# Patient Record
Sex: FEMALE | Race: WHITE | Employment: FULL TIME | URBAN - METROPOLITAN AREA
[De-identification: names, ages, dates, MRNs, and addresses within clinical notes are randomized per-mention and may not be internally consistent; named-entity substitution may affect disease eponyms.]

---

## 2023-10-10 ENCOUNTER — TELEPHONE (OUTPATIENT)
Dept: INTERVENTIONAL RADIOLOGY/VASCULAR | Age: 38
End: 2023-10-10

## 2023-10-12 ENCOUNTER — HOSPITAL ENCOUNTER (OUTPATIENT)
Dept: INTERVENTIONAL RADIOLOGY/VASCULAR | Age: 38
Discharge: HOME | End: 2023-10-12
Payer: MEDICARE

## 2023-10-12 VITALS
DIASTOLIC BLOOD PRESSURE: 66 MMHG | RESPIRATION RATE: 20 BRPM | TEMPERATURE: 97.3 F | OXYGEN SATURATION: 95 % | HEART RATE: 92 BPM | SYSTOLIC BLOOD PRESSURE: 121 MMHG

## 2023-10-12 DIAGNOSIS — R18.8 OTHER ASCITES: ICD-10-CM

## 2023-10-12 LAB
APPEARANCE FLUID: NORMAL
BDY FLUID QUALITY: NORMAL
CELL COUNT FLUID TYPE: NORMAL
COLOR FLUID: YELLOW
INR PPP: 1.79 (ref 0.84–1.16)
LYMPHOCYTES NFR FLD: 44 %
MESOTHL CELL NFR FLD: 3 %
MONOCYTES NFR FLD: 42 %
NEUTROPHIL, FLUID: 11 %
NUC CELL # FLD: 130 /CUMM
PLATELET # BLD AUTO: 184 K/UL (ref 135–450)
PROTHROMBIN TIME: 20.7 SEC (ref 11.5–14.8)
RBC FLUID: <1000 /CUMM
TOTAL CELLS COUNTED FLD: 100

## 2023-10-12 PROCEDURE — 87205 SMEAR GRAM STAIN: CPT

## 2023-10-12 PROCEDURE — 36415 COLL VENOUS BLD VENIPUNCTURE: CPT

## 2023-10-12 PROCEDURE — 85610 PROTHROMBIN TIME: CPT

## 2023-10-12 PROCEDURE — 7100000010 HC PHASE II RECOVERY - FIRST 15 MIN

## 2023-10-12 PROCEDURE — 2500000003 HC RX 250 WO HCPCS: Performed by: INTERNAL MEDICINE

## 2023-10-12 PROCEDURE — 87070 CULTURE OTHR SPECIMN AEROBIC: CPT

## 2023-10-12 PROCEDURE — 89051 BODY FLUID CELL COUNT: CPT

## 2023-10-12 PROCEDURE — 85049 AUTOMATED PLATELET COUNT: CPT

## 2023-10-12 PROCEDURE — 87015 SPECIMEN INFECT AGNT CONCNTJ: CPT

## 2023-10-12 PROCEDURE — 49083 ABD PARACENTESIS W/IMAGING: CPT

## 2023-10-12 PROCEDURE — 7100000011 HC PHASE II RECOVERY - ADDTL 15 MIN

## 2023-10-12 PROCEDURE — C1729 CATH, DRAINAGE: HCPCS

## 2023-10-12 RX ORDER — LIDOCAINE HYDROCHLORIDE 10 MG/ML
5 INJECTION, SOLUTION EPIDURAL; INFILTRATION; INTRACAUDAL; PERINEURAL ONCE
Status: COMPLETED | OUTPATIENT
Start: 2023-10-12 | End: 2023-10-12

## 2023-10-12 RX ADMIN — LIDOCAINE HYDROCHLORIDE 5 ML: 10 INJECTION, SOLUTION EPIDURAL; INFILTRATION; INTRACAUDAL; PERINEURAL at 13:45

## 2023-10-12 NOTE — DISCHARGE INSTRUCTIONS
Paracentesis  10/12/2023  Resume diet, avoid straining, heavy lifting, or strenuous physical activity until next day. No lifting more than 10 pounds or strenuous activity for 24 hours  Keep bandage on site, clean, and dry; may remove tomorrow. If fluid seepage occurs from the puncture site, it is often helpful to apply pressure to the site or lie sideways on the side of the puncture (applies pressure as well). If seepage continues after 30 minutes, contact your prescribing provider, Dr. Neda Chacon  Follow up with ordering physician for when to restart special medications and/or with any problems. Follow up with Dr. Gideon Chacon with any questions, concerns, or results of any testing done. You may receive a phone call from a nurse or tech to check on you in the next couple of days. The interventional radiologist you saw today does not usually follow-up with you after your procedure. He/she does not change or prescribe medications or treatments. All questions after today should be answered by your prescribing physician, Dr. Frosty Litten may resume any vitamin E, fish oil, blood thinners or anticoagulants (Plavix, coumadin, aspirin, Eliquis, etc.) tomorrow unless otherwise instructed. The interventional radiologist you saw today is Dr. Katlyn Michaels, for your records.

## 2023-10-12 NOTE — PROGRESS NOTES
4000ml of fluid removed  Spoke to patients REJI Cifuentes and advised her the amount of fluid removed and that patient was returning to her room.

## 2023-10-12 NOTE — BRIEF OP NOTE
Brief Postoperative Note    Mohinder Steven  YOB: 1985  [id number]    Pre-operative Diagnosis: ascites    Post-operative Diagnosis: Same    Procedure: US-guided paracentesis    Anesthesia: Local    Surgeons: Errol Mclaughlin MD    Estimated Blood Loss: Less than 5 mL    Complications: None    Specimens: ascites drained    Findings: Successful US-guided paracentesis.     Electronically signed by Errol Mclaughlin MD on 10/12/2023 at 1:20 PM

## 2023-10-13 NOTE — PROGRESS NOTES
Call to [facility], at [facility], notified PT/INR 20.7/1.79 on 10/12/23.

## 2023-10-13 NOTE — PROGRESS NOTES
Jax Balderas, from [facility], returned call and stated Dr. Hyacinth Aguilera is aware of PT/INR and pt.is ok for EGD.

## 2023-10-13 NOTE — PROGRESS NOTES
Patient ___ reached   __X___not reached-preop instructions left on voice yyox____[phone]_________      DATE_10/18/23_______ TIME___1435_____ARRIVAL_1300  FEC________      Nothing to eat or drink after midnight the night before,except for what the prep instructions call for. If you do not have the instructions or do not understand them please contact your doctors office. Follow any instructions your doctors office has given you including what medications to take the AM of your procedure and which ones to hold. You may use your inhalers - bring rescue inhalers with you DOS. If you take a long acting insulin the atilio prior please cut the dose in half and take no diabetic medications that AM.Follow specific doctors office instructions regarding blood thinners and if they want you to hold and for how long. If you are on a blood thinner and have no instructions please contact the office and ask. Dress comfortably,bring your insurance card,picture ID,and a complete list of medications, including supplements. You must have a responsible adult to stay with you during the procedure,drive you home and stay with you. [facility] phone number [phone] for any questions. OTHER INSTRUCTIONS(if applicable)_________________________________________________________        VISITOR POLICY(subject to change)    Current visitor policy is 2 visitors per patient. No children allowed. Mask at discretion of facility. Visiting hours are 8a-8p. Overnight visitors will be at the discretion of the nurse. All policies are subject to change.

## 2023-10-15 LAB
BACTERIA FLD AEROBE CULT: NORMAL
GRAM STN SPEC: NORMAL

## 2023-10-18 ENCOUNTER — ANESTHESIA EVENT (OUTPATIENT)
Dept: ENDOSCOPY | Age: 38
End: 2023-10-18
Payer: COMMERCIAL

## 2023-10-18 ENCOUNTER — ANESTHESIA (OUTPATIENT)
Dept: ENDOSCOPY | Age: 38
End: 2023-10-18
Payer: COMMERCIAL

## 2023-10-18 ENCOUNTER — HOSPITAL ENCOUNTER (OUTPATIENT)
Age: 38
Setting detail: OUTPATIENT SURGERY
Discharge: HOME | End: 2023-10-18
Attending: INTERNAL MEDICINE | Admitting: INTERNAL MEDICINE
Payer: COMMERCIAL

## 2023-10-18 LAB — HCG UR QL: NEGATIVE

## 2023-10-18 PROCEDURE — 84703 CHORIONIC GONADOTROPIN ASSAY: CPT

## 2023-10-18 PROCEDURE — 2580000003 HC RX 258: Performed by: INTERNAL MEDICINE

## 2023-10-18 RX ORDER — FUROSEMIDE 20 MG/1
20 TABLET ORAL DAILY
COMMUNITY

## 2023-10-18 RX ORDER — DEXTROAMPHETAMINE SACCHARATE, AMPHETAMINE ASPARTATE MONOHYDRATE, DEXTROAMPHETAMINE SULFATE AND AMPHETAMINE SULFATE 5; 5; 5; 5 MG/1; MG/1; MG/1; MG/1
20 CAPSULE, EXTENDED RELEASE ORAL EVERY MORNING
COMMUNITY

## 2023-10-18 RX ORDER — SPIRONOLACTONE 50 MG/1
50 TABLET, FILM COATED ORAL DAILY
COMMUNITY

## 2023-10-18 RX ORDER — SODIUM CHLORIDE 9 MG/ML
INJECTION, SOLUTION INTRAVENOUS CONTINUOUS
Status: DISCONTINUED | OUTPATIENT
Start: 2023-10-18 | End: 2023-10-19 | Stop reason: HOSPADM

## 2023-10-18 RX ORDER — FOLIC ACID 1 MG/1
1 TABLET ORAL DAILY
COMMUNITY

## 2023-10-18 RX ORDER — DEXTROAMPHETAMINE SACCHARATE, AMPHETAMINE ASPARTATE MONOHYDRATE, DEXTROAMPHETAMINE SULFATE AND AMPHETAMINE SULFATE 3.75; 3.75; 3.75; 3.75 MG/1; MG/1; MG/1; MG/1
15 CAPSULE, EXTENDED RELEASE ORAL EVERY MORNING
COMMUNITY

## 2023-10-18 RX ORDER — TRAMADOL HYDROCHLORIDE 50 MG/1
50 TABLET ORAL EVERY 6 HOURS PRN
COMMUNITY

## 2023-10-18 RX ADMIN — SODIUM CHLORIDE: 9 INJECTION, SOLUTION INTRAVENOUS at 11:14

## 2023-10-18 ASSESSMENT — LIFESTYLE VARIABLES: SMOKING_STATUS: 0

## 2023-10-18 NOTE — ANESTHESIA PRE PROCEDURE
for: \"PHART\", \"PO2ART\", \"JDA2YGX\", \"MEH5GHJ\", \"BEART\", \"C5RSKAFL\"     Type & Screen (If Applicable):  No results found for: \"LABABO\", \"LABRH\"    Drug/Infectious Status (If Applicable):  No results found for: \"HIV\", \"HEPCAB\"    COVID-19 Screening (If Applicable): No results found for: \"COVID19\"        Anesthesia Evaluation  Patient summary reviewed and Nursing notes reviewed no history of anesthetic complications:   Airway: Mallampati: II  TM distance: >3 FB   Neck ROM: full  Mouth opening: > = 3 FB   Dental: normal exam         Pulmonary:Negative Pulmonary ROS and normal exam  breath sounds clear to auscultation      (-) COPD, asthma, sleep apnea and not a current smoker                           Cardiovascular:    (+) hypertension:,     (-) past MI, CAD, CABG/stent, dysrhythmias,  angina and  CHF      Rhythm: regular  Rate: normal                    Neuro/Psych:   (+) psychiatric history (ADHD, EtOH abuse hx):   (-) seizures, TIA and CVA           GI/Hepatic/Renal:   (+) GERD:, liver disease (EtOH cirrhosis): portal hypertension, coagulopathy from hepatic dysfunction, hepatic encephalopathy,      (-) no renal disease       Endo/Other: Negative Endo/Other ROS       (-) diabetes mellitus, hypothyroidism, hyperthyroidism               Abdominal:             Vascular: Other Findings:           Anesthesia Plan      MAC     ASA 3       Induction: intravenous. Anesthetic plan and risks discussed with patient. Plan discussed with CRNA.                     Lesly Haynes MD   10/18/2023

## 2023-10-18 NOTE — PROGRESS NOTES
Teaching / education initiated regarding perioperative experience, expectations, and pain management during stay. Patient verbalized understanding.     Electronically signed by Marco Huerta RN on 10/18/2023 at 11:13 AM

## 2023-10-31 ENCOUNTER — HOSPITAL ENCOUNTER (OUTPATIENT)
Dept: INTERVENTIONAL RADIOLOGY/VASCULAR | Age: 38
Discharge: HOME | End: 2023-10-31
Payer: COMMERCIAL

## 2023-10-31 VITALS
OXYGEN SATURATION: 99 % | RESPIRATION RATE: 15 BRPM | SYSTOLIC BLOOD PRESSURE: 103 MMHG | TEMPERATURE: 97.3 F | DIASTOLIC BLOOD PRESSURE: 54 MMHG | HEART RATE: 88 BPM

## 2023-10-31 DIAGNOSIS — R18.8 OTHER ASCITES: ICD-10-CM

## 2023-10-31 PROCEDURE — 2500000003 HC RX 250 WO HCPCS: Performed by: INTERNAL MEDICINE

## 2023-10-31 PROCEDURE — C1729 CATH, DRAINAGE: HCPCS

## 2023-10-31 PROCEDURE — 7100000010 HC PHASE II RECOVERY - FIRST 15 MIN

## 2023-10-31 PROCEDURE — 49083 ABD PARACENTESIS W/IMAGING: CPT

## 2023-10-31 RX ORDER — LIDOCAINE HYDROCHLORIDE 10 MG/ML
5 INJECTION, SOLUTION EPIDURAL; INFILTRATION; INTRACAUDAL; PERINEURAL ONCE
Status: COMPLETED | OUTPATIENT
Start: 2023-10-31 | End: 2023-10-31

## 2023-10-31 RX ADMIN — LIDOCAINE HYDROCHLORIDE 5 ML: 10 INJECTION, SOLUTION EPIDURAL; INFILTRATION; INTRACAUDAL; PERINEURAL at 14:08

## 2023-10-31 ASSESSMENT — PAIN - FUNCTIONAL ASSESSMENT: PAIN_FUNCTIONAL_ASSESSMENT: 0-10

## 2023-10-31 NOTE — PROGRESS NOTES
Pt received from radiology post paracentesis.  Pt had 5.2L removed pt declines wanting albumin, vss.

## 2023-11-08 NOTE — PROGRESS NOTES
Patient ___ reached   _X____not reached-preop instructions left on voice fjjj______[phone]_______      DATE_11/15/23_______ TIME____1400____ARRIVAL___1230 FEC______      Nothing to eat or drink after midnight the night before,except for what the prep instructions call for. If you do not have the instructions or do not understand them please contact your doctors office. Follow any instructions your doctors office has given you including what medications to take the AM of your procedure and which ones to hold. You may use your inhalers - bring rescue inhalers with you DOS. If you take a long acting insulin the atilio prior please cut the dose in half and take no diabetic medications that AM.Follow specific doctors office instructions regarding blood thinners and if they want you to hold and for how long. If you are on a blood thinner and have no instructions please contact the office and ask. Dress comfortably,bring your insurance card,picture ID,and a complete list of medications, including supplements. You must have a responsible adult to stay with you during the procedure,drive you home and stay with you. [facility] phone number [phone] for any questions. OTHER INSTRUCTIONS(if applicable)_________________________________________________________        VISITOR POLICY(subject to change)    Current visitor policy is 2 visitors per patient. No children allowed. Mask at discretion of facility. Visiting hours are 8a-8p. Overnight visitors will be at the discretion of the nurse. All policies are subject to change.

## 2023-11-14 ENCOUNTER — HOSPITAL ENCOUNTER (OUTPATIENT)
Dept: INTERVENTIONAL RADIOLOGY/VASCULAR | Age: 38
Discharge: HOME | End: 2023-11-14
Payer: COMMERCIAL

## 2023-11-14 VITALS
TEMPERATURE: 97 F | OXYGEN SATURATION: 99 % | DIASTOLIC BLOOD PRESSURE: 49 MMHG | RESPIRATION RATE: 16 BRPM | HEART RATE: 92 BPM | SYSTOLIC BLOOD PRESSURE: 106 MMHG

## 2023-11-14 DIAGNOSIS — R18.8 OTHER ASCITES: ICD-10-CM

## 2023-11-14 LAB
APPEARANCE FLUID: NORMAL
BDY FLUID QUALITY: NORMAL
CELL COUNT FLUID TYPE: NORMAL
COLOR FLUID: YELLOW
INR PPP: 1.98 (ref 0.84–1.16)
LYMPHOCYTES NFR FLD: 11 %
MACROPHAGES # FLD: 75 %
MESOTHL CELL NFR FLD: 8 %
MONONUCLEAR UNIDENTIFIED CELLS FLUID: 1 %
NEUTROPHIL, FLUID: 5 %
NUC CELL # FLD: 263 /CUMM
PATH REV: YES
PLATELET # BLD AUTO: 105 K/UL (ref 135–450)
PROTHROMBIN TIME: 22.4 SEC (ref 11.5–14.8)
RBC FLUID: 4900 /CUMM
TOTAL CELLS COUNTED FLD: 100

## 2023-11-14 PROCEDURE — 7100000011 HC PHASE II RECOVERY - ADDTL 15 MIN

## 2023-11-14 PROCEDURE — 36415 COLL VENOUS BLD VENIPUNCTURE: CPT

## 2023-11-14 PROCEDURE — C1729 CATH, DRAINAGE: HCPCS

## 2023-11-14 PROCEDURE — 88112 CYTOPATH CELL ENHANCE TECH: CPT

## 2023-11-14 PROCEDURE — 49083 ABD PARACENTESIS W/IMAGING: CPT

## 2023-11-14 PROCEDURE — 85610 PROTHROMBIN TIME: CPT

## 2023-11-14 PROCEDURE — 87070 CULTURE OTHR SPECIMN AEROBIC: CPT

## 2023-11-14 PROCEDURE — 2500000003 HC RX 250 WO HCPCS: Performed by: RADIOLOGY

## 2023-11-14 PROCEDURE — 87205 SMEAR GRAM STAIN: CPT

## 2023-11-14 PROCEDURE — 7100000010 HC PHASE II RECOVERY - FIRST 15 MIN

## 2023-11-14 PROCEDURE — 85049 AUTOMATED PLATELET COUNT: CPT

## 2023-11-14 PROCEDURE — 89051 BODY FLUID CELL COUNT: CPT

## 2023-11-14 PROCEDURE — 88305 TISSUE EXAM BY PATHOLOGIST: CPT

## 2023-11-14 RX ORDER — LIDOCAINE HYDROCHLORIDE 10 MG/ML
10 INJECTION, SOLUTION EPIDURAL; INFILTRATION; INTRACAUDAL; PERINEURAL ONCE
Status: COMPLETED | OUTPATIENT
Start: 2023-11-14 | End: 2023-11-14

## 2023-11-14 RX ADMIN — LIDOCAINE HYDROCHLORIDE 10 ML: 10 INJECTION, SOLUTION EPIDURAL; INFILTRATION; INTRACAUDAL; PERINEURAL at 13:33

## 2023-11-14 ASSESSMENT — PAIN - FUNCTIONAL ASSESSMENT: PAIN_FUNCTIONAL_ASSESSMENT: NONE - DENIES PAIN

## 2023-11-14 NOTE — PROGRESS NOTES
4700ml of fluid removed  Spoke to patients REJI schaefer and advised her the amount of fluid removed and that patient was returning to the floor.

## 2023-11-14 NOTE — BRIEF OP NOTE
Brief Postoperative Note    Salena Lee  YOB: 1985  [id number]    Pre-operative Diagnosis: ascites    Post-operative Diagnosis: Same    Procedure: US-guided paracentesis    Anesthesia: Local    Surgeons: Austyn Caro MD    Estimated Blood Loss: Less than 5 mL    Complications: None    Specimens: ascites drained    Findings: Successful US-guided paracentesis.     Electronically signed by Austyn Caro MD on 11/14/2023 at 1:23 PM

## 2023-11-14 NOTE — DISCHARGE INSTRUCTIONS
Resume diet, rest quietly for 24 hours, avoid straining , heavy lifting, or strenuous physical activity until next day. Do not restart aspirin or Vitamin E until next day. Keep bandaid on site clean and dry, may remove tomorrow. Follow up with ordering physician when to restart anticoagulants and/or with any problems. Paracentesis  11/14/2023  Resume diet, avoid straining, heavy lifting, or strenuous physical activity until next day. No lifting more than 10 pounds or strenuous activity for 24 hours  Keep bandage on site, clean, and dry; may remove tomorrow. If fluid seepage occurs from the puncture site, it is often helpful to apply pressure to the site or lie sideways on the side of the puncture (applies pressure as well). If seepage continues after 30 minutes, contact your prescribing provider, Dr. Meenu Rios  .  Follow up with ordering physician for when to restart special medications and/or with any problems. Follow up with Dr. Meenu Rios with any questions, concerns, or results of any testing done. You may receive a phone call from a nurse or tech to check on you in the next couple of days. The interventional radiologist you saw today does not usually follow-up with you after your procedure. He/she does not change or prescribe medications or treatments. All questions after today should be answered by your prescribing physician, Dr. Noreen Olmos may resume any vitamin E, fish oil, blood thinners or anticoagulants (Plavix, coumadin, aspirin, Eliquis, etc.) tomorrow unless otherwise instructed. The interventional radiologist you saw today is Dr. Claros March, for your records. Today you had a paracentesis. We drained 4.7mL off your abdomen.

## 2023-11-15 ENCOUNTER — ANESTHESIA (OUTPATIENT)
Dept: ENDOSCOPY | Age: 38
End: 2023-11-15
Payer: COMMERCIAL

## 2023-11-15 ENCOUNTER — ANESTHESIA EVENT (OUTPATIENT)
Dept: ENDOSCOPY | Age: 38
End: 2023-11-15
Payer: COMMERCIAL

## 2023-11-15 ENCOUNTER — HOSPITAL ENCOUNTER (OUTPATIENT)
Age: 38
Setting detail: OUTPATIENT SURGERY
Discharge: HOME | End: 2023-11-15
Attending: INTERNAL MEDICINE | Admitting: INTERNAL MEDICINE
Payer: COMMERCIAL

## 2023-11-15 VITALS
OXYGEN SATURATION: 98 % | TEMPERATURE: 97.6 F | HEART RATE: 79 BPM | BODY MASS INDEX: 29.72 KG/M2 | SYSTOLIC BLOOD PRESSURE: 102 MMHG | HEIGHT: 66 IN | RESPIRATION RATE: 18 BRPM | DIASTOLIC BLOOD PRESSURE: 65 MMHG | WEIGHT: 184.9 LBS

## 2023-11-15 DIAGNOSIS — K74.60 HEPATIC CIRRHOSIS, UNSPECIFIED HEPATIC CIRRHOSIS TYPE, UNSPECIFIED WHETHER ASCITES PRESENT (HCC): ICD-10-CM

## 2023-11-15 LAB
HCG UR QL: NEGATIVE
PATH CONSULT FLUID: NORMAL

## 2023-11-15 PROCEDURE — 7100000010 HC PHASE II RECOVERY - FIRST 15 MIN: Performed by: INTERNAL MEDICINE

## 2023-11-15 PROCEDURE — 2580000003 HC RX 258: Performed by: INTERNAL MEDICINE

## 2023-11-15 PROCEDURE — 3700000000 HC ANESTHESIA ATTENDED CARE: Performed by: INTERNAL MEDICINE

## 2023-11-15 PROCEDURE — 84703 CHORIONIC GONADOTROPIN ASSAY: CPT

## 2023-11-15 PROCEDURE — 7100000011 HC PHASE II RECOVERY - ADDTL 15 MIN: Performed by: INTERNAL MEDICINE

## 2023-11-15 PROCEDURE — 2709999900 HC NON-CHARGEABLE SUPPLY: Performed by: INTERNAL MEDICINE

## 2023-11-15 PROCEDURE — 3609012400 HC EGD TRANSORAL BIOPSY SINGLE/MULTIPLE: Performed by: INTERNAL MEDICINE

## 2023-11-15 PROCEDURE — 2500000003 HC RX 250 WO HCPCS: Performed by: REGISTERED NURSE

## 2023-11-15 PROCEDURE — 2580000003 HC RX 258: Performed by: REGISTERED NURSE

## 2023-11-15 PROCEDURE — 6360000002 HC RX W HCPCS: Performed by: REGISTERED NURSE

## 2023-11-15 RX ORDER — CARVEDILOL 6.25 MG/1
12.5 TABLET ORAL 2 TIMES DAILY
Qty: 180 TABLET | Refills: 5 | Status: SHIPPED | OUTPATIENT
Start: 2023-11-15

## 2023-11-15 RX ORDER — PROPOFOL 10 MG/ML
INJECTION, EMULSION INTRAVENOUS PRN
Status: DISCONTINUED | OUTPATIENT
Start: 2023-11-15 | End: 2023-11-15 | Stop reason: SDUPTHER

## 2023-11-15 RX ORDER — SODIUM CHLORIDE 9 MG/ML
INJECTION, SOLUTION INTRAVENOUS CONTINUOUS
Status: DISCONTINUED | OUTPATIENT
Start: 2023-11-15 | End: 2023-11-15 | Stop reason: HOSPADM

## 2023-11-15 RX ORDER — SODIUM CHLORIDE 9 MG/ML
INJECTION, SOLUTION INTRAVENOUS CONTINUOUS PRN
Status: DISCONTINUED | OUTPATIENT
Start: 2023-11-15 | End: 2023-11-15 | Stop reason: SDUPTHER

## 2023-11-15 RX ORDER — LIDOCAINE HYDROCHLORIDE 20 MG/ML
INJECTION, SOLUTION EPIDURAL; INFILTRATION; INTRACAUDAL; PERINEURAL PRN
Status: DISCONTINUED | OUTPATIENT
Start: 2023-11-15 | End: 2023-11-15 | Stop reason: SDUPTHER

## 2023-11-15 RX ORDER — OMEPRAZOLE 20 MG/1
20 CAPSULE, DELAYED RELEASE ORAL
Qty: 90 CAPSULE | Refills: 1 | Status: SHIPPED | OUTPATIENT
Start: 2023-11-15

## 2023-11-15 RX ADMIN — PROPOFOL 120 MCG/KG/MIN: 10 INJECTION, EMULSION INTRAVENOUS at 13:59

## 2023-11-15 RX ADMIN — PROPOFOL 100 MG: 10 INJECTION, EMULSION INTRAVENOUS at 14:00

## 2023-11-15 RX ADMIN — SODIUM CHLORIDE: 9 INJECTION, SOLUTION INTRAVENOUS at 13:56

## 2023-11-15 RX ADMIN — LIDOCAINE HYDROCHLORIDE 100 MG: 20 INJECTION, SOLUTION EPIDURAL; INFILTRATION; INTRACAUDAL; PERINEURAL at 14:00

## 2023-11-15 RX ADMIN — SODIUM CHLORIDE: 9 INJECTION, SOLUTION INTRAVENOUS at 13:06

## 2023-11-15 ASSESSMENT — PAIN DESCRIPTION - FREQUENCY: FREQUENCY: INTERMITTENT

## 2023-11-15 ASSESSMENT — LIFESTYLE VARIABLES: SMOKING_STATUS: 0

## 2023-11-15 ASSESSMENT — PAIN SCALES - GENERAL: PAINLEVEL_OUTOF10: 2

## 2023-11-15 ASSESSMENT — PAIN DESCRIPTION - LOCATION: LOCATION: ABDOMEN

## 2023-11-15 NOTE — DISCHARGE INSTRUCTIONS
EGD DISCHARGE INSTRUCTIONS    Use salt water gargle, lozenges, or Chloraseptic spray as needed for sore throat. If you have fever, chills, excessive bleeding, severe chest pain, or abdominal pain, or any other problems, contact your physician's office immediately at [phone]. If you had an esophageal dilatation, and experience fever, chills, excessive bleeding, shortness of breath, chest or abdominal pain, or any other unusual symptom, call the office immediately at [phone]. Continue home medications as directed. Call physician's office in five to seven business days for biopsy results or further instructions. Call your physician at [phone] for a follow up appointment in ______________    You need another EGD in _______________________________    See your physician's report for procedure details and recommendations. ANESTHESIA DISCHARGE INSTRUCTIONS    Wear your seatbelt home. A responsible adult needs to be with you for 24 hours  You are under the influence of drugs-do not drink alcohol, drive ,operate machinery,or make any important decisions or sign any legal documentsfor 24 hours. You may resume normal activities tomorrow. You may experience lightheadedness,dizziness,or sleepiness following surgery. Rest at home today - increase activity as tolerated. It is recommended to take a four hour nap after procedure. Progress slowly to a regular diet unless your physician has instructed you otherwise. Avoid spicy and greasy food on first meal.  Drink plenty of water. If nausea becomes a problem call your physician. Call your doctor if concerns arise.

## 2023-11-15 NOTE — PROGRESS NOTES
Teaching / education initiated regarding perioperative experience, expectations, and pain management during stay. Patient verbalized understanding. Coag profile from 11/14/2023 reported to Dr Dena Diaz. OK to proceed with procedure.

## 2023-11-15 NOTE — ANESTHESIA POSTPROCEDURE EVALUATION
Department of Anesthesiology  Postprocedure Note    Patient: Mary Oakes  MRN: [id number]  YOB: 1985  Date of evaluation: 11/15/2023      Procedure Summary       Date: 11/15/23 Room / Location: [address] [place] [address]    Anesthesia Start: 8056 Anesthesia Stop: 1628    Procedure: EGD BIOPSY (Abdomen) Diagnosis:       Hepatic cirrhosis, unspecified hepatic cirrhosis type, unspecified whether ascites present ([address])      (Hepatic cirrhosis, unspecified hepatic cirrhosis type, unspecified whether ascites present ([address]) [K74.60])    Surgeons: Enrique Michel MD Responsible Provider: Mark Kurtz MD    Anesthesia Type: MAC ASA Status: 3            Anesthesia Type: No value filed.     Muna Phase I: Muna Score: 10    Muna Phase II: Muna Score: 10      Anesthesia Post Evaluation    Patient location during evaluation: bedside  Patient participation: complete - patient participated  Level of consciousness: awake and alert  Pain score: 2  Airway patency: patent  Nausea & Vomiting: no vomiting  Complications: no  Cardiovascular status: hemodynamically stable  Respiratory status: nonlabored ventilation  Hydration status: stable  Multimodal analgesia pain management approach  Pain management: adequate

## 2023-11-15 NOTE — BRIEF OP NOTE
Brief Postoperative Note      Patient: Luci Sinclair  YOB: 1985  MRN: [id number]    Date of Procedure: 11/15/2023    Pre-Op Diagnosis Codes:     * Hepatic cirrhosis, unspecified hepatic cirrhosis type, unspecified whether ascites present ([address]) [K74.60]       Procedure(s):  EGD BIOPSY    Surgeon(s):  Negrito Chino MD    Anesthesia: Monitor Anesthesia Care    Estimated Blood Loss (mL): Minimal    Complications: None    Specimens:   ID Type Source Tests Collected by Time Destination   A : Gastric Bx r/o H.pylori Tissue Stomach SURGICAL PATHOLOGY Lasha Morales RN 11/15/2023 1403        Findings:   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 Not amenable to esophageal banding. Mild portal hypertensive gastropathy. Patchy antral gastritis, biopsied. Plans:  Omeprazole 20 mg daily. Carvedilol 12.5 mg BID. Recall EGD in 1 year for EV surveillance.         Electronically signed by Negrito Chino MD on 11/15/2023 at 2:11 PM

## 2023-11-17 LAB
BACTERIA FLD AEROBE CULT: NORMAL
GRAM STN SPEC: NORMAL

## 2023-12-05 ENCOUNTER — HOSPITAL ENCOUNTER (OUTPATIENT)
Dept: INTERVENTIONAL RADIOLOGY/VASCULAR | Age: 38
Discharge: HOME | End: 2023-12-05
Payer: COMMERCIAL

## 2023-12-05 VITALS
TEMPERATURE: 98 F | DIASTOLIC BLOOD PRESSURE: 51 MMHG | RESPIRATION RATE: 16 BRPM | OXYGEN SATURATION: 100 % | SYSTOLIC BLOOD PRESSURE: 96 MMHG | HEART RATE: 72 BPM

## 2023-12-05 DIAGNOSIS — R18.8 OTHER ASCITES: ICD-10-CM

## 2023-12-05 PROCEDURE — P9047 ALBUMIN (HUMAN), 25%, 50ML: HCPCS

## 2023-12-05 PROCEDURE — C1729 CATH, DRAINAGE: HCPCS

## 2023-12-05 PROCEDURE — 49083 ABD PARACENTESIS W/IMAGING: CPT

## 2023-12-05 PROCEDURE — 2500000003 HC RX 250 WO HCPCS: Performed by: INTERNAL MEDICINE

## 2023-12-05 PROCEDURE — 7100000011 HC PHASE II RECOVERY - ADDTL 15 MIN

## 2023-12-05 PROCEDURE — 7100000010 HC PHASE II RECOVERY - FIRST 15 MIN

## 2023-12-05 PROCEDURE — 6360000002 HC RX W HCPCS

## 2023-12-05 RX ORDER — LIDOCAINE HYDROCHLORIDE 10 MG/ML
5 INJECTION, SOLUTION EPIDURAL; INFILTRATION; INTRACAUDAL; PERINEURAL ONCE
Status: COMPLETED | OUTPATIENT
Start: 2023-12-05 | End: 2023-12-05

## 2023-12-05 RX ORDER — ALBUMIN (HUMAN) 12.5 G/50ML
37.5 SOLUTION INTRAVENOUS ONCE
Status: COMPLETED | OUTPATIENT
Start: 2023-12-05 | End: 2023-12-05

## 2023-12-05 RX ORDER — ALBUMIN (HUMAN) 12.5 G/50ML
SOLUTION INTRAVENOUS
Status: COMPLETED
Start: 2023-12-05 | End: 2023-12-05

## 2023-12-05 RX ADMIN — ALBUMIN (HUMAN) 37.5 G: 0.25 INJECTION, SOLUTION INTRAVENOUS at 13:29

## 2023-12-05 RX ADMIN — ALBUMIN (HUMAN) 37.5 G: 12.5 SOLUTION INTRAVENOUS at 13:29

## 2023-12-05 RX ADMIN — LIDOCAINE HYDROCHLORIDE 5 ML: 10 INJECTION, SOLUTION EPIDURAL; INFILTRATION; INTRACAUDAL; PERINEURAL at 12:59

## 2023-12-05 NOTE — DISCHARGE INSTRUCTIONS
Paracentesis  12/5/2023  Resume diet, avoid straining, heavy lifting, or strenuous physical activity until next day. No lifting more than 10 pounds or strenuous activity for 24 hours  Keep bandage on site, clean, and dry; may remove tomorrow. If fluid seepage occurs from the puncture site, it is often helpful to apply pressure to the site or lie sideways on the side of the puncture (applies pressure as well). If seepage continues after 30 minutes, contact your prescribing provider. .  Follow up with ordering physician for when to restart special medications and/or with any problems. Follow up with with any questions, concerns, or results of any testing done. You may receive a phone call from a nurse or tech to check on you in the next couple of days. The interventional radiologist you saw today does not usually follow-up with you after your procedure. He/she does not change or prescribe medications or treatments. All questions after today should be answered by your prescribing physician,  You may resume any vitamin E, fish oil, blood thinners or anticoagulants (Plavix, coumadin, aspirin, Eliquis, etc.) tomorrow unless otherwise instructed. The interventional radiologist you saw today is Dr. Hilaria Buerger, for your records. Today you had a paracentesis. We drained 6mL off your abdomen. Then we gave you 37.5g albumin to prevent a fluid shift in your body.

## 2023-12-05 NOTE — PROGRESS NOTES
Pt returned to unit from IR 6L removed new orders. Pt denies any pain VSS site to R ABD site noted scant drainage to dressing.  Call light inreach

## 2023-12-05 NOTE — BRIEF OP NOTE
Brief Postoperative Note    Deann Brito  YOB: 1985  [id number]    Pre-operative Diagnosis: ascites    Post-operative Diagnosis: Same    Procedure: US-guided paracentesis    Anesthesia: Local    Surgeons: Jaclyn Suarez MD    Estimated Blood Loss: Less than 5 mL    Complications: None    Specimens: ascites drained    Findings: Successful US-guided paracentesis.     Electronically signed by Jaclyn Suarez MD on 12/5/2023 at 12:52 PM

## 2024-01-05 ENCOUNTER — HOSPITAL ENCOUNTER (OUTPATIENT)
Dept: INTERVENTIONAL RADIOLOGY/VASCULAR | Age: 39
Discharge: HOME | End: 2024-01-05
Payer: COMMERCIAL

## 2024-01-05 VITALS
RESPIRATION RATE: 16 BRPM | DIASTOLIC BLOOD PRESSURE: 56 MMHG | OXYGEN SATURATION: 95 % | TEMPERATURE: 97 F | HEART RATE: 85 BPM | SYSTOLIC BLOOD PRESSURE: 96 MMHG

## 2024-01-05 DIAGNOSIS — R18.8 OTHER ASCITES: ICD-10-CM

## 2024-01-05 LAB
APPEARANCE FLUID: CLEAR
BASOPHILS NFR FLD MANUAL: 0 %
BDY FLUID QUALITY: NORMAL
CELL COUNT FLUID TYPE: NORMAL
COLOR FLUID: YELLOW
LYMPHOCYTES NFR FLD: 29 %
MACROPHAGES # FLD: 38 %
MESOTHL CELL NFR FLD: 21 %
MONOCYTES NFR FLD: 1 %
MONONUCLEAR UNIDENTIFIED CELLS FLUID: 2 %
NEUTROPHIL, FLUID: 9 %
NUC CELL # FLD: 189 /CUMM
PATH REV: YES
RBC FLUID: 1100 /CUMM
TOTAL CELLS COUNTED FLD: 100

## 2024-01-05 PROCEDURE — 87205 SMEAR GRAM STAIN: CPT

## 2024-01-05 PROCEDURE — 87070 CULTURE OTHR SPECIMN AEROBIC: CPT

## 2024-01-05 PROCEDURE — P9047 ALBUMIN (HUMAN), 25%, 50ML: HCPCS

## 2024-01-05 PROCEDURE — C1729 CATH, DRAINAGE: HCPCS

## 2024-01-05 PROCEDURE — 87015 SPECIMEN INFECT AGNT CONCNTJ: CPT

## 2024-01-05 PROCEDURE — 89051 BODY FLUID CELL COUNT: CPT

## 2024-01-05 PROCEDURE — 6360000002 HC RX W HCPCS

## 2024-01-05 PROCEDURE — 0W9G3ZX DRAINAGE OF PERITONEAL CAVITY, PERCUTANEOUS APPROACH, DIAGNOSTIC: ICD-10-PCS | Performed by: RADIOLOGY

## 2024-01-05 PROCEDURE — 2500000003 HC RX 250 WO HCPCS: Performed by: RADIOLOGY

## 2024-01-05 PROCEDURE — 49083 ABD PARACENTESIS W/IMAGING: CPT

## 2024-01-05 RX ORDER — LIDOCAINE HYDROCHLORIDE 10 MG/ML
5 INJECTION, SOLUTION EPIDURAL; INFILTRATION; INTRACAUDAL; PERINEURAL ONCE
Status: COMPLETED | OUTPATIENT
Start: 2024-01-05 | End: 2024-01-05

## 2024-01-05 RX ORDER — ALBUMIN (HUMAN) 12.5 G/50ML
SOLUTION INTRAVENOUS
Status: COMPLETED
Start: 2024-01-05 | End: 2024-01-05

## 2024-01-05 RX ORDER — ALBUMIN (HUMAN) 12.5 G/50ML
50 SOLUTION INTRAVENOUS ONCE
Status: COMPLETED | OUTPATIENT
Start: 2024-01-05 | End: 2024-01-05

## 2024-01-05 RX ADMIN — LIDOCAINE HYDROCHLORIDE 5 ML: 10 INJECTION, SOLUTION EPIDURAL; INFILTRATION; INTRACAUDAL; PERINEURAL at 13:22

## 2024-01-05 RX ADMIN — ALBUMIN (HUMAN) 50 G: 12.5 SOLUTION INTRAVENOUS at 13:34

## 2024-01-05 RX ADMIN — ALBUMIN HUMAN 50 G: 0.25 SOLUTION INTRAVENOUS at 13:34

## 2024-01-05 ASSESSMENT — PAIN - FUNCTIONAL ASSESSMENT
PAIN_FUNCTIONAL_ASSESSMENT: NONE - DENIES PAIN
PAIN_FUNCTIONAL_ASSESSMENT: NONE - DENIES PAIN

## 2024-01-05 NOTE — PROGRESS NOTES
7500ml of fluid removed  Spoke to patients REJI Cifuentes and advised her the amount of fluid removed and that patient was returning to her room.

## 2024-01-05 NOTE — DISCHARGE INSTRUCTIONS
Paracentesis  1/5/2024  Resume diet, avoid straining, heavy lifting, or strenuous physical activity until next day.   No lifting more than 10 pounds or strenuous activity for 24 hours  Keep bandage on site, clean, and dry; may remove tomorrow.    If fluid seepage occurs from the puncture site, it is often helpful to apply pressure to the site or lie sideways on the side of the puncture (applies pressure as well).  If seepage continues after 30 minutes, contact your prescribing provider.  Follow up with ordering physician for when to restart special medications and/or with any problems.     Follow up with your physician with any questions, concerns, or results of any testing done.    You may receive a phone call from a nurse or tech to check on you in the next couple of days.  The interventional radiologist you saw today does not usually follow-up with you after your procedure.  He/she does not change or prescribe medications or treatments.  All questions after today should be answered by your prescribing physician.    You may resume any vitamin E, fish oil, blood thinners or anticoagulants (Plavix, coumadin, aspirin, Eliquis, etc.) tomorrow unless otherwise instructed.    The interventional radiologist you saw today is Dr. Biswas, for your records.      Today you had a paracentesis.  We drained ***mL off your abdomen.  Then we gave you ***g albumin to prevent a fluid shift in your body.

## 2024-01-05 NOTE — FLOWSHEET NOTE
01/05/24 1440   AVS Reviewed   AVS & discharge instructions reviewed with patient and/or representative? Yes   Reviewed instructions with Patient   Level of Understanding Questions answered;Teach back completed;Verbalized understanding;Return demonstration     Patient discharged home with all belongings

## 2024-01-05 NOTE — PROGRESS NOTES
Pt on unit from IR report received at bedside. Pt denies any pain VSS site to R ABD CDI call light in reach. New order for albumin given. IR removed 7.5ml

## 2024-01-05 NOTE — BRIEF OP NOTE
Brief Postoperative Note    Angélica Gonzalez  YOB: 1985  [id number]    Pre-operative Diagnosis: ascites    Post-operative Diagnosis: Same    Procedure: US-guided paracentesis    Anesthesia: Local    Surgeons: Kermit Espino MD    Estimated Blood Loss: Less than 5 mL    Complications: None    Specimens: ascites drained    Findings: Successful US-guided paracentesis.    Electronically signed by Kermit Espino MD on 1/5/2024 at 1:08 PM

## 2024-01-06 ENCOUNTER — HOSPITAL ENCOUNTER (INPATIENT)
Age: 39
LOS: 7 days | Discharge: HOME | DRG: 433 | End: 2024-01-13
Attending: FAMILY MEDICINE | Admitting: FAMILY MEDICINE
Payer: COMMERCIAL

## 2024-01-06 DIAGNOSIS — E87.1 HYPONATREMIA: Primary | ICD-10-CM

## 2024-01-06 DIAGNOSIS — R17 CHOLESTATIC JAUNDICE: ICD-10-CM

## 2024-01-06 PROBLEM — R42 DIZZINESS: Status: ACTIVE | Noted: 2024-01-06

## 2024-01-06 LAB
ABO + RH BLD: NORMAL
ALBUMIN SERPL-MCNC: 2.9 G/DL (ref 3.4–5)
ALP SERPL-CCNC: 173 U/L (ref 40–129)
ALT SERPL-CCNC: 23 U/L (ref 10–40)
ANION GAP SERPL CALCULATED.3IONS-SCNC: 10 MMOL/L (ref 3–16)
AST SERPL-CCNC: 60 U/L (ref 15–37)
BASOPHILS # BLD: 0 K/UL (ref 0–0.2)
BASOPHILS NFR BLD: 0.3 %
BILIRUB DIRECT SERPL-MCNC: 6.5 MG/DL (ref 0–0.3)
BILIRUB INDIRECT SERPL-MCNC: 7.7 MG/DL (ref 0–1)
BILIRUB SERPL-MCNC: 14.2 MG/DL (ref 0–1)
BLD GP AB SCN SERPL QL: NORMAL
BLOOD BANK DISPENSE STATUS: NORMAL
BLOOD BANK PRODUCT CODE: NORMAL
BPU ID: NORMAL
BUN SERPL-MCNC: 12 MG/DL (ref 7–20)
CALCIUM SERPL-MCNC: 8.3 MG/DL (ref 8.3–10.6)
CHLORIDE SERPL-SCNC: 91 MMOL/L (ref 99–110)
CO2 SERPL-SCNC: 22 MMOL/L (ref 21–32)
CREAT SERPL-MCNC: <0.5 MG/DL (ref 0.6–1.1)
DEPRECATED RDW RBC AUTO: 15.4 % (ref 12.4–15.4)
DESCRIPTION BLOOD BANK: NORMAL
EOSINOPHIL # BLD: 0.2 K/UL (ref 0–0.6)
EOSINOPHIL NFR BLD: 2.9 %
GFR SERPLBLD CREATININE-BSD FMLA CKD-EPI: >60 ML/MIN/{1.73_M2}
GLUCOSE SERPL-MCNC: 107 MG/DL (ref 70–99)
HCT VFR BLD AUTO: 18.4 % (ref 36–48)
HCT VFR BLD AUTO: 24.3 % (ref 36–48)
HGB BLD-MCNC: 6.8 G/DL (ref 12–16)
HGB BLD-MCNC: 8.5 G/DL (ref 12–16)
INR PPP: 2.75 (ref 0.84–1.16)
LYMPHOCYTES # BLD: 1 K/UL (ref 1–5.1)
LYMPHOCYTES NFR BLD: 12.1 %
MCH RBC QN AUTO: 40.6 PG (ref 26–34)
MCHC RBC AUTO-ENTMCNC: 37 G/DL (ref 31–36)
MCV RBC AUTO: 109.6 FL (ref 80–100)
MONOCYTES # BLD: 0.9 K/UL (ref 0–1.3)
MONOCYTES NFR BLD: 10.7 %
NEUTROPHILS # BLD: 6.1 K/UL (ref 1.7–7.7)
NEUTROPHILS NFR BLD: 74 %
OSMOLALITY SERPL: 262 MOSM/KG (ref 275–295)
OSMOLALITY UR: 678 MOSM/KG (ref 390–1070)
PLATELET # BLD AUTO: 75 K/UL (ref 135–450)
PMV BLD AUTO: 6.4 FL (ref 5–10.5)
POTASSIUM SERPL-SCNC: 4.9 MMOL/L (ref 3.5–5.1)
POTASSIUM UR-SCNC: 72.6 MMOL/L
PROT SERPL-MCNC: 5.2 G/DL (ref 6.4–8.2)
PROTHROMBIN TIME: 28.9 SEC (ref 11.5–14.8)
RBC # BLD AUTO: 1.68 M/UL (ref 4–5.2)
SODIUM SERPL-SCNC: 119 MMOL/L (ref 136–145)
SODIUM SERPL-SCNC: 122 MMOL/L (ref 136–145)
SODIUM SERPL-SCNC: 123 MMOL/L (ref 136–145)
SODIUM UR-SCNC: 42 MMOL/L
TSH SERPL DL<=0.005 MIU/L-ACNC: 2.64 UIU/ML (ref 0.27–4.2)
URATE SERPL-MCNC: 3.6 MG/DL (ref 2.6–6)
WBC # BLD AUTO: 8.3 K/UL (ref 4–11)

## 2024-01-06 PROCEDURE — P9016 RBC LEUKOCYTES REDUCED: HCPCS

## 2024-01-06 PROCEDURE — 36430 TRANSFUSION BLD/BLD COMPNT: CPT

## 2024-01-06 PROCEDURE — 84295 ASSAY OF SERUM SODIUM: CPT

## 2024-01-06 PROCEDURE — 85610 PROTHROMBIN TIME: CPT

## 2024-01-06 PROCEDURE — 1200000000 HC SEMI PRIVATE

## 2024-01-06 PROCEDURE — 85018 HEMOGLOBIN: CPT

## 2024-01-06 PROCEDURE — 80076 HEPATIC FUNCTION PANEL: CPT

## 2024-01-06 PROCEDURE — 86901 BLOOD TYPING SEROLOGIC RH(D): CPT

## 2024-01-06 PROCEDURE — 84550 ASSAY OF BLOOD/URIC ACID: CPT

## 2024-01-06 PROCEDURE — 84133 ASSAY OF URINE POTASSIUM: CPT

## 2024-01-06 PROCEDURE — 83935 ASSAY OF URINE OSMOLALITY: CPT

## 2024-01-06 PROCEDURE — 85014 HEMATOCRIT: CPT

## 2024-01-06 PROCEDURE — 84300 ASSAY OF URINE SODIUM: CPT

## 2024-01-06 PROCEDURE — 85025 COMPLETE CBC W/AUTO DIFF WBC: CPT

## 2024-01-06 PROCEDURE — 84443 ASSAY THYROID STIM HORMONE: CPT

## 2024-01-06 PROCEDURE — 6370000000 HC RX 637 (ALT 250 FOR IP): Performed by: NURSE PRACTITIONER

## 2024-01-06 PROCEDURE — 6370000000 HC RX 637 (ALT 250 FOR IP): Performed by: INTERNAL MEDICINE

## 2024-01-06 PROCEDURE — 83930 ASSAY OF BLOOD OSMOLALITY: CPT

## 2024-01-06 PROCEDURE — 36415 COLL VENOUS BLD VENIPUNCTURE: CPT

## 2024-01-06 PROCEDURE — 86900 BLOOD TYPING SEROLOGIC ABO: CPT

## 2024-01-06 PROCEDURE — 6360000002 HC RX W HCPCS: Performed by: NURSE PRACTITIONER

## 2024-01-06 PROCEDURE — P9047 ALBUMIN (HUMAN), 25%, 50ML: HCPCS | Performed by: NURSE PRACTITIONER

## 2024-01-06 PROCEDURE — 86923 COMPATIBILITY TEST ELECTRIC: CPT

## 2024-01-06 PROCEDURE — 30233N1 TRANSFUSION OF NONAUTOLOGOUS RED BLOOD CELLS INTO PERIPHERAL VEIN, PERCUTANEOUS APPROACH: ICD-10-PCS | Performed by: RADIOLOGY

## 2024-01-06 PROCEDURE — 80048 BASIC METABOLIC PNL TOTAL CA: CPT

## 2024-01-06 PROCEDURE — 2580000003 HC RX 258: Performed by: NURSE PRACTITIONER

## 2024-01-06 PROCEDURE — 86850 RBC ANTIBODY SCREEN: CPT

## 2024-01-06 RX ORDER — ONDANSETRON 2 MG/ML
4 INJECTION INTRAMUSCULAR; INTRAVENOUS EVERY 6 HOURS PRN
Status: DISCONTINUED | OUTPATIENT
Start: 2024-01-06 | End: 2024-01-13 | Stop reason: HOSPADM

## 2024-01-06 RX ORDER — ACETAMINOPHEN 650 MG/1
650 SUPPOSITORY RECTAL EVERY 6 HOURS PRN
Status: DISCONTINUED | OUTPATIENT
Start: 2024-01-06 | End: 2024-01-13 | Stop reason: HOSPADM

## 2024-01-06 RX ORDER — PANTOPRAZOLE SODIUM 40 MG/1
40 TABLET, DELAYED RELEASE ORAL
Status: DISCONTINUED | OUTPATIENT
Start: 2024-01-06 | End: 2024-01-13 | Stop reason: HOSPADM

## 2024-01-06 RX ORDER — ACETAMINOPHEN 325 MG/1
650 TABLET ORAL EVERY 6 HOURS PRN
Status: DISCONTINUED | OUTPATIENT
Start: 2024-01-06 | End: 2024-01-13 | Stop reason: HOSPADM

## 2024-01-06 RX ORDER — POTASSIUM CHLORIDE 7.45 MG/ML
10 INJECTION INTRAVENOUS PRN
Status: DISCONTINUED | OUTPATIENT
Start: 2024-01-06 | End: 2024-01-13 | Stop reason: HOSPADM

## 2024-01-06 RX ORDER — POLYETHYLENE GLYCOL 3350 17 G/17G
17 POWDER, FOR SOLUTION ORAL DAILY PRN
Status: DISCONTINUED | OUTPATIENT
Start: 2024-01-06 | End: 2024-01-13 | Stop reason: HOSPADM

## 2024-01-06 RX ORDER — DEXTROAMPHETAMINE SACCHARATE, AMPHETAMINE ASPARTATE MONOHYDRATE, DEXTROAMPHETAMINE SULFATE AND AMPHETAMINE SULFATE 2.5; 2.5; 2.5; 2.5 MG/1; MG/1; MG/1; MG/1
20 CAPSULE, EXTENDED RELEASE ORAL EVERY MORNING
Status: DISCONTINUED | OUTPATIENT
Start: 2024-01-06 | End: 2024-01-13 | Stop reason: HOSPADM

## 2024-01-06 RX ORDER — FOLIC ACID 1 MG/1
1 TABLET ORAL DAILY
Status: DISCONTINUED | OUTPATIENT
Start: 2024-01-06 | End: 2024-01-13 | Stop reason: HOSPADM

## 2024-01-06 RX ORDER — MAGNESIUM SULFATE IN WATER 40 MG/ML
2000 INJECTION, SOLUTION INTRAVENOUS PRN
Status: DISCONTINUED | OUTPATIENT
Start: 2024-01-06 | End: 2024-01-13 | Stop reason: HOSPADM

## 2024-01-06 RX ORDER — ONDANSETRON 4 MG/1
4 TABLET, ORALLY DISINTEGRATING ORAL EVERY 8 HOURS PRN
Status: DISCONTINUED | OUTPATIENT
Start: 2024-01-06 | End: 2024-01-13 | Stop reason: HOSPADM

## 2024-01-06 RX ORDER — SODIUM CHLORIDE 9 MG/ML
INJECTION, SOLUTION INTRAVENOUS PRN
Status: DISCONTINUED | OUTPATIENT
Start: 2024-01-06 | End: 2024-01-13 | Stop reason: HOSPADM

## 2024-01-06 RX ORDER — SODIUM CHLORIDE 9 MG/ML
INJECTION, SOLUTION INTRAVENOUS PRN
Status: DISCONTINUED | OUTPATIENT
Start: 2024-01-06 | End: 2024-01-11

## 2024-01-06 RX ORDER — TRAMADOL HYDROCHLORIDE 50 MG/1
50 TABLET ORAL EVERY 6 HOURS PRN
Status: DISCONTINUED | OUTPATIENT
Start: 2024-01-06 | End: 2024-01-13 | Stop reason: HOSPADM

## 2024-01-06 RX ORDER — ALBUMIN (HUMAN) 12.5 G/50ML
50 SOLUTION INTRAVENOUS ONCE
Status: COMPLETED | OUTPATIENT
Start: 2024-01-06 | End: 2024-01-06

## 2024-01-06 RX ORDER — SODIUM CHLORIDE 0.9 % (FLUSH) 0.9 %
5-40 SYRINGE (ML) INJECTION PRN
Status: DISCONTINUED | OUTPATIENT
Start: 2024-01-06 | End: 2024-01-13 | Stop reason: HOSPADM

## 2024-01-06 RX ORDER — DEXTROAMPHETAMINE SACCHARATE, AMPHETAMINE ASPARTATE, DEXTROAMPHETAMINE SULFATE AND AMPHETAMINE SULFATE 3.75; 3.75; 3.75; 3.75 MG/1; MG/1; MG/1; MG/1
15 TABLET ORAL DAILY
Status: ON HOLD | COMMUNITY
Start: 2023-12-11

## 2024-01-06 RX ORDER — SODIUM CHLORIDE 0.9 % (FLUSH) 0.9 %
5-40 SYRINGE (ML) INJECTION EVERY 12 HOURS SCHEDULED
Status: DISCONTINUED | OUTPATIENT
Start: 2024-01-06 | End: 2024-01-13 | Stop reason: HOSPADM

## 2024-01-06 RX ORDER — POTASSIUM CHLORIDE 20 MEQ/1
40 TABLET, EXTENDED RELEASE ORAL PRN
Status: DISCONTINUED | OUTPATIENT
Start: 2024-01-06 | End: 2024-01-13 | Stop reason: HOSPADM

## 2024-01-06 RX ADMIN — Medication 10 ML: at 19:53

## 2024-01-06 RX ADMIN — FOLIC ACID 1 MG: 1 TABLET ORAL at 11:45

## 2024-01-06 RX ADMIN — PANTOPRAZOLE SODIUM 40 MG: 40 TABLET, DELAYED RELEASE ORAL at 05:10

## 2024-01-06 RX ADMIN — ALBUMIN (HUMAN) 50 G: 0.25 INJECTION, SOLUTION INTRAVENOUS at 05:22

## 2024-01-06 RX ADMIN — Medication 10 ML: at 11:45

## 2024-01-06 RX ADMIN — Medication 15 G: at 23:40

## 2024-01-06 ASSESSMENT — PAIN SCALES - GENERAL
PAINLEVEL_OUTOF10: 0
PAINLEVEL_OUTOF10: 0

## 2024-01-06 NOTE — CONSENT
Informed Consent for Blood Component Transfusion Note    I have discussed with the patient the rationale for blood component transfusion; its benefits in treating or preventing fatigue, organ damage, or death; and its risk which includes mild transfusion reactions, rare risk of blood borne infection, or more serious but rare reactions. I have discussed the alternatives to transfusion, including the risk and consequences of not receiving transfusion. The patient had an opportunity to ask questions and had agreed to proceed with transfusion of blood components.    Electronically signed by MOO Branch NP on 1/6/24 at 5:12 AM EST

## 2024-01-06 NOTE — BLOOD REFUSAL
Gastroenterology Consult Note                          Patient: Angélica Gonzalez  : 1985  CSN#:      Date:  2024    Subjective:       History of Present Illness  Patient is a 38 y.o. female admitted with Dizziness [R42]  Hyponatremia [E87.1] who is seen in consult for cirrhosis.  She was recently diagnosed with alcoholic related cirrhosis.  Recently seen by Dr. Burns.  He did an EGD that showed grade 1 and grade 2 varices.  He started her on carvedilol.  She has a history of drinking 6 shots of liquor a day.  She stopped in September and has not had any alcohol since.  Her cirrhosis is further decompensated by the presence of large volume ascites.  She has had multiple paracenteses.  Her most recent was yesterday on 2024.  She had 7.5 L of ascites removed.  Fluid studies are negative for SBP.  She also been on furosemide and spironolactone.  She felt fine initially after her paracentesis yesterday and then felt weak and lightheaded.  So she was sent to the ER.  There she was found to be hyponatremic sodium 120.  Her bilirubin is also elevated more than her most recent baseline.  Last baseline was bilirubin of 7.  She denies any fevers GI bleeding or other complaints.      Past Medical History:   Diagnosis Date    ADHD     Cirrhosis (HCC)     ETOH abuse     Hepatic cirrhosis (HCC)       Past Surgical History:   Procedure Laterality Date    PARACENTESIS  10/14/2023    UPPER GASTROINTESTINAL ENDOSCOPY N/A 11/15/2023    EGD BIOPSY performed by Jone Burns MD at [facility]       Admission Meds  No current facility-administered medications on file prior to encounter.     Current Outpatient Medications on File Prior to Encounter   Medication Sig Dispense Refill    amphetamine-dextroamphetamine (ADDERALL) 15 MG tablet Take 1 tablet by mouth daily.  at 5pm      levonorgestrel (MIRENA) IUD 52 mg 1 each by IntraUTERine route once      omeprazole (PRILOSEC) 20 MG delayed release capsule

## 2024-01-06 NOTE — PROGRESS NOTES
4 Eyes Skin Assessment     NAME:  Angélica Gonzalez  YOB: 1985  MEDICAL RECORD NUMBER:  [id number]    The patient is being assessed for  Admission    I agree that at least one RN has performed a thorough Head to Toe Skin Assessment on the patient. ALL assessment sites listed below have been assessed.      Areas assessed by both nurses:    Head, Face, Ears, Shoulders, Back, Chest, Arms, Elbows, Hands, Sacrum. Buttock, Coccyx, Ischium, Legs. Feet and Heels, and Under Medical Devices         Does the Patient have a Wound? No noted wound(s)       Randy Prevention initiated by RN: Yes  Wound Care Orders initiated by RN: No    Pressure Injury (Stage 3,4, Unstageable, DTI, NWPT, and Complex wounds) if present, place Wound referral order by RN under : No    New Ostomies, if present place, Ostomy referral order under : No     Nurse 1 eSignature: Electronically signed by Marina Nava RN on 1/6/24 at 4:37 AM EST    **SHARE this note so that the co-signing nurse can place an eSignature**    Nurse 2 eSignature: Electronically signed by Lakia Arevalo RN on 1/6/24 at 7:10 AM EST

## 2024-01-06 NOTE — ACP (ADVANCE CARE PLANNING)
Advanced Care Planning Note.    Purpose of Encounter: Advanced care planning in light of alcoholic cirrhosis  Parties In Attendance: Patient  Decisional Capacity: Yes  Subjective: Patient is dizzy  Objective: Cr < 0.5  Goals of Care Determination: Patient wants full support (CPR, vent, surgery, HD, trach, PEG)  Plan:  Renal and GI consults.  PRBC  Code Status: Full code   Time spent on Advanced care Plannin minutes  Advanced Care Planning Documents: Completed advanced directives on chart, mother is the POA.    Tree Dutta MD  2024 3:36 PM

## 2024-01-06 NOTE — CONSULTS
[facility]  Consultation Note    Reason for Consult:  Acute on Chronic Hyponatremia  Requesting Physician:  Dr. JAMEY Dutta    CHIEF COMPLAINT:  Dizziness    History obtained from records and patient.    HISTORY OF PRESENT ILLNESS:                Angélica Gonzalez  is 38 y.o. y.o. female with significant past medical history of Alcoholic Liver Cirrhosis, Hyponatremia, best Na around 128 since 11/2023, ADHD who presents with dizziness s/p paracentesis yesterday with removal of 7.5L.  Lowest sbp in the 90's range.  On Lasix 20mg daily and SPLT 50mg daily as outpatient.  No H/O Seizures.  No regular NSAID use.  Has nausea.  Last alcohol drink around September 2023.  Family at bedside.     Past Medical History:     has a past medical history of ADHD, Cirrhosis (HCC), ETOH abuse, and Hepatic cirrhosis (HCC).   Past Surgical History:     has a past surgical history that includes Paracentesis (10/14/2023) and Upper gastrointestinal endoscopy (N/A, 11/15/2023).   Current Medications:    Current Facility-Administered Medications: amphetamine-dextroamphetamine (ADDERALL XR) extended release capsule 20 mg, 20 mg, Oral, QAM  folic acid (FOLVITE) tablet 1 mg, 1 mg, Oral, Daily  pantoprazole (PROTONIX) tablet 40 mg, 40 mg, Oral, QAM AC  traMADol (ULTRAM) tablet 50 mg, 50 mg, Oral, Q6H PRN  sodium chloride flush 0.9 % injection 5-40 mL, 5-40 mL, IntraVENous, 2 times per day  sodium chloride flush 0.9 % injection 5-40 mL, 5-40 mL, IntraVENous, PRN  0.9 % sodium chloride infusion, , IntraVENous, PRN  potassium chloride (KLOR-CON M) extended release tablet 40 mEq, 40 mEq, Oral, PRN **OR** potassium bicarb-citric acid (EFFER-K) effervescent tablet 40 mEq, 40 mEq, Oral, PRN **OR** potassium chloride 10 mEq/100 mL IVPB (Peripheral Line), 10 mEq, IntraVENous, PRN  magnesium sulfate 2000 mg in 50 mL IVPB premix, 2,000 mg, IntraVENous, PRN  ondansetron (ZOFRAN-ODT) disintegrating tablet 4 mg, 4 mg, Oral,

## 2024-01-06 NOTE — PROGRESS NOTES
Hospitalist Progress Note      PCP: No primary care provider on file.    Date of Admission: 1/6/2024    Chief Complaint: Dizziness    Hospital Course: 39 yo F with alcoholic cirrhosis, esophageal varices, portal hypertensive gastropathy with gastritis, ADHD came to ER with dizziness.  Had 7.5 L paracentesis on 1/5/24.  Admitted as inpatient for dizziness, acute anemia with acute hyponatremia.  Given Albumin and transfused 1 U PRBC.  Followed by GI and Renal.  Diuretics held.    Subjective:  Patient feels dizzy.  No CP, SOB, HA or abdominal pain.       Medications:  Reviewed    Infusion Medications    sodium chloride      sodium chloride       Scheduled Medications    amphetamine-dextroamphetamine  20 mg Oral QAM    folic acid  1 mg Oral Daily    pantoprazole  40 mg Oral QAM AC    sodium chloride flush  5-40 mL IntraVENous 2 times per day     PRN Meds: traMADol, sodium chloride flush, sodium chloride, potassium chloride **OR** potassium alternative oral replacement **OR** potassium chloride, magnesium sulfate, ondansetron **OR** ondansetron, polyethylene glycol, acetaminophen **OR** acetaminophen, sodium chloride      Intake/Output Summary (Last 24 hours) at 1/6/2024 1524  Last data filed at 1/6/2024 1409  Gross per 24 hour   Intake 362.08 ml   Output --   Net 362.08 ml       Physical Exam Performed:    BP (!) (P) 93/56   Pulse (P) 74   Temp (P) 97.6 °F (36.4 °C) (Oral)   Resp (P) 18   Ht 1.676 m (5' 6\")   Wt 93.4 kg (206 lb)   SpO2 (P) 100%   BMI 33.25 kg/m²     General appearance: No apparent distress, appears stated age and cooperative.    HEENT: Pupils equal, round, and reactive to light. Scleral icterus  Neck: Supple, with full range of motion. No jugular venous distention. Trachea midline.  Respiratory:  Normal respiratory effort. Clear to auscultation, bilaterally without Rales/Wheezes/Rhonchi.  Cardiovascular: Regular rate and rhythm with normal S1/S2 without murmurs, rubs or gallops.  Abdomen:

## 2024-01-06 NOTE — H&P
V2.0  History and Physical      Name:  Angélica Gonzalez /Age/Sex: 1985  (38 y.o. female)   MRN & CSN:  [id number] & [id number] Encounter Date/Time: 2024 3:49 AM EST   Location:  [facility]5571/5571-01 PCP: No primary care provider on file.       Hospital Day: 1    Assessment and Plan:   Angélica Gonzalez is a 38 y.o. female with a pmh of alcoholic liver cirrhosis with recurrent paracentesis who presents with dizziness following paracentesis yesterday with removal of 7.5 L.  Also hyponatremic in the outside ED labs.  Hospital Problems             Last Modified POA    * (Principal) Dizziness 2024 Yes       Plan:  # Dizziness likely d/t large volume fluid removal from paracentesis on .  # Alcoholic liver cirrhosis with ascites.  # Multiple paracentesis.  # Jaundice  # Elevated bilirubin.  -Paracentesis on  with removal of 7.5 L.  [facility]  -Stat CBC, BMP and hepatic panel.  -Borderline hypotension and with reported dizziness-holding home diuresis and spironolactone pending GI and nephro recommendation.  -Fall precautions.  -Albumin ordered.  -Consult GI    # Hyponatremia.  Likely hypervolemic hyponatremia .reported sodium of 120 in outside ED.  Pending BMP.  Consult nephrology.    # History of alcohol abuse.  Denies recent use.  Monitor.    Disposition:   Current Living situation: Lives alone at home.  Expected Disposition: Home  Estimated D/C: 1 to 2 days    Diet Diet NPO   DVT Prophylaxis [] Lovenox, []  Heparin, [x] SCDs, [] Ambulation,  [] Eliquis, [] Xarelto, [] Coumadin   Code Status Full Code   Surrogate Decision Maker/ POA Self, Mother     Personally reviewed Lab Studies and Imaging     Discussed management of the case with Dr. Sena who recommended continue current poc    EKG interpreted personally and results none    Imaging that was interpreted personally includes none and results none    Drugs that require monitoring for toxicity include none and the  \"BACTERIA\", \"CLARITYU\", \"SPECGRAV\", \"LEUKOCYTESUR\", \"UROBILINOGEN\", \"BILIRUBINUR\", \"BLOODU\", \"GLUCOSEU\", \"KETUA\", \"AMORPHOUS\"  Urine Cultures: No results found for: \"LABURIN\"  Blood Cultures: No results found for: \"BC\"  No results found for: \"BLOODCULT2\"  Organism: No results found for: \"ORG\"    Imaging/Diagnostics Last 24 Hours   IR US GUIDED PARACENTESIS    Result Date: 1/5/2024  PROCEDURE: PARACENTESIS WITHOUT IMAGE GUIDANCE US ABDOMEN LIMITED 1/5/2024 HISTORY: ORDERING SYSTEM PROVIDED HISTORY: Other ascites TECHNOLOGIST PROVIDED HISTORY: Does fluid need testing?  If yes, please place LAB Orders.->Yes Is the procedure for Diagnostic or Therapeutic reasons?->Therapeutic Is the procedure for Diagnostic or Therapeutic reasons?->Diagnostic TECHNIQUE: Informed consent was obtained after a detailed explanation of the procedure including risks, benefits, and alternatives.  Universal protocol was followed.  A limited ultrasound of the abdomen was performed. The right abdomen was prepped and draped in sterile fashion and local anesthesia was achieved with lidocaine.  A 5 Nepali needle sheath was advanced into ascites and paracentesis was performed.  The patient tolerated the procedure well. Estimated blood loss: Less than 5 cc FINDINGS: Limited ultrasound of the abdomen demonstrates ascites. A total of 7.5 L was removed.     Successful paracentesis.         Electronically signed by MOO Biswas CNP on 1/6/2024 at 3:49 AM

## 2024-01-06 NOTE — PLAN OF CARE
Problem: Safety - Adult  Goal: Free from fall injury  1/6/2024 1300 by Elen Mackey, RN  Outcome: Progressing     Problem: ABCDS Injury Assessment  Goal: Absence of physical injury  1/6/2024 1300 by Elen Mackey, RN  Outcome: Progressing

## 2024-01-07 LAB
ACANTHOCYTES BLD QL SMEAR: ABNORMAL
ANION GAP SERPL CALCULATED.3IONS-SCNC: 8 MMOL/L (ref 3–16)
ANISOCYTOSIS BLD QL SMEAR: ABNORMAL
BACTERIA FLD AEROBE CULT: NORMAL
BASOPHILS # BLD: 0 K/UL (ref 0–0.2)
BASOPHILS NFR BLD: 0 %
BUN SERPL-MCNC: 22 MG/DL (ref 7–20)
CALCIUM SERPL-MCNC: 8.5 MG/DL (ref 8.3–10.6)
CHLORIDE SERPL-SCNC: 91 MMOL/L (ref 99–110)
CO2 SERPL-SCNC: 22 MMOL/L (ref 21–32)
CREAT SERPL-MCNC: <0.5 MG/DL (ref 0.6–1.1)
DEPRECATED RDW RBC AUTO: 18.4 % (ref 12.4–15.4)
EOSINOPHIL # BLD: 0 K/UL (ref 0–0.6)
EOSINOPHIL NFR BLD: 0 %
GFR SERPLBLD CREATININE-BSD FMLA CKD-EPI: >60 ML/MIN/{1.73_M2}
GLUCOSE SERPL-MCNC: 108 MG/DL (ref 70–99)
GRAM STN SPEC: NORMAL
HCT VFR BLD AUTO: 24.3 % (ref 36–48)
HGB BLD-MCNC: 8.4 G/DL (ref 12–16)
LYMPHOCYTES # BLD: 1 K/UL (ref 1–5.1)
LYMPHOCYTES NFR BLD: 9 %
MACROCYTES BLD QL SMEAR: ABNORMAL
MCH RBC QN AUTO: 36.8 PG (ref 26–34)
MCHC RBC AUTO-ENTMCNC: 34.5 G/DL (ref 31–36)
MCV RBC AUTO: 106.8 FL (ref 80–100)
METAMYELOCYTES NFR BLD MANUAL: 1 %
MONOCYTES # BLD: 0.9 K/UL (ref 0–1.3)
MONOCYTES NFR BLD: 8 %
NEUTROPHILS # BLD: 9.3 K/UL (ref 1.7–7.7)
NEUTROPHILS NFR BLD: 78 %
NEUTS BAND NFR BLD MANUAL: 4 % (ref 0–7)
PLATELET # BLD AUTO: 81 K/UL (ref 135–450)
PLATELET BLD QL SMEAR: ABNORMAL
PMV BLD AUTO: 6.5 FL (ref 5–10.5)
POLYCHROMASIA BLD QL SMEAR: ABNORMAL
POTASSIUM SERPL-SCNC: 4.9 MMOL/L (ref 3.5–5.1)
RBC # BLD AUTO: 2.28 M/UL (ref 4–5.2)
SLIDE REVIEW: ABNORMAL
SODIUM SERPL-SCNC: 121 MMOL/L (ref 136–145)
WBC # BLD AUTO: 11.2 K/UL (ref 4–11)

## 2024-01-07 PROCEDURE — 6370000000 HC RX 637 (ALT 250 FOR IP): Performed by: INTERNAL MEDICINE

## 2024-01-07 PROCEDURE — 6360000002 HC RX W HCPCS: Performed by: NURSE PRACTITIONER

## 2024-01-07 PROCEDURE — 80048 BASIC METABOLIC PNL TOTAL CA: CPT

## 2024-01-07 PROCEDURE — 2580000003 HC RX 258: Performed by: NURSE PRACTITIONER

## 2024-01-07 PROCEDURE — 36415 COLL VENOUS BLD VENIPUNCTURE: CPT

## 2024-01-07 PROCEDURE — 6370000000 HC RX 637 (ALT 250 FOR IP): Performed by: NURSE PRACTITIONER

## 2024-01-07 PROCEDURE — 85025 COMPLETE CBC W/AUTO DIFF WBC: CPT

## 2024-01-07 PROCEDURE — 94760 N-INVAS EAR/PLS OXIMETRY 1: CPT

## 2024-01-07 PROCEDURE — 1200000000 HC SEMI PRIVATE

## 2024-01-07 RX ORDER — PROCHLORPERAZINE EDISYLATE 5 MG/ML
5 INJECTION INTRAMUSCULAR; INTRAVENOUS ONCE
Status: COMPLETED | OUTPATIENT
Start: 2024-01-07 | End: 2024-01-07

## 2024-01-07 RX ORDER — FUROSEMIDE 20 MG/1
20 TABLET ORAL 2 TIMES DAILY
Status: DISCONTINUED | OUTPATIENT
Start: 2024-01-07 | End: 2024-01-09

## 2024-01-07 RX ADMIN — Medication 10 ML: at 09:35

## 2024-01-07 RX ADMIN — ONDANSETRON 4 MG: 2 INJECTION INTRAMUSCULAR; INTRAVENOUS at 19:00

## 2024-01-07 RX ADMIN — FUROSEMIDE 20 MG: 20 TABLET ORAL at 11:39

## 2024-01-07 RX ADMIN — ONDANSETRON 4 MG: 2 INJECTION INTRAMUSCULAR; INTRAVENOUS at 05:09

## 2024-01-07 RX ADMIN — Medication 15 G: at 09:35

## 2024-01-07 RX ADMIN — FOLIC ACID 1 MG: 1 TABLET ORAL at 09:35

## 2024-01-07 RX ADMIN — FUROSEMIDE 20 MG: 20 TABLET ORAL at 17:20

## 2024-01-07 RX ADMIN — TRAMADOL HYDROCHLORIDE 50 MG: 50 TABLET ORAL at 16:26

## 2024-01-07 RX ADMIN — Medication 15 G: at 20:05

## 2024-01-07 RX ADMIN — Medication 10 ML: at 20:06

## 2024-01-07 RX ADMIN — PROCHLORPERAZINE EDISYLATE 5 MG: 5 INJECTION INTRAMUSCULAR; INTRAVENOUS at 20:43

## 2024-01-07 RX ADMIN — TRAMADOL HYDROCHLORIDE 50 MG: 50 TABLET ORAL at 03:16

## 2024-01-07 RX ADMIN — PANTOPRAZOLE SODIUM 40 MG: 40 TABLET, DELAYED RELEASE ORAL at 05:09

## 2024-01-07 ASSESSMENT — ENCOUNTER SYMPTOMS
ABDOMINAL DISTENTION: 1
VOMITING: 0
EYE DISCHARGE: 0
DIARRHEA: 0
ABDOMINAL PAIN: 1
FACIAL SWELLING: 0
NAUSEA: 0
EYE REDNESS: 0
CONSTIPATION: 0
PHOTOPHOBIA: 0
SHORTNESS OF BREATH: 0
BLOOD IN STOOL: 0
CHEST TIGHTNESS: 0
COUGH: 0

## 2024-01-07 ASSESSMENT — PAIN DESCRIPTION - DESCRIPTORS: DESCRIPTORS: ACHING

## 2024-01-07 ASSESSMENT — PAIN DESCRIPTION - LOCATION: LOCATION: GENERALIZED

## 2024-01-07 ASSESSMENT — PAIN SCALES - GENERAL
PAINLEVEL_OUTOF10: 0
PAINLEVEL_OUTOF10: 5
PAINLEVEL_OUTOF10: 7
PAINLEVEL_OUTOF10: 2

## 2024-01-07 ASSESSMENT — PAIN DESCRIPTION - ORIENTATION: ORIENTATION: RIGHT;LEFT

## 2024-01-07 NOTE — DISCHARGE INSTR - COC
Continuity of Care Form    Patient Name: Angélica Gonzalez   :  1985  MRN:  [id number]    Admit date:  2024  Discharge date:  ***    Code Status Order: Full Code   Advance Directives:     Admitting Physician:  Gautam Siddiqui MD  PCP: No primary care provider on file.    Discharging Nurse: ***  Discharging Hospital Unit/Room#: [id number]-[id number]/5571-01  Discharging Unit Phone Number: ***    Emergency Contact:   Extended Emergency Contact Information  Primary Emergency Contact: Trinh Gonzalez  Home Phone: [phone]  Relation: Parent  Secondary Emergency Contact: Jag Gonzalez  Home Phone: [phone]  Relation: Parent    Past Surgical History:  Past Surgical History:   Procedure Laterality Date    PARACENTESIS  10/14/2023    UPPER GASTROINTESTINAL ENDOSCOPY N/A 11/15/2023    EGD BIOPSY performed by Jone Burns MD at [facility] END[facility]       Immunization History:     There is no immunization history on file for this patient.    Active Problems:  Patient Active Problem List   Diagnosis Code    Dizziness R42    Hyponatremia E87.1       Isolation/Infection:   Isolation            No Isolation          Patient Infection Status       None to display            Nurse Assessment:  Last Vital Signs: BP (!) 100/51   Pulse 70   Temp 98.6 °F (37 °C) (Oral)   Resp 16   Ht 1.676 m (5' 6\")   Wt 93.4 kg (206 lb)   SpO2 99%   BMI 33.25 kg/m²     Last documented pain score (0-10 scale): Pain Level: 0  Last Weight:   Wt Readings from Last 1 Encounters:   24 93.4 kg (206 lb)     Mental Status:  {IP PT MENTAL STATUS:}    IV Access:  { YON IV ACCESS:285864768}    Nursing Mobility/ADLs:  Walking   {CHP DME ADLs:397060236}  Transfer  {CHP DME ADLs:017394160}  Bathing  {CHP DME ADLs:166440935}  Dressing  {CHP DME ADLs:026838509}  Toileting  {CHP DME ADLs:503785574}  Feeding  {CHP DME ADLs:709042499}  Med Admin  {CHP DME ADLs:689411717}  Med Delivery   { YON MED Delivery:294576884}    Wound Care

## 2024-01-07 NOTE — PROGRESS NOTES
[facility] Note    Patient Active Problem List   Diagnosis    Dizziness    Hyponatremia       Past Medical History:   has a past medical history of ADHD, Cirrhosis (HCC), ETOH abuse, and Hepatic cirrhosis (HCC).    Past Social History:   reports that she has never smoked. She has never used smokeless tobacco. She reports current alcohol use. She reports that she does not use drugs.    Subjective:    Intermittent abdominal pain    Review of Systems   Constitutional:  Positive for fatigue. Negative for activity change, appetite change, chills, fever and unexpected weight change.   HENT:  Negative for congestion and facial swelling.    Eyes:  Negative for photophobia, discharge and redness.   Respiratory:  Negative for cough, chest tightness and shortness of breath.    Cardiovascular:  Positive for leg swelling. Negative for chest pain and palpitations.   Gastrointestinal:  Positive for abdominal distention and abdominal pain. Negative for blood in stool, constipation, diarrhea, nausea and vomiting.   Endocrine: Negative for cold intolerance, heat intolerance and polyuria.   Genitourinary:  Negative for decreased urine volume, difficulty urinating, flank pain and hematuria.   Musculoskeletal:  Negative for joint swelling and neck pain.   Neurological:  Negative for dizziness, seizures, syncope, speech difficulty, light-headedness and headaches.   Hematological:  Does not bruise/bleed easily.   Psychiatric/Behavioral:  Negative for agitation, confusion and hallucinations.        Objective:      BP (!) 107/59   Pulse 72   Temp 97.6 °F (36.4 °C) (Oral)   Resp 16   Ht 1.676 m (5' 6\")   Wt 93.4 kg (206 lb)   SpO2 98%   BMI 33.25 kg/m²     Wt Readings from Last 3 Encounters:   01/06/24 93.4 kg (206 lb)   11/15/23 83.9 kg (184 lb 14.4 oz)       BP Readings from Last 3 Encounters:   01/07/24 (!) 107/59   01/05/24 (!) 96/56   12/20/23 (!) 97/54     Skin-jaundiced  Chest-  clear  Heart-regular  Abd-soft, distended  Ext- 1+ edema    Labs  Hemoglobin   Date Value Ref Range Status   01/07/2024 8.4 (L) 12.0 - 16.0 g/dL Final     Hematocrit   Date Value Ref Range Status   01/07/2024 24.3 (L) 36.0 - 48.0 % Final     WBC   Date Value Ref Range Status   01/07/2024 11.2 (H) 4.0 - 11.0 K/uL Final     Platelets   Date Value Ref Range Status   01/07/2024 81 (L) 135 - 450 K/uL Final     Lab Results   Component Value Date    CREATININE <0.5 (L) 01/07/2024    BUN 22 (H) 01/07/2024     (L) 01/07/2024    K 4.9 01/07/2024    CL 91 (L) 01/07/2024    CO2 22 01/07/2024     Uric acid 3.6  Sosm 262    Alb 2.9  Uosm 678  Rodrigo 42    Assessment/Plan:   Acute on Chronic Hyponatremia-goal Na in the next 24 hours around 127-129.  Euvolemic.    Recent paracentesis+nausea.  Increase ADH state.  1.5L/24H fluid restriction.   Start Lasix 20mg po bid.  Start urea 15g bid.  Follow ammonia level.     Relative Hypotension-better   Anemia-follow Hgb closely.  Prbc transfusion done with improved Hgb.    H/O Alcoholic Liver Cirrhosis-last alcohol use about 9/2023.   Hypoalbuminemia  H/O Ascites-s/p paracentesis 1/5/24, 7.5L removed.   Portal HTN with ascites and esophageal varices-GI following    Tish Pollock MD

## 2024-01-07 NOTE — PROGRESS NOTES
Gastroenterology Progress Note            Angélica Gonzalez is a 38 y.o. female patient.  No diagnosis found.    SUBJECTIVE: She did get a little lightheaded earlier this morning but overall does feel better.    Physical    VITALS:  BP (!) 107/59   Pulse 72   Temp 97.6 °F (36.4 °C) (Oral)   Resp 16   Ht 1.676 m (5' 6\")   Wt 93.4 kg (206 lb)   SpO2 98%   BMI 33.25 kg/m²   TEMPERATURE:  Current - Temp: 97.6 °F (36.4 °C); Max - Temp  Av.7 °F (36.5 °C)  Min: 97.5 °F (36.4 °C)  Max: 98.1 °F (36.7 °C)    Abdomen soft, distended with ascites.  Not tense NT,  Bowel sounds normal  She has 2+ bilateral edema in the lower extremities.  Alert and oriented x 3.  Jaundiced and icteric  No asterixis    Data      Recent Labs     24  1551 24  0628   WBC 8.3  --  11.2*   HGB 6.8* 8.5* 8.4*   HCT 18.4* 24.3* 24.3*   .6*  --  106.8*   PLT 75*  --  81*     Recent Labs     242 24  1553 24  2158 24  0628   * 122* 119* 121*   K 4.9  --   --  4.9   CL 91*  --   --  91*   CO2 22  --   --  22   BUN 12  --   --  22*   CREATININE <0.5*  --   --  <0.5*     Recent Labs     24   AST 60*   ALT 23   BILIDIR 6.5*   BILITOT 14.2*   ALKPHOS 173*     No results for input(s): \"LIPASE\", \"AMYLASE\" in the last 72 hours.          ASSESSMENT   Alcoholic cirrhosis. Current MELD 33    She has seen social work at .  She has an appointment with the transplant center later this month in January.  Portal hypertension with ascites- s/p large volume paracentesis yesterday.  Ascites negative for SBP.  Hyponatremia-with sodium of 121 now.  Esophageal varices-currently on carvedilol no previous GI bleeding  History of alcohol use.  She has not had any since 2023.  She is doing counseling.  Lightheadedness-most likely combination of large volume paracentesis and hyponatremia.        PLAN    IV albumin  Correction of hyponatremia slowly  Appreciate

## 2024-01-07 NOTE — PROGRESS NOTES
Hospitalist Progress Note      PCP: No primary care provider on file.    Date of Admission: 1/6/2024    Chief Complaint: Dizziness    Hospital Course: 39 yo F with alcoholic cirrhosis, esophageal varices, portal hypertensive gastropathy with gastritis, ADHD came to ER with dizziness.  Had 7.5 L paracentesis on 1/5/24.  Admitted as inpatient for dizziness, acute anemia with acute hyponatremia.  Given Albumin and transfused 1 U PRBC.  Followed by GI and Renal.  Diuretics held.    Subjective:  Patient feels less dizzy.  No CP, SOB, HA or abdominal pain.       Medications:  Reviewed    Infusion Medications    sodium chloride      sodium chloride       Scheduled Medications    furosemide  20 mg Oral BID    amphetamine-dextroamphetamine  20 mg Oral QAM    folic acid  1 mg Oral Daily    pantoprazole  40 mg Oral QAM AC    sodium chloride flush  5-40 mL IntraVENous 2 times per day    urea  15 g Oral BID     PRN Meds: traMADol, sodium chloride flush, sodium chloride, potassium chloride **OR** potassium alternative oral replacement **OR** potassium chloride, magnesium sulfate, ondansetron **OR** ondansetron, polyethylene glycol, acetaminophen **OR** acetaminophen, sodium chloride      Intake/Output Summary (Last 24 hours) at 1/7/2024 1555  Last data filed at 1/7/2024 0932  Gross per 24 hour   Intake 1050 ml   Output --   Net 1050 ml         Physical Exam Performed:    BP (!) 100/51   Pulse 70   Temp 98.6 °F (37 °C) (Oral)   Resp 16   Ht 1.676 m (5' 6\")   Wt 93.4 kg (206 lb)   SpO2 99%   BMI 33.25 kg/m²     General appearance: No apparent distress, appears stated age and cooperative.    HEENT: Pupils equal, round, and reactive to light. Scleral icterus  Neck: Supple, with full range of motion. No jugular venous distention. Trachea midline.  Respiratory:  Normal respiratory effort. Clear to auscultation, bilaterally without Rales/Wheezes/Rhonchi.  Cardiovascular: Regular rate and rhythm with normal S1/S2 without

## 2024-01-07 NOTE — CARE COORDINATION
Discharge Planning Note:    Chart reviewed and it appears that patient has minimal needs for discharge at this time. Discussed with patient’s nurse and requested that case management be notified if discharge needs are identified.     - Current discharge plan is for the patient to return home.    - PCP: [facility], Dee Dee Pinon MD    Case management will continue to follow progress and update discharge plan as needed.      Risk of Readmission Score: 14%    DENA Castrejon RN    [facility]  Phone: [phone]

## 2024-01-07 NOTE — PROGRESS NOTES
Critical Na received from lab, perfectserve sent to Nephrology about results, orders obtained, medicated per MAR

## 2024-01-08 LAB
ACANTHOCYTES BLD QL SMEAR: ABNORMAL
AMMONIA PLAS-SCNC: 62 UMOL/L (ref 11–51)
ANION GAP SERPL CALCULATED.3IONS-SCNC: 8 MMOL/L (ref 3–16)
ANISOCYTOSIS BLD QL SMEAR: ABNORMAL
BACTERIA FLD AEROBE CULT: NORMAL
BASOPHILS # BLD: 0 K/UL (ref 0–0.2)
BASOPHILS NFR BLD: 0 %
BUN SERPL-MCNC: 23 MG/DL (ref 7–20)
CALCIUM SERPL-MCNC: 8.4 MG/DL (ref 8.3–10.6)
CHLORIDE SERPL-SCNC: 91 MMOL/L (ref 99–110)
CO2 SERPL-SCNC: 24 MMOL/L (ref 21–32)
CREAT SERPL-MCNC: <0.5 MG/DL (ref 0.6–1.1)
DEPRECATED RDW RBC AUTO: 18.4 % (ref 12.4–15.4)
EOSINOPHIL # BLD: 0.5 K/UL (ref 0–0.6)
EOSINOPHIL NFR BLD: 6 %
GFR SERPLBLD CREATININE-BSD FMLA CKD-EPI: >60 ML/MIN/{1.73_M2}
GLUCOSE SERPL-MCNC: 91 MG/DL (ref 70–99)
GRAM STN SPEC: NORMAL
HCT VFR BLD AUTO: 22.6 % (ref 36–48)
HGB BLD-MCNC: 8.2 G/DL (ref 12–16)
LYMPHOCYTES # BLD: 1.2 K/UL (ref 1–5.1)
LYMPHOCYTES NFR BLD: 13 %
MCH RBC QN AUTO: 38.8 PG (ref 26–34)
MCHC RBC AUTO-ENTMCNC: 36.3 G/DL (ref 31–36)
MCV RBC AUTO: 106.9 FL (ref 80–100)
MONOCYTES # BLD: 0.6 K/UL (ref 0–1.3)
MONOCYTES NFR BLD: 7 %
NEUTROPHILS # BLD: 6.7 K/UL (ref 1.7–7.7)
NEUTROPHILS NFR BLD: 74 %
PATH CONSULT FLUID: NORMAL
PLATELET # BLD AUTO: 73 K/UL (ref 135–450)
PLATELET BLD QL SMEAR: ABNORMAL
PMV BLD AUTO: 6 FL (ref 5–10.5)
POLYCHROMASIA BLD QL SMEAR: ABNORMAL
POTASSIUM SERPL-SCNC: 5 MMOL/L (ref 3.5–5.1)
RBC # BLD AUTO: 2.12 M/UL (ref 4–5.2)
SLIDE REVIEW: ABNORMAL
SODIUM SERPL-SCNC: 120 MMOL/L (ref 136–145)
SODIUM SERPL-SCNC: 123 MMOL/L (ref 136–145)
WBC # BLD AUTO: 9.1 K/UL (ref 4–11)

## 2024-01-08 PROCEDURE — 36415 COLL VENOUS BLD VENIPUNCTURE: CPT

## 2024-01-08 PROCEDURE — 6370000000 HC RX 637 (ALT 250 FOR IP): Performed by: INTERNAL MEDICINE

## 2024-01-08 PROCEDURE — 6360000002 HC RX W HCPCS: Performed by: NURSE PRACTITIONER

## 2024-01-08 PROCEDURE — P9047 ALBUMIN (HUMAN), 25%, 50ML: HCPCS | Performed by: INTERNAL MEDICINE

## 2024-01-08 PROCEDURE — 6360000002 HC RX W HCPCS: Performed by: INTERNAL MEDICINE

## 2024-01-08 PROCEDURE — 2580000003 HC RX 258: Performed by: NURSE PRACTITIONER

## 2024-01-08 PROCEDURE — 1200000000 HC SEMI PRIVATE

## 2024-01-08 PROCEDURE — 6370000000 HC RX 637 (ALT 250 FOR IP): Performed by: NURSE PRACTITIONER

## 2024-01-08 PROCEDURE — 82140 ASSAY OF AMMONIA: CPT

## 2024-01-08 PROCEDURE — 85025 COMPLETE CBC W/AUTO DIFF WBC: CPT

## 2024-01-08 PROCEDURE — 80048 BASIC METABOLIC PNL TOTAL CA: CPT

## 2024-01-08 PROCEDURE — 84295 ASSAY OF SERUM SODIUM: CPT

## 2024-01-08 RX ORDER — ALBUMIN (HUMAN) 12.5 G/50ML
25 SOLUTION INTRAVENOUS EVERY 8 HOURS
Status: COMPLETED | OUTPATIENT
Start: 2024-01-09 | End: 2024-01-09

## 2024-01-08 RX ADMIN — Medication 15 G: at 19:52

## 2024-01-08 RX ADMIN — FUROSEMIDE 20 MG: 20 TABLET ORAL at 09:33

## 2024-01-08 RX ADMIN — ALBUMIN (HUMAN) 25 G: 0.25 INJECTION, SOLUTION INTRAVENOUS at 23:47

## 2024-01-08 RX ADMIN — Medication 10 ML: at 10:12

## 2024-01-08 RX ADMIN — Medication 15 G: at 09:34

## 2024-01-08 RX ADMIN — ONDANSETRON 4 MG: 2 INJECTION INTRAMUSCULAR; INTRAVENOUS at 23:15

## 2024-01-08 RX ADMIN — PANTOPRAZOLE SODIUM 40 MG: 40 TABLET, DELAYED RELEASE ORAL at 06:00

## 2024-01-08 RX ADMIN — FOLIC ACID 1 MG: 1 TABLET ORAL at 09:33

## 2024-01-08 RX ADMIN — FUROSEMIDE 20 MG: 20 TABLET ORAL at 18:16

## 2024-01-08 RX ADMIN — DEXTROAMPHETAMINE SACCHARATE, AMPHETAMINE ASPARTATE MONOHYDRATE, DEXTROAMPHETAMINE SULFATE AND AMPHETAMINE SULFATE 20 MG: 2.5; 2.5; 2.5; 2.5 CAPSULE, EXTENDED RELEASE ORAL at 10:19

## 2024-01-08 RX ADMIN — Medication 10 ML: at 19:52

## 2024-01-08 ASSESSMENT — PAIN SCALES - GENERAL
PAINLEVEL_OUTOF10: 1
PAINLEVEL_OUTOF10: 2
PAINLEVEL_OUTOF10: 0
PAINLEVEL_OUTOF10: 0

## 2024-01-08 NOTE — PROGRESS NOTES
Progress Note    Patient Angélica Gonzalez  MRN: [id number]  YOB: 1985 Age: 38 y.o. Sex: female  Room: [id number]       Admitting Physician: Gautam Siddiqui MD   Date of Admission: 1/6/2024  2:31 AM   Primary Care Physician: No primary care provider on file.     Subjective:  Angélica Gonzalez was seen and examined. We are following for cirrhosis.  -- She feels better today    ROS:  Constitutional: Denies fever, no change in appetite  Respiratory: Denies cough or shortness of breath  Cardiovascular: Denies chest pain or edema    Objective:  Vital Signs:   Vitals:    01/08/24 1101   BP: (!) 103/55   Pulse: 79   Resp: 18   Temp: 97.6 °F (36.4 °C)   SpO2: 98%         Physical Exam:  Constitutional: Alert and oriented x 4. No acute distress.   HEENT: Sclera anicteric, mucosal membranes moist  Cardiovascular: Regular rate and rhythm.  No murmurs.  Respiratory: Respirations nonlabored, no crepitus  GI: Abdomen distended, soft, and nontender.  Normal active bowel sounds.  No masses palpable.   Rectal: Deferred  Musculoskeletal: pitting edema of the lower legs.  Neurological: Gross memory appears intact.  no Asterixis    Intake/Output:    Intake/Output Summary (Last 24 hours) at 1/8/2024 1457  Last data filed at 1/8/2024 0535  Gross per 24 hour   Intake 360 ml   Output --   Net 360 ml        Current Medications:  Current Facility-Administered Medications   Medication Dose Route Frequency Provider Last Rate Last Admin    furosemide (LASIX) tablet 20 mg  20 mg Oral BID Tish Pollock MD   20 mg at 01/08/24 0933    amphetamine-dextroamphetamine (ADDERALL XR) extended release capsule 20 mg  20 mg Oral QAM Ruthann Nunez APRN - CNP   20 mg at 01/08/24 1019    folic acid (FOLVITE) tablet 1 mg  1 mg Oral Daily Ruthann Nunez APRN - CNP   1 mg at 01/08/24 0933    pantoprazole (PROTONIX) tablet 40 mg  40 mg Oral QAM AC Ruthann Nunez APRN - CNP   40 mg at 01/08/24 0600    traMADol  counseling.  Lightheadedness-most likely combination of large volume paracentesis and hyponatremia.     Abdomen appears to be distended.  Will plan for paracentesis tomorrow     PLAN    IV albumin  Correction of hyponatremia slowly  Appreciate neprhology consult  Continue Lasix and Aldactone  Continue alcohol abstinence  Paracentesis tomorrow         JESSE JENNINGS MD    [facility]    [phone]. Also available via Perfect Serve    Please note that some or all of this record was generated using voice recognition software. If there are any questions about the content of this document, please contact the author as some errors in translation may have occurred.

## 2024-01-08 NOTE — PLAN OF CARE
Problem: Safety - Adult  Goal: Free from fall injury  Outcome: Progressing     Problem: ABCDS Injury Assessment  Goal: Absence of physical injury  Outcome: Progressing     Problem: Pain  Goal: Verbalizes/displays adequate comfort level or baseline comfort level  Outcome: Progressing

## 2024-01-08 NOTE — PROGRESS NOTES
(H) 01/08/2024     (L) 01/08/2024    K 5.0 01/08/2024    CL 91 (L) 01/08/2024    CO2 24 01/08/2024     Uric acid 3.6  Sosm 262    Alb 2.9  Uosm 678  Rodrigo 42    Assessment/Plan:   Acute on Chronic Hyponatremia-goal Na in the next 24 hours around 127-129.  Euvolemic.    Recent paracentesis+nausea.  Increase ADH state.  1.5L/24H fluid restriction.     At home to Lasix-restarted Lasix 20mg po bid.    Start urea 15g bid.  Azotemia noted, monitor  Next can resume home Aldactone 25 mg, extra 50 mg a day  Follow ammonia level.     Relative Hypotension-better   Anemia-follow Hgb closely.  Prbc transfusion done with improved Hgb.    H/O Alcoholic Liver Cirrhosis-last alcohol use about 9/2023.   Hypoalbuminemia  H/O Ascites-s/p paracentesis 1/5/24, 7.5L removed.   Portal HTN with ascites and esophageal varices-GI following    Discharge plan from renal standpoint  -  expected on Wednesday  To allow slow improvement of severe hyponatremia, with high risk of osmotic demyelination with severe hyponatremia, female gender, with cirrhosis.      Medical decision making- high complexity. Multiple complex health problems.   Discussed with patient and and her mother 01/08/24  Discussed with treatment team.     Thank you for allowing me to participate in this patient's care. Please do not hesitate to contact me for any questions/concerns. We will follow along with you.     Yusef Carr MD  [facility]   Phone: [phone] or [address]  Fax: [phone]    Severally ill, at risk of impending organ failure needing  higher level of care/monitoring.   Time spent that included face-to-face meeting/discussion with patient, patient's family -as available, and treatment team (including primary/referring team and other consultants; included coordination of care with the treatment team; and review of patient's electronic medical records and ordering appropriates tests.

## 2024-01-08 NOTE — PROGRESS NOTES
Assessment complete. VSS. Patient sitting on the side of the bed. Respirations even and easy. Call light in reach. Fall precautions in place. No needs expressed at this time. Will continue to monitor.

## 2024-01-08 NOTE — PROGRESS NOTES
Hospitalist Progress Note      PCP: No primary care provider on file.    Date of Admission: 1/6/2024    Chief Complaint: Dizziness    Hospital Course: 39 yo F with alcoholic cirrhosis, esophageal varices, portal hypertensive gastropathy with gastritis, ADHD came to ER with dizziness.  Had 7.5 L paracentesis on 1/5/24.  Admitted as inpatient for dizziness, acute anemia with acute hyponatremia.  Given Albumin and transfused 1 U PRBC.  Followed by GI and Renal.  Diuretics held.  Lasix resumed on 1/7/24.  Paracentesis requested for 1/9.    Subjective:  Patient denies dizziness.  No CP, SOB, HA or abdominal pain.       Medications:  Reviewed    Infusion Medications    sodium chloride      sodium chloride       Scheduled Medications    furosemide  20 mg Oral BID    amphetamine-dextroamphetamine  20 mg Oral QAM    folic acid  1 mg Oral Daily    pantoprazole  40 mg Oral QAM AC    sodium chloride flush  5-40 mL IntraVENous 2 times per day    urea  15 g Oral BID     PRN Meds: traMADol, sodium chloride flush, sodium chloride, potassium chloride **OR** potassium alternative oral replacement **OR** potassium chloride, magnesium sulfate, ondansetron **OR** ondansetron, polyethylene glycol, acetaminophen **OR** acetaminophen, sodium chloride      Intake/Output Summary (Last 24 hours) at 1/8/2024 1845  Last data filed at 1/8/2024 0535  Gross per 24 hour   Intake 360 ml   Output --   Net 360 ml         Physical Exam Performed:    BP (!) 103/55   Pulse 79   Temp 97.6 °F (36.4 °C) (Oral)   Resp 18   Ht 1.676 m (5' 6\")   Wt 93.4 kg (206 lb)   SpO2 98%   BMI 33.25 kg/m²     General appearance: No apparent distress, appears stated age and cooperative.    HEENT: Pupils equal, round, and reactive to light. Scleral icterus  Neck: Supple, with full range of motion. No jugular venous distention. Trachea midline.  Respiratory:  Normal respiratory effort. Clear to auscultation, bilaterally without  Rales/Wheezes/Rhonchi.  Cardiovascular: Regular rate and rhythm with normal S1/S2 without murmurs, rubs or gallops.  Abdomen: Soft, non-tender, distended with ascites normal bowel sounds.  Musculoskeletal: No clubbing or cyanosis.  2+ edema bilaterally.  Full range of motion without deformity.  Skin: Jaundiced  Neurologic:  Neurovascularly intact without any focal sensory/motor deficits. Cranial nerves: II-XII intact, grossly non-focal.  Psychiatric: Alert and oriented, thought content appropriate, normal insight  Capillary Refill: Brisk, 3 seconds, normal   Peripheral Pulses: +2 palpable, equal bilaterally       Labs:   Recent Labs     01/06/24 0422 01/06/24  1551 01/07/24  0628 01/08/24  0714   WBC 8.3  --  11.2* 9.1   HGB 6.8* 8.5* 8.4* 8.2*   HCT 18.4* 24.3* 24.3* 22.6*   PLT 75*  --  81* 73*       Recent Labs     01/06/24 0422 01/06/24  1553 01/06/24  2158 01/07/24  0628 01/08/24  0714   *   < > 119* 121* 123*   K 4.9  --   --  4.9 5.0   CL 91*  --   --  91* 91*   CO2 22  --   --  22 24   BUN 12  --   --  22* 23*   CREATININE <0.5*  --   --  <0.5* <0.5*   CALCIUM 8.3  --   --  8.5 8.4    < > = values in this interval not displayed.       Recent Labs     01/06/24 0422   AST 60*   ALT 23   BILIDIR 6.5*   BILITOT 14.2*   ALKPHOS 173*       Recent Labs     01/06/24  1553   INR 2.75*       No results for input(s): \"CKTOTAL\", \"TROPHS\" in the last 72 hours.    Urinalysis:    No results found for: \"NITRU\", \"WBCUA\", \"BACTERIA\", \"RBCUA\", \"BLOODU\", \"SPECGRAV\", \"GLUCOSEU\"    Radiology:  US GUIDED PARACENTESIS    (Results Pending)       IP CONSULT TO GI  IP CONSULT TO NEPHROLOGY  IP CONSULT TO DIETITIAN    Assessment/Plan:    Active Hospital Problems    Diagnosis     Dizziness [R42]     Hyponatremia [E87.1]      Acute anemia  Transfused 1 U PRBC 1/6/23  Repeat CBC improved  On Protonix  Dizziness  Given albumin  Resumed Lasix  Holding Aldactone  Hyponatremia  Resumed Lasix  Holding Aldactone  Renal input

## 2024-01-08 NOTE — PROGRESS NOTES
Nutrition Education    Educated on Low Na diet d/t cirrhosis.  Also encouraged overall healthy eating habits with written materials provided.  Learners: Patient  Readiness: Eager  Method: Explanation and Handout  Response: Verbalizes Understanding  Contact name and number provided.    Lizet Vinson RD, LD  Contact Number: [phone]

## 2024-01-09 ENCOUNTER — APPOINTMENT (OUTPATIENT)
Dept: INTERVENTIONAL RADIOLOGY/VASCULAR | Age: 39
DRG: 433 | End: 2024-01-09
Attending: FAMILY MEDICINE
Payer: COMMERCIAL

## 2024-01-09 LAB
ALBUMIN SERPL-MCNC: 3.3 G/DL (ref 3.4–5)
ANION GAP SERPL CALCULATED.3IONS-SCNC: 10 MMOL/L (ref 3–16)
ANION GAP SERPL CALCULATED.3IONS-SCNC: 12 MMOL/L (ref 3–16)
BASOPHILS # BLD: 0.1 K/UL (ref 0–0.2)
BASOPHILS NFR BLD: 0.9 %
BUN SERPL-MCNC: 23 MG/DL (ref 7–20)
BUN SERPL-MCNC: 25 MG/DL (ref 7–20)
CALCIUM SERPL-MCNC: 8.7 MG/DL (ref 8.3–10.6)
CALCIUM SERPL-MCNC: 8.9 MG/DL (ref 8.3–10.6)
CHLORIDE SERPL-SCNC: 90 MMOL/L (ref 99–110)
CHLORIDE SERPL-SCNC: 92 MMOL/L (ref 99–110)
CO2 SERPL-SCNC: 23 MMOL/L (ref 21–32)
CO2 SERPL-SCNC: 24 MMOL/L (ref 21–32)
CREAT SERPL-MCNC: <0.5 MG/DL (ref 0.6–1.1)
CREAT SERPL-MCNC: <0.5 MG/DL (ref 0.6–1.1)
DEPRECATED RDW RBC AUTO: 18.2 % (ref 12.4–15.4)
EOSINOPHIL # BLD: 0.2 K/UL (ref 0–0.6)
EOSINOPHIL NFR BLD: 2.1 %
GFR SERPLBLD CREATININE-BSD FMLA CKD-EPI: >60 ML/MIN/{1.73_M2}
GFR SERPLBLD CREATININE-BSD FMLA CKD-EPI: >60 ML/MIN/{1.73_M2}
GLUCOSE SERPL-MCNC: 82 MG/DL (ref 70–99)
GLUCOSE SERPL-MCNC: 97 MG/DL (ref 70–99)
HCT VFR BLD AUTO: 20.8 % (ref 36–48)
HGB BLD-MCNC: 7.6 G/DL (ref 12–16)
LYMPHOCYTES # BLD: 1 K/UL (ref 1–5.1)
LYMPHOCYTES NFR BLD: 11.3 %
MAGNESIUM SERPL-MCNC: 2 MG/DL (ref 1.8–2.4)
MCH RBC QN AUTO: 39 PG (ref 26–34)
MCHC RBC AUTO-ENTMCNC: 36.5 G/DL (ref 31–36)
MCV RBC AUTO: 106.9 FL (ref 80–100)
MONOCYTES # BLD: 0.8 K/UL (ref 0–1.3)
MONOCYTES NFR BLD: 9.3 %
NEUTROPHILS # BLD: 6.7 K/UL (ref 1.7–7.7)
NEUTROPHILS NFR BLD: 76.4 %
PHOSPHATE SERPL-MCNC: 3.4 MG/DL (ref 2.5–4.9)
PLATELET # BLD AUTO: 68 K/UL (ref 135–450)
PMV BLD AUTO: 6.1 FL (ref 5–10.5)
POTASSIUM SERPL-SCNC: 4.7 MMOL/L (ref 3.5–5.1)
POTASSIUM SERPL-SCNC: 5.3 MMOL/L (ref 3.5–5.1)
RBC # BLD AUTO: 1.95 M/UL (ref 4–5.2)
SODIUM SERPL-SCNC: 125 MMOL/L (ref 136–145)
SODIUM SERPL-SCNC: 125 MMOL/L (ref 136–145)
SODIUM SERPL-SCNC: 126 MMOL/L (ref 136–145)
WBC # BLD AUTO: 8.8 K/UL (ref 4–11)

## 2024-01-09 PROCEDURE — 84295 ASSAY OF SERUM SODIUM: CPT

## 2024-01-09 PROCEDURE — 6370000000 HC RX 637 (ALT 250 FOR IP): Performed by: INTERNAL MEDICINE

## 2024-01-09 PROCEDURE — 1200000000 HC SEMI PRIVATE

## 2024-01-09 PROCEDURE — 94760 N-INVAS EAR/PLS OXIMETRY 1: CPT

## 2024-01-09 PROCEDURE — 0W9G3ZX DRAINAGE OF PERITONEAL CAVITY, PERCUTANEOUS APPROACH, DIAGNOSTIC: ICD-10-PCS | Performed by: RADIOLOGY

## 2024-01-09 PROCEDURE — 6370000000 HC RX 637 (ALT 250 FOR IP): Performed by: NURSE PRACTITIONER

## 2024-01-09 PROCEDURE — P9047 ALBUMIN (HUMAN), 25%, 50ML: HCPCS | Performed by: INTERNAL MEDICINE

## 2024-01-09 PROCEDURE — C1729 CATH, DRAINAGE: HCPCS

## 2024-01-09 PROCEDURE — 36415 COLL VENOUS BLD VENIPUNCTURE: CPT

## 2024-01-09 PROCEDURE — 2580000003 HC RX 258: Performed by: NURSE PRACTITIONER

## 2024-01-09 PROCEDURE — 85025 COMPLETE CBC W/AUTO DIFF WBC: CPT

## 2024-01-09 PROCEDURE — 49083 ABD PARACENTESIS W/IMAGING: CPT

## 2024-01-09 PROCEDURE — 6360000002 HC RX W HCPCS: Performed by: INTERNAL MEDICINE

## 2024-01-09 PROCEDURE — 80069 RENAL FUNCTION PANEL: CPT

## 2024-01-09 PROCEDURE — 83735 ASSAY OF MAGNESIUM: CPT

## 2024-01-09 RX ORDER — SPIRONOLACTONE 25 MG/1
50 TABLET ORAL DAILY
Status: DISCONTINUED | OUTPATIENT
Start: 2024-01-09 | End: 2024-01-09

## 2024-01-09 RX ORDER — FUROSEMIDE 40 MG/1
40 TABLET ORAL 2 TIMES DAILY
Status: DISCONTINUED | OUTPATIENT
Start: 2024-01-09 | End: 2024-01-11

## 2024-01-09 RX ADMIN — FUROSEMIDE 40 MG: 40 TABLET ORAL at 15:53

## 2024-01-09 RX ADMIN — ALBUMIN (HUMAN) 25 G: 0.25 INJECTION, SOLUTION INTRAVENOUS at 08:36

## 2024-01-09 RX ADMIN — Medication 15 G: at 08:29

## 2024-01-09 RX ADMIN — Medication 10 ML: at 21:52

## 2024-01-09 RX ADMIN — PANTOPRAZOLE SODIUM 40 MG: 40 TABLET, DELAYED RELEASE ORAL at 08:29

## 2024-01-09 RX ADMIN — FUROSEMIDE 20 MG: 20 TABLET ORAL at 08:29

## 2024-01-09 RX ADMIN — Medication 10 ML: at 08:36

## 2024-01-09 RX ADMIN — TRAMADOL HYDROCHLORIDE 50 MG: 50 TABLET ORAL at 17:03

## 2024-01-09 RX ADMIN — FOLIC ACID 1 MG: 1 TABLET ORAL at 08:29

## 2024-01-09 RX ADMIN — ALBUMIN (HUMAN) 25 G: 0.25 INJECTION, SOLUTION INTRAVENOUS at 15:53

## 2024-01-09 RX ADMIN — Medication 15 G: at 21:52

## 2024-01-09 ASSESSMENT — PAIN SCALES - GENERAL
PAINLEVEL_OUTOF10: 0
PAINLEVEL_OUTOF10: 0
PAINLEVEL_OUTOF10: 8
PAINLEVEL_OUTOF10: 0
PAINLEVEL_OUTOF10: 3
PAINLEVEL_OUTOF10: 0

## 2024-01-09 NOTE — ACP (ADVANCE CARE PLANNING)
Advanced Care Planning Note.    Purpose of Encounter: Advanced care planning in light of alcoholic cirrhosis  Parties In Attendance: Patient, mother  Decisional Capacity: Yes  Subjective: Patient has abdominal distension  Objective: Cr < 0.5  Goals of Care Determination: Patient wants full support (CPR, vent, surgery, HD, trach, PEG)  Plan:  Renal and GI consults.  PRBC.  Paracentesis  Code Status: Full code   Time spent on Advanced care Plannin minutes  Advanced Care Planning Documents: Completed advanced directives on chart, mother is the POA.    Tree Dutta MD  2024 8:38 PM

## 2024-01-09 NOTE — PROGRESS NOTES
MD Tish Altamirano MD Samir Brahmbhatt, MD                  Office: [phone]                 Fax: [phone]         [employer]                          NEPHROLOGY INPATIENT PROGRESS  NOTE:     PATIENT NAME: Angélica Gonzalez  : 1985  MRN: [id number]    Patient Active Problem List   Diagnosis    Dizziness    Hyponatremia       Past Medical History:   has a past medical history of ADHD, Cirrhosis (HCC), ETOH abuse, and Hepatic cirrhosis (HCC).    Past Social History:   reports that she has never smoked. She has never used smokeless tobacco. She reports current alcohol use. She reports that she does not use drugs.      Subjective / interval history / nephrology update / medical decision making:   + Refer to assessment and plan    Patient was seen comfortably sitting up  in bed ,   Had a paracentesis today morning, remove more than 5 L, given albumin    Reported no  active complaints/distress,   Abdominal distention still present  Said leg edema usually better in the morning during daytime and gets better  No shortness of breath,  On room air without hypoxia  Renal labs noted.        Objective:      BP (!) 97/59   Pulse 90   Temp 98 °F (36.7 °C) (Oral)   Resp 20   Ht 1.676 m (5' 6\")   Wt 93.4 kg (206 lb)   SpO2 99%   BMI 33.25 kg/m²     Wt Readings from Last 3 Encounters:   24 93.4 kg (206 lb)   11/15/23 83.9 kg (184 lb 14.4 oz)       BP Readings from Last 3 Encounters:   24 (!) 97/59   24 (!) 96/56   23 (!) 97/54     General: Awake, no acute distress   HENT: Atraumatic, normocephalic   Eyes: Normal conjunctiva, + icteral sclera.   Neck: Supple, JVD not visible.   CVS:  Heart sounds s1 s2+  RS: Normal respiratory effort, Breat sound: diminished at bases.     Abd:   , bowel sounds are normal, +  distension    Skin: icteral skin  CNS: Awake Oriented x3, No focal.   Extremities/MSK:  + Edema    Labs  Hemoglobin   Date Value Ref Range Status

## 2024-01-09 NOTE — PLAN OF CARE
Problem: Safety - Adult  Goal: Free from fall injury  1/9/2024 0828 by Radha Jim RN  Outcome: Progressing  1/8/2024 1846 by Lilia Claire RN  Outcome: Progressing     Problem: ABCDS Injury Assessment  Goal: Absence of physical injury  1/9/2024 0828 by Radha Jim RN  Outcome: Progressing  1/8/2024 1846 by Lilia Claire RN  Outcome: Progressing     Problem: Pain  Goal: Verbalizes/displays adequate comfort level or baseline comfort level  1/9/2024 0828 by Radha Jim RN  Outcome: Progressing  1/8/2024 1846 by Lilia Claire RN  Outcome: Progressing

## 2024-01-09 NOTE — PROGRESS NOTES
6600ml of fluid removed  Spoke to patients RN and advised her the amount of fluid removed and that patient was returning to the floor.

## 2024-01-09 NOTE — PROGRESS NOTES
Assessment complete. VSS. Patient resting in bed. Respirations even and easy. Call light in reach. Fall precautions in place. No needs expressed at this time.

## 2024-01-09 NOTE — PROGRESS NOTES
Gastroenterology Progress Note      Angélica Gonzalez is a 38 y.o. female patient.  No diagnosis found.    SUBJECTIVE: Abdomen feels better after paracentesis today.    Physical    VITALS:  BP (!) 97/59   Pulse 90   Temp 98 °F (36.7 °C) (Oral)   Resp 20   Ht 1.676 m (5' 6\")   Wt 93.4 kg (206 lb)   SpO2 99%   BMI 33.25 kg/m²   TEMPERATURE:  Current - Temp: 98 °F (36.7 °C); Max - Temp  Av.8 °F (36.6 °C)  Min: 97.6 °F (36.4 °C)  Max: 98 °F (36.7 °C)    Constitutional: Alert and oriented x 4. No acute distress.   Respiratory: Respirations nonlabored, no crepitus  GI: Abdomen nondistended, soft, and nontender.    Neurological: No focal deficits noted. No asterixis.      Data    Data Review:    Recent Labs     24  0628 24  0714 24  0611   WBC 11.2* 9.1 8.8   HGB 8.4* 8.2* 7.6*   HCT 24.3* 22.6* 20.8*   .8* 106.9* 106.9*   PLT 81* 73* 68*     Recent Labs     24  0714 24  2106 24  0611 24  1133   * 120* 125* 126*   K 5.0  --  5.3* 4.7   CL 91*  --  92* 90*   CO2 24  --  23 24   PHOS  --   --  3.4  --    BUN 23*  --  25* 23*   CREATININE <0.5*  --  <0.5* <0.5*     No results for input(s): \"AST\", \"ALT\", \"ALB\", \"BILIDIR\", \"BILITOT\", \"ALKPHOS\" in the last 72 hours.  No results for input(s): \"LIPASE\", \"AMYLASE\" in the last 72 hours.  Recent Labs     24  1553   PROTIME 28.9*   INR 2.75*     No results for input(s): \"PTT\" in the last 72 hours.        ASSESSMENT / PLAN      Alcoholic cirrhosis.  MELD 33.  Has been followed with Dr. Burns but more recently for referred to  and she has seen social work there.  Has an appointment with transplant center later this month.  -Alcohol abstinence    Ascites -had paracentesis with 2024 and was negative for SBP.  S/p paracentesis with 6.6 L fluid removed today.  -Diuretics per nephrology in setting of hyponatremia  -Receiving IV albumin.    Hyponatremia -nephrology following.    Esophageal varices -on

## 2024-01-10 LAB
ALBUMIN SERPL-MCNC: 3.7 G/DL (ref 3.4–5)
ALBUMIN SERPL-MCNC: 3.8 G/DL (ref 3.4–5)
ANION GAP SERPL CALCULATED.3IONS-SCNC: 11 MMOL/L (ref 3–16)
ANION GAP SERPL CALCULATED.3IONS-SCNC: 12 MMOL/L (ref 3–16)
BASOPHILS # BLD: 0.1 K/UL (ref 0–0.2)
BASOPHILS NFR BLD: 1.1 %
BUN SERPL-MCNC: 25 MG/DL (ref 7–20)
BUN SERPL-MCNC: 29 MG/DL (ref 7–20)
CALCIUM SERPL-MCNC: 8.6 MG/DL (ref 8.3–10.6)
CALCIUM SERPL-MCNC: 8.9 MG/DL (ref 8.3–10.6)
CHLORIDE SERPL-SCNC: 89 MMOL/L (ref 99–110)
CHLORIDE SERPL-SCNC: 91 MMOL/L (ref 99–110)
CO2 SERPL-SCNC: 22 MMOL/L (ref 21–32)
CO2 SERPL-SCNC: 24 MMOL/L (ref 21–32)
CREAT SERPL-MCNC: <0.5 MG/DL (ref 0.6–1.1)
CREAT SERPL-MCNC: <0.5 MG/DL (ref 0.6–1.1)
DEPRECATED RDW RBC AUTO: 18.6 % (ref 12.4–15.4)
EOSINOPHIL # BLD: 0.1 K/UL (ref 0–0.6)
EOSINOPHIL NFR BLD: 1.8 %
GFR SERPLBLD CREATININE-BSD FMLA CKD-EPI: >60 ML/MIN/{1.73_M2}
GFR SERPLBLD CREATININE-BSD FMLA CKD-EPI: >60 ML/MIN/{1.73_M2}
GLUCOSE SERPL-MCNC: 101 MG/DL (ref 70–99)
GLUCOSE SERPL-MCNC: 116 MG/DL (ref 70–99)
HCT VFR BLD AUTO: 20.4 % (ref 36–48)
HGB BLD-MCNC: 7.5 G/DL (ref 12–16)
LYMPHOCYTES # BLD: 1.1 K/UL (ref 1–5.1)
LYMPHOCYTES NFR BLD: 14.1 %
MAGNESIUM SERPL-MCNC: 2.1 MG/DL (ref 1.8–2.4)
MCH RBC QN AUTO: 39.2 PG (ref 26–34)
MCHC RBC AUTO-ENTMCNC: 36.8 G/DL (ref 31–36)
MCV RBC AUTO: 106.4 FL (ref 80–100)
MONOCYTES # BLD: 0.8 K/UL (ref 0–1.3)
MONOCYTES NFR BLD: 9.9 %
NEUTROPHILS # BLD: 5.6 K/UL (ref 1.7–7.7)
NEUTROPHILS NFR BLD: 73.1 %
PHOSPHATE SERPL-MCNC: 3.1 MG/DL (ref 2.5–4.9)
PHOSPHATE SERPL-MCNC: 3.2 MG/DL (ref 2.5–4.9)
PLATELET # BLD AUTO: 67 K/UL (ref 135–450)
PMV BLD AUTO: 6 FL (ref 5–10.5)
POTASSIUM SERPL-SCNC: 4.1 MMOL/L (ref 3.5–5.1)
POTASSIUM SERPL-SCNC: 4.4 MMOL/L (ref 3.5–5.1)
RBC # BLD AUTO: 1.92 M/UL (ref 4–5.2)
SODIUM SERPL-SCNC: 123 MMOL/L (ref 136–145)
SODIUM SERPL-SCNC: 126 MMOL/L (ref 136–145)
SODIUM SERPL-SCNC: 126 MMOL/L (ref 136–145)
WBC # BLD AUTO: 7.6 K/UL (ref 4–11)

## 2024-01-10 PROCEDURE — 6370000000 HC RX 637 (ALT 250 FOR IP): Performed by: NURSE PRACTITIONER

## 2024-01-10 PROCEDURE — 36415 COLL VENOUS BLD VENIPUNCTURE: CPT

## 2024-01-10 PROCEDURE — 6370000000 HC RX 637 (ALT 250 FOR IP): Performed by: INTERNAL MEDICINE

## 2024-01-10 PROCEDURE — 80069 RENAL FUNCTION PANEL: CPT

## 2024-01-10 PROCEDURE — 1200000000 HC SEMI PRIVATE

## 2024-01-10 PROCEDURE — 84295 ASSAY OF SERUM SODIUM: CPT

## 2024-01-10 PROCEDURE — 83735 ASSAY OF MAGNESIUM: CPT

## 2024-01-10 PROCEDURE — 85025 COMPLETE CBC W/AUTO DIFF WBC: CPT

## 2024-01-10 PROCEDURE — 2580000003 HC RX 258: Performed by: NURSE PRACTITIONER

## 2024-01-10 RX ORDER — SPIRONOLACTONE 25 MG/1
25 TABLET ORAL DAILY
Status: DISCONTINUED | OUTPATIENT
Start: 2024-01-10 | End: 2024-01-11

## 2024-01-10 RX ADMIN — Medication 15 G: at 09:48

## 2024-01-10 RX ADMIN — Medication 10 ML: at 20:16

## 2024-01-10 RX ADMIN — SPIRONOLACTONE 25 MG: 25 TABLET ORAL at 13:49

## 2024-01-10 RX ADMIN — Medication 15 G: at 20:16

## 2024-01-10 RX ADMIN — FOLIC ACID 1 MG: 1 TABLET ORAL at 09:48

## 2024-01-10 RX ADMIN — FUROSEMIDE 40 MG: 40 TABLET ORAL at 09:48

## 2024-01-10 RX ADMIN — TRAMADOL HYDROCHLORIDE 50 MG: 50 TABLET ORAL at 23:18

## 2024-01-10 RX ADMIN — Medication 10 ML: at 09:50

## 2024-01-10 RX ADMIN — PANTOPRAZOLE SODIUM 40 MG: 40 TABLET, DELAYED RELEASE ORAL at 05:19

## 2024-01-10 RX ADMIN — FUROSEMIDE 40 MG: 40 TABLET ORAL at 16:54

## 2024-01-10 RX ADMIN — DEXTROAMPHETAMINE SACCHARATE, AMPHETAMINE ASPARTATE MONOHYDRATE, DEXTROAMPHETAMINE SULFATE AND AMPHETAMINE SULFATE 20 MG: 2.5; 2.5; 2.5; 2.5 CAPSULE, EXTENDED RELEASE ORAL at 09:48

## 2024-01-10 ASSESSMENT — PAIN SCALES - GENERAL
PAINLEVEL_OUTOF10: 5
PAINLEVEL_OUTOF10: 0

## 2024-01-10 NOTE — CARE COORDINATION
The SW called and spoke with the patient's mother Trinh Gonzalez phone [phone]  regarding her inquiry regarding Rent, Utility and Financial Assistance for the patient. The patient's mother Trinh is agreeable to having information emailed to her at [email or web address]. The SW emailed Rent, Utility and Financial Assistance programs in the [facility] area.     Electronically signed by MARYANN Rai on 1/10/2024 at 3:39 PM

## 2024-01-10 NOTE — PROGRESS NOTES
Hospitalist Progress Note      PCP: No primary care provider on file.    Date of Admission: 1/6/2024    Chief Complaint: Dizziness    Hospital Course: 37 yo F with alcoholic cirrhosis, esophageal varices, portal hypertensive gastropathy with gastritis, ADHD came to ER with dizziness.  Had 7.5 L paracentesis on 1/5/24.  Admitted as inpatient for dizziness, acute anemia with acute hyponatremia.  Given Albumin and transfused 1 U PRBC.  Followed by GI and Renal.  Diuretics held.  Lasix resumed on 1/7/24. S/P Paracentesis of 6.6 L on 1/9.  Aldactone resumed on 1/10.    Subjective:  Patient denies dizziness.  No CP, SOB, HA or abdominal pain.   Beginning to get more distended after paracentesis.      Medications:  Reviewed    Infusion Medications    sodium chloride      sodium chloride       Scheduled Medications    spironolactone  25 mg Oral Daily    furosemide  40 mg Oral BID    amphetamine-dextroamphetamine  20 mg Oral QAM    folic acid  1 mg Oral Daily    pantoprazole  40 mg Oral QAM AC    sodium chloride flush  5-40 mL IntraVENous 2 times per day    urea  15 g Oral BID     PRN Meds: traMADol, sodium chloride flush, sodium chloride, potassium chloride **OR** potassium alternative oral replacement **OR** potassium chloride, magnesium sulfate, ondansetron **OR** ondansetron, polyethylene glycol, acetaminophen **OR** acetaminophen, sodium chloride      Intake/Output Summary (Last 24 hours) at 1/10/2024 1345  Last data filed at 1/10/2024 0501  Gross per 24 hour   Intake 360 ml   Output --   Net 360 ml         Physical Exam Performed:    /61   Pulse 89   Temp 97.9 °F (36.6 °C) (Oral)   Resp 16   Ht 1.676 m (5' 6\")   Wt 93.4 kg (206 lb)   SpO2 99%   BMI 33.25 kg/m²     General appearance: No apparent distress, appears stated age and cooperative.    HEENT: Pupils equal, round, and reactive to light. Scleral icterus  Neck: Supple, with full range of motion. No jugular venous distention. Trachea  albumin  Resumed Lasix  Resume Aldactone  Hyponatremia  Resumed Lasix  Resume Aldactone  Renal input appreciated  Continue Urea  Awaiting sodium getting to 130 per Renal  Alcoholic cirrhosis with ascites  On Coreg for EV as OP  Resumed Lasix  Resume Aldactone   Given Albumin IV  S/P Paracentesis 1/9/24     DVT Prophylaxis: SCD  Diet: ADULT DIET; Regular; Low Sodium (2 gm); 1500 ml  Code Status: Full Code  PT/OT Eval Status: Not needed    Dispo - Home    Discussed with patient and nursing.    Discussed with Dr Carr (Renal).  Not ready for DC to home.    Tree Dutta MD

## 2024-01-10 NOTE — PROGRESS NOTES
Hospitalist Progress Note      PCP: No primary care provider on file.    Date of Admission: 1/6/2024    Chief Complaint: Dizziness    Hospital Course: 39 yo F with alcoholic cirrhosis, esophageal varices, portal hypertensive gastropathy with gastritis, ADHD came to ER with dizziness.  Had 7.5 L paracentesis on 1/5/24.  Admitted as inpatient for dizziness, acute anemia with acute hyponatremia.  Given Albumin and transfused 1 U PRBC.  Followed by GI and Renal.  Diuretics held.  Lasix resumed on 1/7/24. S/P Paracentesis of 6.6 L on 1/9.    Subjective:  Patient denies dizziness.  No CP, SOB, HA or abdominal pain.   Less distended after paracentesis.      Medications:  Reviewed    Infusion Medications    sodium chloride      sodium chloride       Scheduled Medications    furosemide  40 mg Oral BID    amphetamine-dextroamphetamine  20 mg Oral QAM    folic acid  1 mg Oral Daily    pantoprazole  40 mg Oral QAM AC    sodium chloride flush  5-40 mL IntraVENous 2 times per day    urea  15 g Oral BID     PRN Meds: traMADol, sodium chloride flush, sodium chloride, potassium chloride **OR** potassium alternative oral replacement **OR** potassium chloride, magnesium sulfate, ondansetron **OR** ondansetron, polyethylene glycol, acetaminophen **OR** acetaminophen, sodium chloride      Intake/Output Summary (Last 24 hours) at 1/9/2024 2109  Last data filed at 1/9/2024 0827  Gross per 24 hour   Intake 30 ml   Output --   Net 30 ml         Physical Exam Performed:    BP (!) 105/42   Pulse 90   Temp 97.9 °F (36.6 °C) (Oral)   Resp 16   Ht 1.676 m (5' 6\")   Wt 93.4 kg (206 lb)   SpO2 96%   BMI 33.25 kg/m²     General appearance: No apparent distress, appears stated age and cooperative.    HEENT: Pupils equal, round, and reactive to light. Scleral icterus  Neck: Supple, with full range of motion. No jugular venous distention. Trachea midline.  Respiratory:  Normal respiratory effort. Clear to auscultation, bilaterally without

## 2024-01-10 NOTE — PROGRESS NOTES
Patient's mother Trinh Gonzalez phone # [phone] stopped into this director's office seeking guidance for resource availability for her daughter specifically financial hardship of liver transplant. Will reach out to .

## 2024-01-10 NOTE — PROGRESS NOTES
MD Tish Altamirano MD Samir Brahmbhatt, MD                  Office: [phone]                 Fax: [phone]         [employer]                          NEPHROLOGY INPATIENT PROGRESS  NOTE:     PATIENT NAME: Angélica Gonzalez  : 1985  MRN: [id number]    Patient Active Problem List   Diagnosis    Dizziness    Hyponatremia       Past Medical History:   has a past medical history of ADHD, Cirrhosis (HCC), ETOH abuse, and Hepatic cirrhosis (HCC).    Past Social History:   reports that she has never smoked. She has never used smokeless tobacco. She reports current alcohol use. She reports that she does not use drugs.      Subjective / interval history / nephrology update / medical decision making:   + Refer to assessment and plan    Patient was seen comfortably resting in bed     On 2024: Had a paracentesis , removed more than 5 L, given albumin    Reported no  active complaints/distress,     Abdominal distention still present but better   Said leg edema usually better in the morning during daytime and gets better  No shortness of breath,    On room air without hypoxia  Renal labs noted.        Objective:      /61   Pulse 89   Temp 97.9 °F (36.6 °C) (Oral)   Resp 16   Ht 1.676 m (5' 6\")   Wt 93.4 kg (206 lb)   SpO2 99%   BMI 33.25 kg/m²     Wt Readings from Last 3 Encounters:   24 93.4 kg (206 lb)   11/15/23 83.9 kg (184 lb 14.4 oz)       BP Readings from Last 3 Encounters:   01/10/24 105/61   24 (!) 96/56   23 (!) 97/54     General: Awake, no acute distress   HENT: Atraumatic, normocephalic   Eyes: Normal conjunctiva, + icteral sclera.   Neck: Supple, JVD not visible.   CVS:  Heart sounds s1 s2+  RS: Normal respiratory effort, Breat sound: diminished at bases.     Abd:   , bowel sounds are normal, +  distension    Skin: icteral skin  CNS: Awake Oriented x3, No focal.   Extremities/MSK:  + Edema    Labs  Hemoglobin   Date Value Ref Range Status

## 2024-01-11 LAB
ALBUMIN SERPL-MCNC: 3.6 G/DL (ref 3.4–5)
ALBUMIN SERPL-MCNC: 3.7 G/DL (ref 3.4–5)
ANION GAP SERPL CALCULATED.3IONS-SCNC: 13 MMOL/L (ref 3–16)
ANION GAP SERPL CALCULATED.3IONS-SCNC: 9 MMOL/L (ref 3–16)
ANISOCYTOSIS BLD QL SMEAR: ABNORMAL
BASOPHILS # BLD: 0 K/UL (ref 0–0.2)
BASOPHILS NFR BLD: 0 %
BUN SERPL-MCNC: 26 MG/DL (ref 7–20)
BUN SERPL-MCNC: 27 MG/DL (ref 7–20)
BURR CELLS BLD QL SMEAR: ABNORMAL
CALCIUM SERPL-MCNC: 8.7 MG/DL (ref 8.3–10.6)
CALCIUM SERPL-MCNC: 9 MG/DL (ref 8.3–10.6)
CHLORIDE SERPL-SCNC: 91 MMOL/L (ref 99–110)
CHLORIDE SERPL-SCNC: 92 MMOL/L (ref 99–110)
CO2 SERPL-SCNC: 23 MMOL/L (ref 21–32)
CO2 SERPL-SCNC: 25 MMOL/L (ref 21–32)
CREAT SERPL-MCNC: <0.5 MG/DL (ref 0.6–1.1)
CREAT SERPL-MCNC: <0.5 MG/DL (ref 0.6–1.1)
DEPRECATED RDW RBC AUTO: 17.8 % (ref 12.4–15.4)
EOSINOPHIL # BLD: 0.1 K/UL (ref 0–0.6)
EOSINOPHIL NFR BLD: 1 %
GFR SERPLBLD CREATININE-BSD FMLA CKD-EPI: >60 ML/MIN/{1.73_M2}
GFR SERPLBLD CREATININE-BSD FMLA CKD-EPI: >60 ML/MIN/{1.73_M2}
GLUCOSE SERPL-MCNC: 120 MG/DL (ref 70–99)
GLUCOSE SERPL-MCNC: 98 MG/DL (ref 70–99)
HCT VFR BLD AUTO: 21.7 % (ref 36–48)
HGB BLD-MCNC: 7.7 G/DL (ref 12–16)
LYMPHOCYTES # BLD: 1.1 K/UL (ref 1–5.1)
LYMPHOCYTES NFR BLD: 12 %
MAGNESIUM SERPL-MCNC: 2.1 MG/DL (ref 1.8–2.4)
MCH RBC QN AUTO: 38.2 PG (ref 26–34)
MCHC RBC AUTO-ENTMCNC: 35.6 G/DL (ref 31–36)
MCV RBC AUTO: 107.4 FL (ref 80–100)
MONOCYTES # BLD: 0.8 K/UL (ref 0–1.3)
MONOCYTES NFR BLD: 9 %
NEUTROPHILS # BLD: 7.3 K/UL (ref 1.7–7.7)
NEUTROPHILS NFR BLD: 78 %
OVALOCYTES BLD QL SMEAR: ABNORMAL
PHOSPHATE SERPL-MCNC: 3.4 MG/DL (ref 2.5–4.9)
PHOSPHATE SERPL-MCNC: 3.5 MG/DL (ref 2.5–4.9)
PLATELET # BLD AUTO: 70 K/UL (ref 135–450)
PLATELET BLD QL SMEAR: ABNORMAL
PMV BLD AUTO: 6.2 FL (ref 5–10.5)
POIKILOCYTOSIS BLD QL SMEAR: ABNORMAL
POTASSIUM SERPL-SCNC: 4.1 MMOL/L (ref 3.5–5.1)
POTASSIUM SERPL-SCNC: 4.3 MMOL/L (ref 3.5–5.1)
RBC # BLD AUTO: 2.02 M/UL (ref 4–5.2)
SCHISTOCYTES BLD QL SMEAR: ABNORMAL
SLIDE REVIEW: ABNORMAL
SODIUM SERPL-SCNC: 126 MMOL/L (ref 136–145)
SODIUM SERPL-SCNC: 127 MMOL/L (ref 136–145)
TARGETS BLD QL SMEAR: ABNORMAL
WBC # BLD AUTO: 9.4 K/UL (ref 4–11)

## 2024-01-11 PROCEDURE — 6360000002 HC RX W HCPCS: Performed by: INTERNAL MEDICINE

## 2024-01-11 PROCEDURE — 80069 RENAL FUNCTION PANEL: CPT

## 2024-01-11 PROCEDURE — 94760 N-INVAS EAR/PLS OXIMETRY 1: CPT

## 2024-01-11 PROCEDURE — 6370000000 HC RX 637 (ALT 250 FOR IP): Performed by: NURSE PRACTITIONER

## 2024-01-11 PROCEDURE — 36415 COLL VENOUS BLD VENIPUNCTURE: CPT

## 2024-01-11 PROCEDURE — 83735 ASSAY OF MAGNESIUM: CPT

## 2024-01-11 PROCEDURE — 2580000003 HC RX 258: Performed by: NURSE PRACTITIONER

## 2024-01-11 PROCEDURE — 6370000000 HC RX 637 (ALT 250 FOR IP): Performed by: INTERNAL MEDICINE

## 2024-01-11 PROCEDURE — 6360000002 HC RX W HCPCS: Performed by: NURSE PRACTITIONER

## 2024-01-11 PROCEDURE — 85025 COMPLETE CBC W/AUTO DIFF WBC: CPT

## 2024-01-11 PROCEDURE — 1200000000 HC SEMI PRIVATE

## 2024-01-11 RX ORDER — FUROSEMIDE 10 MG/ML
40 INJECTION INTRAMUSCULAR; INTRAVENOUS 2 TIMES DAILY
Status: DISCONTINUED | OUTPATIENT
Start: 2024-01-11 | End: 2024-01-12

## 2024-01-11 RX ORDER — SPIRONOLACTONE 25 MG/1
50 TABLET ORAL DAILY
Status: DISCONTINUED | OUTPATIENT
Start: 2024-01-11 | End: 2024-01-13 | Stop reason: HOSPADM

## 2024-01-11 RX ADMIN — ONDANSETRON 4 MG: 2 INJECTION INTRAMUSCULAR; INTRAVENOUS at 22:09

## 2024-01-11 RX ADMIN — FUROSEMIDE 40 MG: 10 INJECTION, SOLUTION INTRAMUSCULAR; INTRAVENOUS at 14:28

## 2024-01-11 RX ADMIN — SPIRONOLACTONE 50 MG: 25 TABLET ORAL at 14:28

## 2024-01-11 RX ADMIN — Medication 15 G: at 08:42

## 2024-01-11 RX ADMIN — FUROSEMIDE 40 MG: 10 INJECTION, SOLUTION INTRAMUSCULAR; INTRAVENOUS at 18:17

## 2024-01-11 RX ADMIN — FUROSEMIDE 40 MG: 40 TABLET ORAL at 08:43

## 2024-01-11 RX ADMIN — TRAMADOL HYDROCHLORIDE 50 MG: 50 TABLET ORAL at 16:32

## 2024-01-11 RX ADMIN — Medication 10 ML: at 22:09

## 2024-01-11 RX ADMIN — SPIRONOLACTONE 25 MG: 25 TABLET ORAL at 08:43

## 2024-01-11 RX ADMIN — PANTOPRAZOLE SODIUM 40 MG: 40 TABLET, DELAYED RELEASE ORAL at 05:12

## 2024-01-11 RX ADMIN — Medication 15 G: at 22:09

## 2024-01-11 RX ADMIN — Medication 10 ML: at 08:48

## 2024-01-11 RX ADMIN — FOLIC ACID 1 MG: 1 TABLET ORAL at 08:42

## 2024-01-11 ASSESSMENT — PAIN SCALES - GENERAL
PAINLEVEL_OUTOF10: 0
PAINLEVEL_OUTOF10: 0
PAINLEVEL_OUTOF10: 6
PAINLEVEL_OUTOF10: 0

## 2024-01-11 NOTE — PROGRESS NOTES
MD Tish Altamirano MD Samir Brahmbhatt, MD                  Office: [phone]                 Fax: [phone]         [employer]                          NEPHROLOGY INPATIENT PROGRESS  NOTE:     PATIENT NAME: Angélica Gonzalez  : 1985  MRN: [id number]    Patient Active Problem List   Diagnosis    Dizziness    Hyponatremia       Past Medical History:   has a past medical history of ADHD, Cirrhosis (HCC), ETOH abuse, and Hepatic cirrhosis (HCC).    Past Social History:   reports that she has never smoked. She has never used smokeless tobacco. She reports current alcohol use. She reports that she does not use drugs.      Subjective / interval history / nephrology update / medical decision making:   + Refer to assessment and plan    Patient was seen comfortably resting in bed again      On 2024: Had a paracentesis , removed more than 5 L, given albumin    Reported no  active complaints/distress,   Except mild \"bloating     Abdominal distention still present but better   Said leg edema usually better in the morning during daytime and gets better  No shortness of breath,    On room air without hypoxia  Renal labs noted.        Objective:      BP (!) 105/52   Pulse 93   Temp 97.7 °F (36.5 °C) (Oral)   Resp 14   Ht 1.676 m (5' 6\")   Wt 86.2 kg (190 lb)   SpO2 98%   BMI 30.67 kg/m²     Wt Readings from Last 3 Encounters:   24 86.2 kg (190 lb)   11/15/23 83.9 kg (184 lb 14.4 oz)       BP Readings from Last 3 Encounters:   24 (!) 105/52   24 (!) 96/56   23 (!) 97/54     General: Awake, no acute distress   HENT: Atraumatic, normocephalic   Eyes: Normal conjunctiva, + icteral sclera.   Neck: Supple, JVD not visible.   CVS:  Heart sounds s1 s2+  RS: Normal respiratory effort, Breat sound: diminished at bases.     Abd:   , bowel sounds are normal, +  distension    Skin: icteral skin  CNS: Awake Oriented x3, No focal.   Extremities/MSK:  +  gender, with cirrhosis.      Medical decision making- high complexity. Multiple complex health problems.   Discussed with patient and and her mother 01/08/24 and 01/10/24  Discussed with treatment team. Fela Miller MD  too   Thank you for allowing me to participate in this patient's care. Please do not hesitate to contact me for any questions/concerns. We will follow along with you.     Yusef Carr MD  [facility]   Phone: [phone] or [employer]  Fax: [phone]    Severally ill, at risk of impending organ failure needing  higher level of care/monitoring.   Time spent~36minutes that included face-to-face meeting/discussion with patient, patient's family -as available, and treatment team (including primary/referring team and other consultants; included coordination of care with the treatment team; and review of patient's electronic medical records and ordering appropriates tests.

## 2024-01-11 NOTE — PROGRESS NOTES
Hospitalist Progress Note      PCP: No primary care provider on file.    Date of Admission: 1/6/2024    Chief Complaint: Dizziness    Hospital Course: 39 yo F with alcoholic cirrhosis, esophageal varices, portal hypertensive gastropathy with gastritis, ADHD came to ER with dizziness.  Had 7.5 L paracentesis on 1/5/24.  Admitted as inpatient for dizziness, acute anemia with acute hyponatremia.  Given Albumin and transfused 1 U PRBC.  Followed by GI and Renal.  Diuretics held.  Lasix resumed on 1/7/24. S/P Paracentesis of 6.6 L on 1/9.  Aldactone resumed on 1/10.    Subjective:  Patient denies dizziness.  No CP, SOB, HA or abdominal pain.   Beginning to get more distended after paracentesis.      Medications:  Reviewed    Infusion Medications    sodium chloride      sodium chloride       Scheduled Medications    spironolactone  50 mg Oral Daily    furosemide  40 mg IntraVENous BID    amphetamine-dextroamphetamine  20 mg Oral QAM    folic acid  1 mg Oral Daily    pantoprazole  40 mg Oral QAM AC    sodium chloride flush  5-40 mL IntraVENous 2 times per day    urea  15 g Oral BID     PRN Meds: traMADol, sodium chloride flush, sodium chloride, potassium chloride **OR** potassium alternative oral replacement **OR** potassium chloride, magnesium sulfate, ondansetron **OR** ondansetron, polyethylene glycol, acetaminophen **OR** acetaminophen, sodium chloride      Intake/Output Summary (Last 24 hours) at 1/11/2024 1710  Last data filed at 1/10/2024 2317  Gross per 24 hour   Intake 360 ml   Output --   Net 360 ml       Physical Exam Performed:    BP (!) 105/52   Pulse 93   Temp 97.7 °F (36.5 °C) (Oral)   Resp 14   Ht 1.676 m (5' 6\")   Wt 86.2 kg (190 lb)   SpO2 98%   BMI 30.67 kg/m²     General appearance: No apparent distress, appears stated age and cooperative.    HEENT: Pupils equal, round, and reactive to light. Scleral icterus  Neck: Supple, with full range of motion. Trachea midline.  Respiratory:  Normal

## 2024-01-12 ENCOUNTER — APPOINTMENT (OUTPATIENT)
Dept: INTERVENTIONAL RADIOLOGY/VASCULAR | Age: 39
DRG: 433 | End: 2024-01-12
Attending: FAMILY MEDICINE
Payer: COMMERCIAL

## 2024-01-12 LAB
ALBUMIN SERPL-MCNC: 3.6 G/DL (ref 3.4–5)
ALP SERPL-CCNC: 118 U/L (ref 40–129)
ALT SERPL-CCNC: 21 U/L (ref 10–40)
ANION GAP SERPL CALCULATED.3IONS-SCNC: 13 MMOL/L (ref 3–16)
ANION GAP SERPL CALCULATED.3IONS-SCNC: 9 MMOL/L (ref 3–16)
AST SERPL-CCNC: 60 U/L (ref 15–37)
BILIRUB DIRECT SERPL-MCNC: 8.8 MG/DL (ref 0–0.3)
BILIRUB INDIRECT SERPL-MCNC: 18 MG/DL (ref 0–1)
BILIRUB SERPL-MCNC: 26.8 MG/DL (ref 0–1)
BUN SERPL-MCNC: 25 MG/DL (ref 7–20)
BUN SERPL-MCNC: 27 MG/DL (ref 7–20)
CALCIUM SERPL-MCNC: 8.7 MG/DL (ref 8.3–10.6)
CALCIUM SERPL-MCNC: 8.7 MG/DL (ref 8.3–10.6)
CHLORIDE SERPL-SCNC: 90 MMOL/L (ref 99–110)
CHLORIDE SERPL-SCNC: 91 MMOL/L (ref 99–110)
CO2 SERPL-SCNC: 23 MMOL/L (ref 21–32)
CO2 SERPL-SCNC: 25 MMOL/L (ref 21–32)
CREAT SERPL-MCNC: <0.5 MG/DL (ref 0.6–1.1)
CREAT SERPL-MCNC: <0.5 MG/DL (ref 0.6–1.1)
DEPRECATED RDW RBC AUTO: 18.4 % (ref 12.4–15.4)
FERRITIN SERPL IA-MCNC: 553.4 NG/ML (ref 15–150)
FOLATE SERPL-MCNC: 16.26 NG/ML (ref 4.78–24.2)
GFR SERPLBLD CREATININE-BSD FMLA CKD-EPI: >60 ML/MIN/{1.73_M2}
GFR SERPLBLD CREATININE-BSD FMLA CKD-EPI: >60 ML/MIN/{1.73_M2}
GLUCOSE SERPL-MCNC: 101 MG/DL (ref 70–99)
GLUCOSE SERPL-MCNC: 161 MG/DL (ref 70–99)
HCT VFR BLD AUTO: 20.5 % (ref 36–48)
HGB BLD-MCNC: 7.5 G/DL (ref 12–16)
IRON SATN MFR SERPL: NORMAL % (ref 15–50)
IRON SERPL-MCNC: 132 UG/DL (ref 37–145)
MAGNESIUM SERPL-MCNC: 2 MG/DL (ref 1.8–2.4)
MCH RBC QN AUTO: 39.2 PG (ref 26–34)
MCHC RBC AUTO-ENTMCNC: 36.8 G/DL (ref 31–36)
MCV RBC AUTO: 106.5 FL (ref 80–100)
PHOSPHATE SERPL-MCNC: 3.4 MG/DL (ref 2.5–4.9)
PHOSPHATE SERPL-MCNC: 3.6 MG/DL (ref 2.5–4.9)
PLATELET # BLD AUTO: 69 K/UL (ref 135–450)
PMV BLD AUTO: 6.1 FL (ref 5–10.5)
POTASSIUM SERPL-SCNC: 4 MMOL/L (ref 3.5–5.1)
POTASSIUM SERPL-SCNC: 4 MMOL/L (ref 3.5–5.1)
PROT SERPL-MCNC: 5.2 G/DL (ref 6.4–8.2)
RBC # BLD AUTO: 1.92 M/UL (ref 4–5.2)
SODIUM SERPL-SCNC: 124 MMOL/L (ref 136–145)
SODIUM SERPL-SCNC: 124 MMOL/L (ref 136–145)
SODIUM SERPL-SCNC: 127 MMOL/L (ref 136–145)
SODIUM SERPL-SCNC: 129 MMOL/L (ref 136–145)
TIBC SERPL-MCNC: NORMAL UG/DL (ref 260–445)
VIT B12 SERPL-MCNC: 1115 PG/ML (ref 211–911)
WBC # BLD AUTO: 9.2 K/UL (ref 4–11)

## 2024-01-12 PROCEDURE — 80069 RENAL FUNCTION PANEL: CPT

## 2024-01-12 PROCEDURE — 6360000002 HC RX W HCPCS: Performed by: INTERNAL MEDICINE

## 2024-01-12 PROCEDURE — 82746 ASSAY OF FOLIC ACID SERUM: CPT

## 2024-01-12 PROCEDURE — 6370000000 HC RX 637 (ALT 250 FOR IP): Performed by: INTERNAL MEDICINE

## 2024-01-12 PROCEDURE — 84295 ASSAY OF SERUM SODIUM: CPT

## 2024-01-12 PROCEDURE — 0W9G3ZX DRAINAGE OF PERITONEAL CAVITY, PERCUTANEOUS APPROACH, DIAGNOSTIC: ICD-10-PCS | Performed by: RADIOLOGY

## 2024-01-12 PROCEDURE — 2580000003 HC RX 258: Performed by: NURSE PRACTITIONER

## 2024-01-12 PROCEDURE — 82607 VITAMIN B-12: CPT

## 2024-01-12 PROCEDURE — 1200000000 HC SEMI PRIVATE

## 2024-01-12 PROCEDURE — 36415 COLL VENOUS BLD VENIPUNCTURE: CPT

## 2024-01-12 PROCEDURE — 82728 ASSAY OF FERRITIN: CPT

## 2024-01-12 PROCEDURE — 85027 COMPLETE CBC AUTOMATED: CPT

## 2024-01-12 PROCEDURE — C1729 CATH, DRAINAGE: HCPCS

## 2024-01-12 PROCEDURE — 80076 HEPATIC FUNCTION PANEL: CPT

## 2024-01-12 PROCEDURE — 6370000000 HC RX 637 (ALT 250 FOR IP): Performed by: NURSE PRACTITIONER

## 2024-01-12 PROCEDURE — 83550 IRON BINDING TEST: CPT

## 2024-01-12 PROCEDURE — P9045 ALBUMIN (HUMAN), 5%, 250 ML: HCPCS | Performed by: INTERNAL MEDICINE

## 2024-01-12 PROCEDURE — 49083 ABD PARACENTESIS W/IMAGING: CPT

## 2024-01-12 PROCEDURE — 83735 ASSAY OF MAGNESIUM: CPT

## 2024-01-12 PROCEDURE — 83540 ASSAY OF IRON: CPT

## 2024-01-12 RX ORDER — TOLVAPTAN 15 MG/1
15 TABLET ORAL ONCE
Status: COMPLETED | OUTPATIENT
Start: 2024-01-12 | End: 2024-01-12

## 2024-01-12 RX ORDER — CLINDAMYCIN PHOSPHATE 600 MG/50ML
600 INJECTION, SOLUTION INTRAVENOUS ONCE
Status: DISCONTINUED | OUTPATIENT
Start: 2024-01-12 | End: 2024-01-12

## 2024-01-12 RX ORDER — ALBUMIN, HUMAN INJ 5% 5 %
12.5 SOLUTION INTRAVENOUS ONCE
Status: COMPLETED | OUTPATIENT
Start: 2024-01-12 | End: 2024-01-12

## 2024-01-12 RX ADMIN — Medication 10 ML: at 20:17

## 2024-01-12 RX ADMIN — PANTOPRAZOLE SODIUM 40 MG: 40 TABLET, DELAYED RELEASE ORAL at 08:35

## 2024-01-12 RX ADMIN — SPIRONOLACTONE 50 MG: 25 TABLET ORAL at 08:35

## 2024-01-12 RX ADMIN — Medication 15 G: at 08:35

## 2024-01-12 RX ADMIN — Medication 15 G: at 20:17

## 2024-01-12 RX ADMIN — FOLIC ACID 1 MG: 1 TABLET ORAL at 08:35

## 2024-01-12 RX ADMIN — Medication 10 ML: at 08:36

## 2024-01-12 RX ADMIN — TOLVAPTAN 15 MG: 15 TABLET ORAL at 11:29

## 2024-01-12 RX ADMIN — ALBUMIN (HUMAN) 12.5 G: 12.5 INJECTION, SOLUTION INTRAVENOUS at 15:49

## 2024-01-12 ASSESSMENT — PAIN SCALES - GENERAL
PAINLEVEL_OUTOF10: 0
PAINLEVEL_OUTOF10: 0

## 2024-01-12 NOTE — PROGRESS NOTES
MD Tish Altamirano MD Samir Brahmbhatt, MD                  Office: [phone]                 Fax: [phone]         [employer]                          NEPHROLOGY INPATIENT PROGRESS  NOTE:     PATIENT NAME: Angélica Gonzalez  : 1985  MRN: [id number]    Patient Active Problem List   Diagnosis    Dizziness    Hyponatremia       Past Medical History:   has a past medical history of ADHD, Cirrhosis (HCC), ETOH abuse, and Hepatic cirrhosis (HCC).    Past Social History:   reports that she has never smoked. She has never used smokeless tobacco. She reports current alcohol use. She reports that she does not use drugs.      Subjective / interval history / nephrology update / medical decision making:   + Refer to assessment and plan    Noted resting comfortably in bed denies any active complaints at this     On 2024: Had a paracentesis , removed more than 5 L, given albumin  GI was reconsulted, they are planning for today again.    Except mild \"bloating     Abdominal distention still present but better   Said leg edema usually better in the morning during daytime and gets better  No shortness of breath,    On room air without hypoxia  Renal labs noted.        Objective:      BP (!) 107/57   Pulse 86   Temp 98.1 °F (36.7 °C) (Oral)   Resp 16   Ht 1.676 m (5' 6\")   Wt 86.2 kg (190 lb)   SpO2 99%   BMI 30.67 kg/m²     Wt Readings from Last 3 Encounters:   24 86.2 kg (190 lb)   11/15/23 83.9 kg (184 lb 14.4 oz)       BP Readings from Last 3 Encounters:   24 (!) 107/57   24 (!) 96/56   23 (!) 97/54     General: Awake, no acute distress   HENT: Atraumatic, normocephalic   Eyes: Normal conjunctiva, + icteral sclera.   Neck: Supple, JVD not visible.   CVS:  Heart sounds s1 s2+  RS: Normal respiratory effort, Breat sound: diminished at bases.     Abd:   , bowel sounds are normal, +  distension    Skin: icteral skin  CNS: Awake Oriented x3, No focal.

## 2024-01-12 NOTE — PROGRESS NOTES
Hospitalist Progress Note      PCP: No primary care provider on file.    Date of Admission: 1/6/2024    Chief Complaint: Dizziness    Hospital Course: 39 yo F with alcoholic cirrhosis, esophageal varices, portal hypertensive gastropathy with gastritis, ADHD came to ER with dizziness.  Had 7.5 L paracentesis on 1/5/24.  Admitted as inpatient for dizziness, acute anemia with acute hyponatremia.  Given Albumin and transfused 1 U PRBC.  Followed by GI and Renal.  Diuretics held.  Lasix resumed on 1/7/24. S/P Paracentesis of 6.6 L on 1/9.  Aldactone resumed on 1/10.    Subjective:  Patient denies dizziness.  No CP, SOB, HA or abdominal pain.    Beginning to get more distended after paracentesis.      Medications:  Reviewed    Infusion Medications    sodium chloride       Scheduled Medications    spironolactone  50 mg Oral Daily    amphetamine-dextroamphetamine  20 mg Oral QAM    folic acid  1 mg Oral Daily    pantoprazole  40 mg Oral QAM AC    sodium chloride flush  5-40 mL IntraVENous 2 times per day    urea  15 g Oral BID     PRN Meds: traMADol, sodium chloride flush, sodium chloride, potassium chloride **OR** potassium alternative oral replacement **OR** potassium chloride, magnesium sulfate, ondansetron **OR** ondansetron, polyethylene glycol, acetaminophen **OR** acetaminophen    No intake or output data in the 24 hours ending 01/12/24 1351      Physical Exam Performed:    BP (!) 107/57   Pulse 86   Temp 98.1 °F (36.7 °C) (Oral)   Resp 16   Ht 1.676 m (5' 6\")   Wt 86.2 kg (190 lb)   SpO2 99%   BMI 30.67 kg/m²     General appearance: No apparent distress, appears stated age and cooperative.    HEENT: Pupils equal, round, and reactive to light. Scleral icterus  Neck: Supple, with full range of motion. Trachea midline.  Respiratory:  Normal respiratory effort. Clear to auscultation, bilaterally without Rales/Wheezes/Rhonchi.  Cardiovascular: Regular rate and rhythm with normal S1/S2 without murmurs, rubs

## 2024-01-12 NOTE — PROGRESS NOTES
Nutrition Note    RECOMMENDATIONS  PO Diet: Continue current diet   ONS: None   Nutrition Support: None      NUTRITION ASSESSMENT   Pt triggered positive nursing nutrition screen for MST 2. Pt reports decreased appetite and PO intake over the past several weeks d/t fatigue and \"just not feeling well.\" Pt reports 3 lb weight loss which is not considered significant. Pt is currently NPO and feels hungry, states she is \"ready for a steak.\" Will monitor for adequate PO intake as diet advances and add oral nutrition supplements as needed.   Nutrition Related Findings: Jaundice. Na+ 124. +3 pitting BLE edema. LBM 1/9.  Wounds: None  Nutrition Education:  Education completed   Nutrition Goals: PO intake 50% or greater, prior to discharge     MALNUTRITION ASSESSMENT   Malnutrition Status: No malnutrition    NUTRITION DIAGNOSIS   No nutrition diagnosis at this time     CURRENT NUTRITION THERAPIES  ADULT DIET; Regular; Low Sodium (2 gm); 1200 ml     PO Intake: 51-75%   PO Supplement Intake:None Ordered    ANTHROPOMETRICS  Current Height: 167.6 cm (5' 6\")  Current Weight - Scale: 86.2 kg (190 lb)    Ideal Body Weight (IBW): 130 lbs  (59 kg)        BMI: 30.7    The patient will be monitored per nutrition standards of care. Consult dietitian if additional nutrition interventions are needed prior to RD reassessment.     Marta Anderson MS, RD, LD    Contact: [phone]

## 2024-01-12 NOTE — PROGRESS NOTES
5300ml of fluid removed  Spoke to patients RN and advised her the amount of fluid removed and that patient was returning to the floor.

## 2024-01-13 VITALS
SYSTOLIC BLOOD PRESSURE: 95 MMHG | HEART RATE: 98 BPM | OXYGEN SATURATION: 100 % | RESPIRATION RATE: 20 BRPM | TEMPERATURE: 98.1 F | HEIGHT: 66 IN | WEIGHT: 169.6 LBS | BODY MASS INDEX: 27.26 KG/M2 | DIASTOLIC BLOOD PRESSURE: 55 MMHG

## 2024-01-13 PROBLEM — K72.90 DECOMPENSATED HEPATIC CIRRHOSIS (HCC): Status: ACTIVE | Noted: 2024-01-13

## 2024-01-13 PROBLEM — I85.00 ESOPHAGEAL VARICES (HCC): Status: ACTIVE | Noted: 2024-01-13

## 2024-01-13 PROBLEM — D64.9 ANEMIA: Status: ACTIVE | Noted: 2024-01-13

## 2024-01-13 PROBLEM — Z98.890 S/P ABDOMINAL PARACENTESIS: Status: ACTIVE | Noted: 2024-01-13

## 2024-01-13 PROBLEM — K70.31 ASCITES DUE TO ALCOHOLIC CIRRHOSIS (HCC): Status: ACTIVE | Noted: 2024-01-13

## 2024-01-13 PROBLEM — K74.60 DECOMPENSATED HEPATIC CIRRHOSIS (HCC): Status: ACTIVE | Noted: 2024-01-13

## 2024-01-13 LAB
ALBUMIN SERPL-MCNC: 4.1 G/DL (ref 3.4–5)
ANION GAP SERPL CALCULATED.3IONS-SCNC: 12 MMOL/L (ref 3–16)
BUN SERPL-MCNC: 16 MG/DL (ref 7–20)
CALCIUM SERPL-MCNC: 9.6 MG/DL (ref 8.3–10.6)
CHLORIDE SERPL-SCNC: 97 MMOL/L (ref 99–110)
CO2 SERPL-SCNC: 24 MMOL/L (ref 21–32)
CREAT SERPL-MCNC: <0.5 MG/DL (ref 0.6–1.1)
DEPRECATED RDW RBC AUTO: 18.2 % (ref 12.4–15.4)
GFR SERPLBLD CREATININE-BSD FMLA CKD-EPI: >60 ML/MIN/{1.73_M2}
GLUCOSE SERPL-MCNC: 145 MG/DL (ref 70–99)
HCT VFR BLD AUTO: 22 % (ref 36–48)
HGB BLD-MCNC: 8.2 G/DL (ref 12–16)
MAGNESIUM SERPL-MCNC: 2.3 MG/DL (ref 1.8–2.4)
MCH RBC QN AUTO: 40.3 PG (ref 26–34)
MCHC RBC AUTO-ENTMCNC: 37.1 G/DL (ref 31–36)
MCV RBC AUTO: 108.7 FL (ref 80–100)
PHOSPHATE SERPL-MCNC: 3.1 MG/DL (ref 2.5–4.9)
PHOSPHATE SERPL-MCNC: 3.2 MG/DL (ref 2.5–4.9)
PLATELET # BLD AUTO: 79 K/UL (ref 135–450)
PMV BLD AUTO: 6.1 FL (ref 5–10.5)
POTASSIUM SERPL-SCNC: 4.4 MMOL/L (ref 3.5–5.1)
RBC # BLD AUTO: 2.03 M/UL (ref 4–5.2)
SODIUM SERPL-SCNC: 133 MMOL/L (ref 136–145)
WBC # BLD AUTO: 10.9 K/UL (ref 4–11)

## 2024-01-13 PROCEDURE — 6370000000 HC RX 637 (ALT 250 FOR IP): Performed by: INTERNAL MEDICINE

## 2024-01-13 PROCEDURE — 2580000003 HC RX 258: Performed by: NURSE PRACTITIONER

## 2024-01-13 PROCEDURE — 36415 COLL VENOUS BLD VENIPUNCTURE: CPT

## 2024-01-13 PROCEDURE — 6370000000 HC RX 637 (ALT 250 FOR IP): Performed by: NURSE PRACTITIONER

## 2024-01-13 PROCEDURE — 83735 ASSAY OF MAGNESIUM: CPT

## 2024-01-13 PROCEDURE — 84100 ASSAY OF PHOSPHORUS: CPT

## 2024-01-13 PROCEDURE — 80069 RENAL FUNCTION PANEL: CPT

## 2024-01-13 PROCEDURE — 85027 COMPLETE CBC AUTOMATED: CPT

## 2024-01-13 RX ORDER — FERROUS SULFATE 325(65) MG
325 TABLET ORAL
Qty: 30 TABLET | Refills: 5 | Status: ON HOLD | OUTPATIENT
Start: 2024-01-13

## 2024-01-13 RX ORDER — OMEPRAZOLE 20 MG/1
20 CAPSULE, DELAYED RELEASE ORAL
Qty: 90 CAPSULE | Refills: 1 | Status: ON HOLD | OUTPATIENT
Start: 2024-01-13

## 2024-01-13 RX ORDER — SPIRONOLACTONE 50 MG/1
50 TABLET, FILM COATED ORAL DAILY
Qty: 30 TABLET | Refills: 3 | Status: ON HOLD | OUTPATIENT
Start: 2024-01-13

## 2024-01-13 RX ORDER — CARVEDILOL 3.12 MG/1
3.12 TABLET ORAL 2 TIMES DAILY WITH MEALS
Qty: 30 TABLET | Refills: 0 | Status: ON HOLD | OUTPATIENT
Start: 2024-01-18

## 2024-01-13 RX ORDER — FUROSEMIDE 20 MG/1
20 TABLET ORAL 2 TIMES DAILY
Qty: 60 TABLET | Refills: 3 | Status: ON HOLD | OUTPATIENT
Start: 2024-01-13

## 2024-01-13 RX ADMIN — PANTOPRAZOLE SODIUM 40 MG: 40 TABLET, DELAYED RELEASE ORAL at 05:49

## 2024-01-13 RX ADMIN — Medication 15 G: at 07:44

## 2024-01-13 RX ADMIN — SPIRONOLACTONE 50 MG: 25 TABLET ORAL at 10:13

## 2024-01-13 RX ADMIN — DEXTROAMPHETAMINE SACCHARATE, AMPHETAMINE ASPARTATE MONOHYDRATE, DEXTROAMPHETAMINE SULFATE AND AMPHETAMINE SULFATE 20 MG: 2.5; 2.5; 2.5; 2.5 CAPSULE, EXTENDED RELEASE ORAL at 08:23

## 2024-01-13 RX ADMIN — Medication 10 ML: at 07:44

## 2024-01-13 RX ADMIN — FOLIC ACID 1 MG: 1 TABLET ORAL at 07:43

## 2024-01-13 ASSESSMENT — PAIN SCALES - GENERAL
PAINLEVEL_OUTOF10: 0
PAINLEVEL_OUTOF10: 0

## 2024-01-13 NOTE — DISCHARGE SUMMARY
V2.0  Discharge Summary    Name:  Angélica Gonzalez /Age/Sex: 1985 (38 y.o. female)   Admit Date: 2024  Discharge Date: 24    MRN & CSN:  [id number] & [id number] Encounter Date and Time 24 11:35 AM EST    Attending:  Fela Miller MD Discharging Provider: Fela Miller MD       Hospital Course:     Brief HPI: Angélica Gonzalez is a 38 y.o. female with decompensated alcoholic cirrhosis with ascites requiring frequent paracentesis, esophageal varices, portal hypertensive gastropathy with gastritis, ADHD came to ER with dizziness.  She had 7.5 L paracentesis on 24.  Admitted as inpatient for dizziness, acute anemia with acute hyponatremia.        Acute anemia with chronic Thrombocytopenia:  Likely due to liver cirrhosis.  Counts remain stable.  Transfused 1 U PRBC 23  Continue PPI, folate & Ferrous sulfate.     Coagulopathy due to chronic liver disease: INR 2.75.  No overt bleeding noted.        Dizziness: Resolved.    Likely due to large volume paracentesis, hypotension and hyponatremia.         Hyponatremia: Rachid 119.  Sodium improved to 133 with urea, diuretics, fluid restriction.  Given 1 dose of tolvaptan 2024.  Nephrology recommended repeat BMP in 1 week .  Continue 1200 mL/day fluid restriction, Lasix & Aldactone.      Decompensated alcoholic cirrhosis with ascites:  Coreg for esophageal diuresis held due to borderline BP.  Discharged on Lasix 20 mg twice daily and Aldactone 50 mg daily  S/P Paracenteses 24 & 2024 with 6.6 L & 5.3 L of fluid removed.  Cell counts not consistent with SBP.      Cholestatic Jaundice:  Likely due to underlying liver disease.  Advised to follow-up with hepatologist as soon as possible.      The patient expressed appropriate understanding of, and agreement with the discharge recommendations, medications, and plan.     Consults this admission:  IP CONSULT TO GI  IP CONSULT TO NEPHROLOGY  IP CONSULT TO DIETITIAN    Discharge  SYSTEM PROVIDED HISTORY: ascites TECHNOLOGIST PROVIDED HISTORY: Reason for exam:->ascites TECHNIQUE: Informed consent was obtained after a detailed explanation of the procedure including risks, benefits, and alternatives.  Universal protocol was followed.  A limited ultrasound of the abdomen was performed. The right abdomen was prepped and draped in sterile fashion and local anesthesia was achieved with lidocaine. Under ultrasound guidance, a 5 [place] needle sheath was advanced into ascites and paracentesis was performed.  The patient tolerated the procedure well. FINDINGS: Limited ultrasound of the abdomen demonstrates ascites. A total of 5300 mL of ascites fluid was removed.     Successful paracentesis.     IR US GUIDED PARACENTESIS    Result Date: 1/9/2024  PROCEDURE: PARACENTESIS WITH IMAGE GUIDANCE US ABDOMEN LIMITED 1/9/2024 HISTORY: ORDERING SYSTEM PROVIDED HISTORY: ascites TECHNOLOGIST PROVIDED HISTORY: Reason for exam:->ascites TECHNIQUE: Informed consent was obtained after a detailed explanation of the procedure including risks, benefits, and alternatives.  Universal protocol was followed.  A limited ultrasound of the abdomen was performed. The right abdomen was prepped and draped in sterile fashion and local anesthesia was achieved with lidocaine.  A 5 [place] needle sheath was advanced into ascites and paracentesis was performed.  The patient tolerated the procedure well. FINDINGS: Limited ultrasound of the abdomen demonstrates ascites. A total of 6600 ml dark yellow/serous ascites was removed.     Successful paracentesis.       CBC:   Recent Labs     01/11/24  0648 01/12/24  0450 01/13/24  0630   WBC 9.4 9.2 10.9   HGB 7.7* 7.5* 8.2*   PLT 70* 69* 79*     BMP:    Recent Labs     01/12/24  0450 01/12/24  0930 01/12/24  1603 01/12/24  2214 01/13/24  0630   *   < > 127* 129* 133*   K 4.0  --  4.0  --  4.4   CL 90*  --  91*  --  97*   CO2 25  --  23  --  24   BUN 27*  --  25*  --  16   CREATININE <0.5*

## 2024-01-13 NOTE — DISCHARGE INSTRUCTIONS
Follow-up with the transplant center later this month in January.  Follow-up with interventional radiologist outpatient for paracentesis as needed.  Your blood pressure remains low.  Carvedilol carvedilol dose has been decreased to 3.125 mg twice daily.  Do not start this until seen by PCP and blood pressures remain stable to tolerate this dose.  Repeat BMP & hepatic function panel within 1 week after discharge with PCP/Hepatologist.  Call to make an appointment with nephrologist in 1 week.  Continue low-sodium diet with 1200 mL fluid restriction in a day

## 2024-01-17 ENCOUNTER — HOSPITAL ENCOUNTER (OUTPATIENT)
Dept: INTERVENTIONAL RADIOLOGY/VASCULAR | Age: 39
Discharge: HOME | End: 2024-01-17

## 2024-01-17 ENCOUNTER — HOSPITAL ENCOUNTER (INPATIENT)
Age: 39
LOS: 6 days | Discharge: HOME | DRG: 641 | End: 2024-01-23
Attending: EMERGENCY MEDICINE | Admitting: INTERNAL MEDICINE
Payer: COMMERCIAL

## 2024-01-17 ENCOUNTER — APPOINTMENT (OUTPATIENT)
Dept: GENERAL RADIOLOGY | Age: 39
DRG: 641 | End: 2024-01-17
Payer: COMMERCIAL

## 2024-01-17 VITALS
HEART RATE: 98 BPM | DIASTOLIC BLOOD PRESSURE: 59 MMHG | SYSTOLIC BLOOD PRESSURE: 105 MMHG | RESPIRATION RATE: 15 BRPM | OXYGEN SATURATION: 100 % | TEMPERATURE: 96.8 F

## 2024-01-17 DIAGNOSIS — R17: ICD-10-CM

## 2024-01-17 DIAGNOSIS — E87.5 HYPERKALEMIA: ICD-10-CM

## 2024-01-17 DIAGNOSIS — R18.8 OTHER ASCITES: ICD-10-CM

## 2024-01-17 DIAGNOSIS — E87.1 HYPONATREMIA: ICD-10-CM

## 2024-01-17 DIAGNOSIS — E87.1 HYPONATREMIA: Primary | ICD-10-CM

## 2024-01-17 DIAGNOSIS — K70.31 ASCITES DUE TO ALCOHOLIC CIRRHOSIS (HCC): ICD-10-CM

## 2024-01-17 DIAGNOSIS — R17 CHOLESTATIC JAUNDICE: ICD-10-CM

## 2024-01-17 PROBLEM — K21.9 GERD (GASTROESOPHAGEAL REFLUX DISEASE): Status: ACTIVE | Noted: 2024-01-17

## 2024-01-17 LAB
ALBUMIN SERPL-MCNC: 3.8 G/DL (ref 3.4–5)
ALBUMIN SERPL-MCNC: 3.9 G/DL (ref 3.4–5)
ALP SERPL-CCNC: 153 U/L (ref 40–129)
ALP SERPL-CCNC: 155 U/L (ref 40–129)
ALT SERPL-CCNC: 26 U/L (ref 10–40)
ALT SERPL-CCNC: 28 U/L (ref 10–40)
ANION GAP SERPL CALCULATED.3IONS-SCNC: 11 MMOL/L (ref 3–16)
ANION GAP SERPL CALCULATED.3IONS-SCNC: 13 MMOL/L (ref 3–16)
AST SERPL-CCNC: 65 U/L (ref 15–37)
AST SERPL-CCNC: 67 U/L (ref 15–37)
BASOPHILS # BLD: 0.1 K/UL (ref 0–0.2)
BASOPHILS NFR BLD: 0.5 %
BILIRUB DIRECT SERPL-MCNC: 8.4 MG/DL (ref 0–0.3)
BILIRUB DIRECT SERPL-MCNC: 9.6 MG/DL (ref 0–0.3)
BILIRUB INDIRECT SERPL-MCNC: 12.7 MG/DL (ref 0–1)
BILIRUB INDIRECT SERPL-MCNC: 13.2 MG/DL (ref 0–1)
BILIRUB SERPL-MCNC: 21.1 MG/DL (ref 0–1)
BILIRUB SERPL-MCNC: 22.8 MG/DL (ref 0–1)
BUN SERPL-MCNC: 32 MG/DL (ref 7–20)
BUN SERPL-MCNC: 35 MG/DL (ref 7–20)
CALCIUM SERPL-MCNC: 8.3 MG/DL (ref 8.3–10.6)
CALCIUM SERPL-MCNC: 8.9 MG/DL (ref 8.3–10.6)
CHLORIDE SERPL-SCNC: 86 MMOL/L (ref 99–110)
CHLORIDE SERPL-SCNC: 90 MMOL/L (ref 99–110)
CO2 SERPL-SCNC: 22 MMOL/L (ref 21–32)
CO2 SERPL-SCNC: 22 MMOL/L (ref 21–32)
CREAT SERPL-MCNC: <0.5 MG/DL (ref 0.6–1.1)
CREAT SERPL-MCNC: <0.5 MG/DL (ref 0.6–1.1)
CREAT UR-MCNC: 38.3 MG/DL (ref 28–259)
DEPRECATED RDW RBC AUTO: 18.8 % (ref 12.4–15.4)
DEPRECATED RDW RBC AUTO: 18.9 % (ref 12.4–15.4)
EKG ATRIAL RATE: 102 BPM
EKG DIAGNOSIS: NORMAL
EKG P AXIS: 63 DEGREES
EKG P-R INTERVAL: 144 MS
EKG Q-T INTERVAL: 362 MS
EKG QRS DURATION: 84 MS
EKG QTC CALCULATION (BAZETT): 471 MS
EKG R AXIS: 83 DEGREES
EKG T AXIS: 46 DEGREES
EKG VENTRICULAR RATE: 102 BPM
EOSINOPHIL # BLD: 0.3 K/UL (ref 0–0.6)
EOSINOPHIL NFR BLD: 2 %
GFR SERPLBLD CREATININE-BSD FMLA CKD-EPI: >60 ML/MIN/{1.73_M2}
GFR SERPLBLD CREATININE-BSD FMLA CKD-EPI: >60 ML/MIN/{1.73_M2}
GLUCOSE SERPL-MCNC: 109 MG/DL (ref 70–99)
GLUCOSE SERPL-MCNC: 184 MG/DL (ref 70–99)
HCT VFR BLD AUTO: 22.7 % (ref 36–48)
HCT VFR BLD AUTO: 23.2 % (ref 36–48)
HGB BLD-MCNC: 8.1 G/DL (ref 12–16)
HGB BLD-MCNC: 8.4 G/DL (ref 12–16)
INR PPP: 2.76 (ref 0.84–1.16)
LACTATE BLDV-SCNC: 2.3 MMOL/L (ref 0.4–1.9)
LACTATE BLDV-SCNC: 2.4 MMOL/L (ref 0.4–1.9)
LYMPHOCYTES # BLD: 1.5 K/UL (ref 1–5.1)
LYMPHOCYTES NFR BLD: 10.4 %
MCH RBC QN AUTO: 38.8 PG (ref 26–34)
MCH RBC QN AUTO: 39.7 PG (ref 26–34)
MCHC RBC AUTO-ENTMCNC: 35.5 G/DL (ref 31–36)
MCHC RBC AUTO-ENTMCNC: 36.2 G/DL (ref 31–36)
MCV RBC AUTO: 109.4 FL (ref 80–100)
MCV RBC AUTO: 109.9 FL (ref 80–100)
MICROALBUMIN UR DL<=1MG/L-MCNC: <1.2 MG/DL
MICROALBUMIN/CREAT UR: NORMAL MG/G (ref 0–30)
MONOCYTES # BLD: 1.5 K/UL (ref 0–1.3)
MONOCYTES NFR BLD: 10.4 %
NEUTROPHILS # BLD: 11.3 K/UL (ref 1.7–7.7)
NEUTROPHILS NFR BLD: 76.7 %
PHOSPHATE SERPL-MCNC: 4.2 MG/DL (ref 2.5–4.9)
PLATELET # BLD AUTO: 76 K/UL (ref 135–450)
PLATELET # BLD AUTO: 90 K/UL (ref 135–450)
PLATELET BLD QL SMEAR: ABNORMAL
PMV BLD AUTO: 6.1 FL (ref 5–10.5)
PMV BLD AUTO: 6.9 FL (ref 5–10.5)
POTASSIUM SERPL-SCNC: 4.9 MMOL/L (ref 3.5–5.1)
POTASSIUM SERPL-SCNC: 5.3 MMOL/L (ref 3.5–5.1)
PROT SERPL-MCNC: 5.7 G/DL (ref 6.4–8.2)
PROT SERPL-MCNC: 5.9 G/DL (ref 6.4–8.2)
PROTHROMBIN TIME: 29 SEC (ref 11.5–14.8)
RBC # BLD AUTO: 2.08 M/UL (ref 4–5.2)
RBC # BLD AUTO: 2.11 M/UL (ref 4–5.2)
SLIDE REVIEW: ABNORMAL
SODIUM SERPL-SCNC: 121 MMOL/L (ref 136–145)
SODIUM SERPL-SCNC: 123 MMOL/L (ref 136–145)
WBC # BLD AUTO: 11.3 K/UL (ref 4–11)
WBC # BLD AUTO: 14.7 K/UL (ref 4–11)

## 2024-01-17 PROCEDURE — 6370000000 HC RX 637 (ALT 250 FOR IP): Performed by: PHYSICIAN ASSISTANT

## 2024-01-17 PROCEDURE — P9047 ALBUMIN (HUMAN), 25%, 50ML: HCPCS | Performed by: INTERNAL MEDICINE

## 2024-01-17 PROCEDURE — 93005 ELECTROCARDIOGRAM TRACING: CPT | Performed by: PHYSICIAN ASSISTANT

## 2024-01-17 PROCEDURE — 2580000003 HC RX 258: Performed by: INTERNAL MEDICINE

## 2024-01-17 PROCEDURE — 6360000002 HC RX W HCPCS: Performed by: INTERNAL MEDICINE

## 2024-01-17 PROCEDURE — 87040 BLOOD CULTURE FOR BACTERIA: CPT

## 2024-01-17 PROCEDURE — 85025 COMPLETE CBC W/AUTO DIFF WBC: CPT

## 2024-01-17 PROCEDURE — 85610 PROTHROMBIN TIME: CPT

## 2024-01-17 PROCEDURE — 99285 EMERGENCY DEPT VISIT HI MDM: CPT

## 2024-01-17 PROCEDURE — 80076 HEPATIC FUNCTION PANEL: CPT

## 2024-01-17 PROCEDURE — 71045 X-RAY EXAM CHEST 1 VIEW: CPT

## 2024-01-17 PROCEDURE — 83605 ASSAY OF LACTIC ACID: CPT

## 2024-01-17 PROCEDURE — 93010 ELECTROCARDIOGRAM REPORT: CPT | Performed by: INTERNAL MEDICINE

## 2024-01-17 PROCEDURE — 80048 BASIC METABOLIC PNL TOTAL CA: CPT

## 2024-01-17 PROCEDURE — 2060000000 HC ICU INTERMEDIATE R&B

## 2024-01-17 RX ORDER — POTASSIUM CHLORIDE 20 MEQ/1
40 TABLET, EXTENDED RELEASE ORAL PRN
Status: DISCONTINUED | OUTPATIENT
Start: 2024-01-17 | End: 2024-01-23 | Stop reason: HOSPADM

## 2024-01-17 RX ORDER — ONDANSETRON 2 MG/ML
4 INJECTION INTRAMUSCULAR; INTRAVENOUS EVERY 6 HOURS PRN
Status: DISCONTINUED | OUTPATIENT
Start: 2024-01-17 | End: 2024-01-23 | Stop reason: HOSPADM

## 2024-01-17 RX ORDER — ACETAMINOPHEN 325 MG/1
650 TABLET ORAL EVERY 6 HOURS PRN
Status: DISCONTINUED | OUTPATIENT
Start: 2024-01-17 | End: 2024-01-23 | Stop reason: HOSPADM

## 2024-01-17 RX ORDER — ACETAMINOPHEN 650 MG/1
650 SUPPOSITORY RECTAL EVERY 6 HOURS PRN
Status: DISCONTINUED | OUTPATIENT
Start: 2024-01-17 | End: 2024-01-23 | Stop reason: HOSPADM

## 2024-01-17 RX ORDER — CARVEDILOL 3.12 MG/1
3.12 TABLET ORAL 2 TIMES DAILY WITH MEALS
Status: DISCONTINUED | OUTPATIENT
Start: 2024-01-18 | End: 2024-01-23 | Stop reason: HOSPADM

## 2024-01-17 RX ORDER — FUROSEMIDE 40 MG/1
40 TABLET ORAL 2 TIMES DAILY
Status: DISCONTINUED | OUTPATIENT
Start: 2024-01-18 | End: 2024-01-20

## 2024-01-17 RX ORDER — ALBUMIN (HUMAN) 12.5 G/50ML
12.5 SOLUTION INTRAVENOUS EVERY 8 HOURS
Status: DISCONTINUED | OUTPATIENT
Start: 2024-01-17 | End: 2024-01-18

## 2024-01-17 RX ORDER — TRAMADOL HYDROCHLORIDE 50 MG/1
50 TABLET ORAL EVERY 6 HOURS PRN
Status: DISCONTINUED | OUTPATIENT
Start: 2024-01-17 | End: 2024-01-23 | Stop reason: HOSPADM

## 2024-01-17 RX ORDER — HYDRALAZINE HYDROCHLORIDE 20 MG/ML
10 INJECTION INTRAMUSCULAR; INTRAVENOUS EVERY 6 HOURS PRN
Status: DISCONTINUED | OUTPATIENT
Start: 2024-01-17 | End: 2024-01-23 | Stop reason: HOSPADM

## 2024-01-17 RX ORDER — ONDANSETRON 4 MG/1
4 TABLET, ORALLY DISINTEGRATING ORAL ONCE
Status: COMPLETED | OUTPATIENT
Start: 2024-01-17 | End: 2024-01-17

## 2024-01-17 RX ORDER — POTASSIUM CHLORIDE 20 MEQ/1
40 TABLET, EXTENDED RELEASE ORAL PRN
Status: DISCONTINUED | OUTPATIENT
Start: 2024-01-17 | End: 2024-01-17 | Stop reason: SDUPTHER

## 2024-01-17 RX ORDER — FERROUS SULFATE 325(65) MG
325 TABLET ORAL
Status: DISCONTINUED | OUTPATIENT
Start: 2024-01-18 | End: 2024-01-23 | Stop reason: HOSPADM

## 2024-01-17 RX ORDER — POTASSIUM CHLORIDE 7.45 MG/ML
10 INJECTION INTRAVENOUS PRN
Status: DISCONTINUED | OUTPATIENT
Start: 2024-01-17 | End: 2024-01-23 | Stop reason: HOSPADM

## 2024-01-17 RX ORDER — SODIUM CHLORIDE 0.9 % (FLUSH) 0.9 %
5-40 SYRINGE (ML) INJECTION PRN
Status: DISCONTINUED | OUTPATIENT
Start: 2024-01-17 | End: 2024-01-23 | Stop reason: HOSPADM

## 2024-01-17 RX ORDER — SODIUM CHLORIDE 9 MG/ML
INJECTION, SOLUTION INTRAVENOUS PRN
Status: DISCONTINUED | OUTPATIENT
Start: 2024-01-17 | End: 2024-01-23 | Stop reason: HOSPADM

## 2024-01-17 RX ORDER — ONDANSETRON 4 MG/1
4 TABLET, ORALLY DISINTEGRATING ORAL EVERY 8 HOURS PRN
Status: DISCONTINUED | OUTPATIENT
Start: 2024-01-17 | End: 2024-01-23 | Stop reason: HOSPADM

## 2024-01-17 RX ORDER — SPIRONOLACTONE 25 MG/1
50 TABLET ORAL DAILY
Status: DISCONTINUED | OUTPATIENT
Start: 2024-01-18 | End: 2024-01-17

## 2024-01-17 RX ORDER — ENOXAPARIN SODIUM 100 MG/ML
40 INJECTION SUBCUTANEOUS DAILY
Status: DISCONTINUED | OUTPATIENT
Start: 2024-01-18 | End: 2024-01-23 | Stop reason: HOSPADM

## 2024-01-17 RX ORDER — 0.9 % SODIUM CHLORIDE 0.9 %
500 INTRAVENOUS SOLUTION INTRAVENOUS PRN
Status: DISCONTINUED | OUTPATIENT
Start: 2024-01-17 | End: 2024-01-23 | Stop reason: HOSPADM

## 2024-01-17 RX ORDER — SODIUM CHLORIDE 0.9 % (FLUSH) 0.9 %
5-40 SYRINGE (ML) INJECTION EVERY 12 HOURS SCHEDULED
Status: DISCONTINUED | OUTPATIENT
Start: 2024-01-17 | End: 2024-01-23 | Stop reason: HOSPADM

## 2024-01-17 RX ORDER — FOLIC ACID 1 MG/1
1 TABLET ORAL DAILY
Status: DISCONTINUED | OUTPATIENT
Start: 2024-01-18 | End: 2024-01-23 | Stop reason: HOSPADM

## 2024-01-17 RX ORDER — SODIUM CHLORIDE 9 MG/ML
INJECTION, SOLUTION INTRAVENOUS CONTINUOUS
Status: DISCONTINUED | OUTPATIENT
Start: 2024-01-17 | End: 2024-01-17

## 2024-01-17 RX ORDER — POTASSIUM CHLORIDE 7.45 MG/ML
10 INJECTION INTRAVENOUS PRN
Status: DISCONTINUED | OUTPATIENT
Start: 2024-01-17 | End: 2024-01-17 | Stop reason: SDUPTHER

## 2024-01-17 RX ORDER — FUROSEMIDE 40 MG/1
20 TABLET ORAL 2 TIMES DAILY
Status: DISCONTINUED | OUTPATIENT
Start: 2024-01-18 | End: 2024-01-17

## 2024-01-17 RX ORDER — PANTOPRAZOLE SODIUM 40 MG/1
40 TABLET, DELAYED RELEASE ORAL
Status: DISCONTINUED | OUTPATIENT
Start: 2024-01-18 | End: 2024-01-23 | Stop reason: HOSPADM

## 2024-01-17 RX ADMIN — SODIUM CHLORIDE, PRESERVATIVE FREE 10 ML: 5 INJECTION INTRAVENOUS at 21:14

## 2024-01-17 RX ADMIN — ONDANSETRON 4 MG: 4 TABLET, ORALLY DISINTEGRATING ORAL at 14:30

## 2024-01-17 RX ADMIN — CEFTRIAXONE SODIUM 1000 MG: 1 INJECTION, POWDER, FOR SOLUTION INTRAMUSCULAR; INTRAVENOUS at 18:34

## 2024-01-17 RX ADMIN — ALBUMIN (HUMAN) 12.5 G: 0.25 INJECTION, SOLUTION INTRAVENOUS at 21:13

## 2024-01-17 ASSESSMENT — LIFESTYLE VARIABLES
HOW MANY STANDARD DRINKS CONTAINING ALCOHOL DO YOU HAVE ON A TYPICAL DAY: PATIENT DOES NOT DRINK
HOW OFTEN DO YOU HAVE A DRINK CONTAINING ALCOHOL: NEVER

## 2024-01-17 ASSESSMENT — PAIN - FUNCTIONAL ASSESSMENT: PAIN_FUNCTIONAL_ASSESSMENT: NONE - DENIES PAIN

## 2024-01-17 NOTE — CONSULTS
Gastroenterology Consult Note                          Patient: Angélica Gonzalez  : 1985  CSN#:      Date:  2024    Subjective:       History of Present Illness  Patient is a 38 y.o. female admitted with Dizziness [R42]  Hyponatremia [E87.1] who is seen in consult for cirrhosis.  She was recently diagnosed with alcoholic related cirrhosis.  Recently seen by Dr. Burns.  He did an EGD that showed grade 1 and grade 2 varices.  He started her on carvedilol.  She has a history of drinking 6 shots of liquor a day.  She stopped in September and has not had any alcohol since.  Her cirrhosis is further decompensated by the presence of large volume ascites.  She has had multiple paracenteses.  Her most recent was yesterday on 2024.  She had 7.5 L of ascites removed.  Fluid studies are negative for SBP.  She also been on furosemide and spironolactone.  She felt fine initially after her paracentesis yesterday and then felt weak and lightheaded.  So she was sent to the ER.  There she was found to be hyponatremic sodium 120.  Her bilirubin is also elevated more than her most recent baseline.  Last baseline was bilirubin of 7.  She denies any fevers GI bleeding or other complaints.      Past Medical History:   Diagnosis Date    ADHD     Cirrhosis (HCC)     ETOH abuse     Hepatic cirrhosis (HCC)       Past Surgical History:   Procedure Laterality Date    PARACENTESIS  10/14/2023    UPPER GASTROINTESTINAL ENDOSCOPY N/A 11/15/2023    EGD BIOPSY performed by Jone Burns MD at [facility] ENDOSCOPY       Admission Meds  No current facility-administered medications on file prior to encounter.     Current Outpatient Medications on File Prior to Encounter   Medication Sig Dispense Refill    amphetamine-dextroamphetamine (ADDERALL) 15 MG tablet Take 1 tablet by mouth daily.  at 5pm      levonorgestrel (MIRENA) IUD 52 mg 1 each by IntraUTERine route once      folic acid (FOLVITE) 1 MG tablet Take 1 tablet by

## 2024-01-17 NOTE — PROGRESS NOTES
Pt admitted for hyponatremia      Full h+p to follow    Active Hospital Problems    Diagnosis Date Noted    GERD (gastroesophageal reflux disease) [K21.9] 01/17/2024    Ascites due to alcoholic cirrhosis (HCC) [K70.31] 01/13/2024    Decompensated hepatic cirrhosis (HCC) [K72.90, K74.60] 01/13/2024    Anemia [D64.9] 01/13/2024    Hyponatremia [E87.1] 01/06/2024       Please use [employer] to contact me with any questions during the day.   The hospitalist service will provide cross-coverage for this patient from 7pm to 7am.    During those hours, contact the on-call hospitalist MD/AMANDA for questions.

## 2024-01-17 NOTE — CONSULTS
[facility]  Consultation Note    Reason for Consult:  Acute on Chronic Hyponatremia, Hyperkalemia  Requesting Physician:  JAMEY PETERSON    CHIEF COMPLAINT: Low serum sodium.    History obtained from records and patient.    HISTORY OF PRESENT ILLNESS:                 Angélica Gonzalez  is 38 y.o., female with significant past medical history of Alcoholic Liver Cirrhosis, Hyponatremia, best Na around 133, usually around 127-129, ADHD,  who presents due to recommendation by Dr. Carr since Na very low.  Na currently 123.  Lowest sbp in the 90's range.  On Lasix 20mg bid and SPLT 50mg daily as outpatient.  No H/O Seizures.  No regular NSAID use.  Has nausea.  Last alcohol drink around September 2023.  Seen by MEDARDO YANCEY 5.3.  Crea<0.5.  Was planning for paracentesis today.  She was taking urea 15 g daily as an outpatient.    Past Medical History:     has a past medical history of ADHD, Cirrhosis (HCC), ETOH abuse, and Hepatic cirrhosis (HCC).   Past Surgical History:     has a past surgical history that includes Paracentesis (10/14/2023) and Upper gastrointestinal endoscopy (N/A, 11/15/2023).   Current Medications:    Current Facility-Administered Medications: urea (URE-NA) packet 15 g, 15 g, Oral, BID  Allergies:    Patient has no known allergies.   Social History:     reports that she has never smoked. She has never used smokeless tobacco. She reports current alcohol use. She reports that she does not use drugs.   Family History:   family history is not on file.     REVIEW OF SYSTEMS:    System review was done , pertinent positives are mentioned in the HPI    Positive fatigue  No chest pain nor palpitations  No SOB, coughing nor hemoptysis  Has abdominal distention.  Episodes of nausea but no vomiting.  Intermittent diarrhea.  No fever/chills  Has chronic leg swelling  No change in urine output nor gross hematuria    PHYSICAL EXAM:    Vitals:  BP (!) 125/46   Pulse (!) 105   Temp

## 2024-01-17 NOTE — ED PROVIDER NOTES
[facility]  EMERGENCY DEPARTMENT ENCOUNTER        Pt Name: Angélica Gonzalez  MRN: [id number]  Birthdate 1985  Date of evaluation: 1/17/2024  Provider: NICHOLAS Varela  PCP: Dee Dee Pinon  Note Started: 2:52 PM EST 1/17/24       I have seen and evaluated this patient with my supervising physician Rylie Boykin MD.      CHIEF COMPLAINT       Chief Complaint   Patient presents with    Jaundice     Pt via self from home, pt hx of liver cirrhosis, pt is jaundiced and had abnormal labs (Na+), had paracentesis on 1/5 and 1/9       HISTORY OF PRESENT ILLNESS: 1 or more Elements     History from : Patient    Limitations to history : None    Angélica Gonzalez is a 38 y.o. female who presents to the emergency room due to jaundice secondary to liver cirrhosis from alcohol use.  She states that she recently had labs drawn not too long ago that showed that her sodium was low.  She was advised by her nephrologist Dr. Carr to come to the emergency department to be admitted.  She states that she recently had a paracentesis on January 5 she usually gets a paracentesis roughly every 2 weeks.  She states that she was recently hospitalized due to low sodium.  She states that her jaundice is worse than what her baseline jaundice is today.  She denies any significant abdominal pain.  She does have some mild nausea but no vomiting episodes.  She denies any chest pain, shortness of breath or difficulty breathing.  She states that she feels lightheaded and dizzy she also states that her hands have been cramping up over the last day or so most likely related to her low sodium levels.    Nursing Notes were all reviewed and agreed with or any disagreements were addressed in the HPI.    REVIEW OF SYSTEMS :      Review of Systems   Constitutional:  Negative for chills, diaphoresis and fever.   HENT:  Negative for congestion, rhinorrhea and sore throat.    Eyes:  Negative for pain and visual  daily      traMADol (ULTRAM) 50 MG tablet Take 1 tablet by mouth every 6 hours as needed for Pain.      amphetamine-dextroamphetamine (ADDERALL XR) 20 MG extended release capsule Take 1 capsule by mouth every morning.             ALLERGIES     Patient has no known allergies.    FAMILYHISTORY       Family History   Problem Relation Age of Onset    No Known Problems Mother     No Known Problems Father     No Known Problems Sister         SOCIAL HISTORY       Social History     Tobacco Use    Smoking status: Never    Smokeless tobacco: Never   Vaping Use    Vaping Use: Never used   Substance Use Topics    Alcohol use: Not Currently     Comment: Stopped drinkin on september 24th, 2023, it was daily since 2020    Drug use: Never       SCREENINGS        Apex Coma Scale  Eye Opening: Spontaneous  Best Verbal Response: Oriented  Best Motor Response: Obeys commands  Shady Coma Scale Score: 15                CIWA Assessment  BP: 100/61  Pulse: 95           PHYSICAL EXAM  1 or more Elements     ED Triage Vitals [01/17/24 1415]   BP Temp Temp Source Pulse Respirations SpO2 Height Weight - Scale   (!) 125/46 98.8 °F (37.1 °C) Oral (!) 105 19 100 % 1.651 m (5' 5\") 82.8 kg (182 lb 8 oz)       Physical Exam  Vitals and nursing note reviewed.   Constitutional:       General: She is not in acute distress.     Appearance: She is not ill-appearing.   Cardiovascular:      Rate and Rhythm: Regular rhythm. Tachycardia present.      Heart sounds: Normal heart sounds.   Pulmonary:      Effort: Pulmonary effort is normal.      Breath sounds: Normal breath sounds.   Abdominal:      General: Abdomen is protuberant. Bowel sounds are normal. There is distension.      Palpations: There is no mass.      Tenderness: There is no abdominal tenderness. There is no guarding or rebound.      Hernia: No hernia is present.   Skin:     Coloration: Skin is jaundiced.   Neurological:      Mental Status: She is alert and oriented to person, place, and

## 2024-01-17 NOTE — PROGRESS NOTES
Patient seen in ED, room 11.  Admission completed with the following exceptions:  Home medications will be completed per pharmacy technician.  The Floor RN will need to complete the 4 Eyes Assessment, Immunizations, Vaccines, Rights and Responsibilities, Orientation to room, Plan of Care, Education/Learning Assessment and Education, white board, height and weight, pain assessment and head to toe assessment.  Patient is alert and oriented X 4.  Patient lives in a house and is being admitted for Hyponatremia.  Plan of care updated if indicated.  All questions answered.     Patient reports last drink of alcohol was September 2023: patient goes to group therapy two times a week and also has individual therapy for alcohol.

## 2024-01-17 NOTE — H&P
History and Physical  Dr. Potter  1/17/2024    PCP: Dee Dee Pinon    Cc:   Chief Complaint   Patient presents with    Jaundice     Pt via self from home, pt hx of liver cirrhosis, pt is jaundiced and had abnormal labs (Na+), had paracentesis on 1/5 and 1/9       HPI:  Angélica Gonzalez is a 38 y.o. female who has a past medical history of ADHD, Cirrhosis (HCC), ETOH abuse, and Hepatic cirrhosis (HCC).     Patient presents with Hyponatremia.  HPI  (1-3 for expanded problem focused, ?4 for detailed/comprehensive)     38 y.o. female who presents to the emergency room due to jaundice secondary to liver cirrhosis from alcohol use.  She states that she recently had labs drawn not too long ago that showed that her sodium was low.  She was advised by her nephrologist Dr. Carr to come to the emergency department to be admitted.  She states that she recently had a paracentesis on January 5 she usually gets a paracentesis roughly every 2 weeks.  She states that she was recently hospitalized due to low sodium.  She states that her jaundice is worse than what her baseline jaundice is today.  She denies any significant abdominal pain.    Na is noted to be 123. There is some ascites. Pt is jaundiced. Bili is 13.  INR 2.76    Old chart reviewed; pt with recent hospitalization for similar sx. Required two paracentesis at that time. No evidence of SBP    Problem list of hospitalization thus far:  Active Hospital Problems    Diagnosis     GERD (gastroesophageal reflux disease) [K21.9]     Ascites due to alcoholic cirrhosis (HCC) [K70.31]     Decompensated hepatic cirrhosis (HCC) [K72.90, K74.60]     Anemia [D64.9]     Hyponatremia [E87.1]          Review of Systems: (1 system for EPF, 2-9 for detailed, 10+ for comprehensive)  Constitutional: Negative for chills and fever.     HENT: Negative for dental problem, nosebleeds and rhinorrhea.      Eyes: Negative for photophobia and visual disturbance.     Respiratory: Negative for  enlarged, symmetric, no tenderness/mass/nodules     Respiratory: clear to auscultation and percussion bilaterally with normal respiratory effort    Cardiovascular: normal rate, regular rhythm, normal S1 and S2 and no murmurs    Gastrointestinal: soft, non-tender, mod distended, normal bowel sounds, no masses or organomegaly    Extremities: no clubbing, no edema    Skin:No rashes or nodules noted. +jaundice  Neurologic:negative         LABS:  Labs Reviewed   CBC WITH AUTO DIFFERENTIAL - Abnormal; Notable for the following components:       Result Value    WBC 14.7 (*)     RBC 2.08 (*)     Hemoglobin 8.1 (*)     Hematocrit 22.7 (*)     .4 (*)     MCH 38.8 (*)     RDW 18.8 (*)     Platelets 90 (*)     Neutrophils Absolute 11.3 (*)     Monocytes Absolute 1.5 (*)     All other components within normal limits   BASIC METABOLIC PANEL - Abnormal; Notable for the following components:    Sodium 123 (*)     Potassium 5.3 (*)     Chloride 90 (*)     Glucose 109 (*)     BUN 32 (*)     Creatinine <0.5 (*)     All other components within normal limits   HEPATIC FUNCTION PANEL - Abnormal; Notable for the following components:    Total Protein 5.9 (*)     Alkaline Phosphatase 153 (*)     AST 67 (*)     Total Bilirubin 22.8 (*)     Bilirubin, Direct 9.6 (*)     Bilirubin, Indirect 13.2 (*)     All other components within normal limits   LACTATE, SEPSIS - Abnormal; Notable for the following components:    Lactic Acid, Sepsis 2.4 (*)     All other components within normal limits   PROTIME-INR - Abnormal; Notable for the following components:    Protime 29.0 (*)     INR 2.76 (*)     All other components within normal limits   CULTURE, BLOOD 1   CULTURE, BLOOD 2   LACTATE, SEPSIS   OSMOLALITY, URINE   SODIUM, URINE, RANDOM   POTASSIUM, URINE, RANDOM   URINALYSIS WITH REFLEX TO CULTURE         IMAGING:  Imaging results from computerized chart.  Any imaging that has been independently reviewed as noted elsewhere in chart.  XR CHEST

## 2024-01-17 NOTE — CONSULTS
Gastroenterology Consult Note                          Patient: Angélica Gonzalez  : 1985  CSN#:      Date:  2024    Subjective:       History of Present Illness  Patient is a 38 y.o.  female admitted with Hyponatremia [E87.1] who is seen in consult for cirrhosis and jaundice.  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  She presented today for a paracentesis but laboratory testing revealed a hyponatremia and hyperkalemia.  Since she was feeling weak as well, she was referred to the emergency room for possible admission.  She is treated with spironolactone and furosemide for the ascites, with the spironolactone dose increased to 50 mg daily recently.  She denies melena, hematochezia, nausea, vomiting, fevers, chills, sweats.  She does note increased abdominal girth but denies abdominal pain, chest pain, or shortness of breath.  She did not receive her paracentesis today due to the above abnormalities.    She is to be seen at [facility] for evaluation.  She has been abstinent from alcohol for 3 months (2023 was her last drink) and she is undergoing alcohol rehabilitation as an outpatient.  She denies any alcohol use recently nor any recreational drug use.      Past Medical History:   Diagnosis Date    ADHD     Cirrhosis (HCC)     Dyslexia     ETOH abuse     Hepatic cirrhosis (HCC)     History of blood transfusion       Past Surgical History:   Procedure Laterality Date    PARACENTESIS  10/14/2023    had several: most recent Friday the     UPPER GASTROINTESTINAL ENDOSCOPY N/A 11/15/2023    EGD BIOPSY performed by Jone Burns MD at [facility] ENDOSCOPY      Past Endoscopic History: EGD 11/15/2023: small esophageal varices and mild portal hypertensive  gastropathy.    Admission Meds  No current facility-administered medications on file prior to encounter.     Current Outpatient Medications on File Prior to Encounter   Medication Sig Dispense Refill    omeprazole (PRILOSEC) 20 MG delayed release capsule Take 1 capsule by mouth every morning (before breakfast) 90 capsule 1    furosemide (LASIX) 20 MG tablet Take 1 tablet by mouth 2 times daily 60 tablet 3    spironolactone (ALDACTONE) 50 MG tablet Take 1 tablet by mouth daily 30 tablet 3    urea (URE-NA) 15 g PACK packet Take 15 g by mouth daily for 7 days 7 each 0    [START ON 1/18/2024] carvedilol (COREG) 3.125 MG tablet Take 1 tablet by mouth 2 times daily (with meals) DO NOT START UNTIL SEEN BY PCP IN 1 WEEK AND YOUR BP IS STABLE. 30 tablet 0    ferrous sulfate (IRON 325) 325 (65 Fe) MG tablet Take 1 tablet by mouth daily (with breakfast) 30 tablet 5    amphetamine-dextroamphetamine (ADDERALL) 15 MG tablet Take 1 tablet by mouth daily.  at 5pm      levonorgestrel (MIRENA) IUD 52 mg 1 each by IntraUTERine route once      folic acid (FOLVITE) 1 MG tablet Take 1 tablet by mouth daily      traMADol (ULTRAM) 50 MG tablet Take 1 tablet by mouth every 6 hours as needed for Pain.      amphetamine-dextroamphetamine (ADDERALL XR) 20 MG extended release capsule Take 1 capsule by mouth every morning.         Patient denies ASA, NSAID use.    Allergies  No Known Allergies   Social   Social History     Tobacco Use    Smoking status: Never    Smokeless tobacco: Never   Substance Use Topics    Alcohol use: Not Currently     Comment: 2-10 drinks, stopped in september 24th, 2023, daily since 2020        Family History   Problem Relation Age of Onset    No Known Problems Mother     No Known Problems Father     No Known Problems Sister       No family history of colon cancer, Crohn's disease, or ulcerative colitis.    Review of Systems  Pertinent items are noted in HPI.       Physical Exam  Blood pressure (!) 105/45, pulse 100,

## 2024-01-17 NOTE — ED PROVIDER NOTES
EKG interpretation by me in absence of a face-to-face evaluation by me as follows:    The 12 lead EKG was interpreted by me independent of a cardiologist as follows:  Rate: tachycardia with a rate of 102  Rhythm: sinus  Axis: normal  Intervals: normal PA, narrow QRS, normal QTc  ST segments: no ST elevations or depressions  T waves: no abnormal inversions  Non-specific T wave changes: not present  Prior EKG comparison: No prior is currently available for comparison        Glenn Ballard MD  01/17/24 8541

## 2024-01-18 LAB
ALBUMIN SERPL-MCNC: 3.6 G/DL (ref 3.4–5)
ALP SERPL-CCNC: 140 U/L (ref 40–129)
ALT SERPL-CCNC: 27 U/L (ref 10–40)
AMMONIA PLAS-SCNC: 68 UMOL/L (ref 11–51)
ANION GAP SERPL CALCULATED.3IONS-SCNC: 10 MMOL/L (ref 3–16)
AST SERPL-CCNC: 61 U/L (ref 15–37)
BASOPHILS # BLD: 0.1 K/UL (ref 0–0.2)
BASOPHILS NFR BLD: 0.8 %
BILIRUB DIRECT SERPL-MCNC: 9.1 MG/DL (ref 0–0.3)
BILIRUB INDIRECT SERPL-MCNC: 11.2 MG/DL (ref 0–1)
BILIRUB SERPL-MCNC: 20.3 MG/DL (ref 0–1)
BILIRUB UR QL STRIP.AUTO: NEGATIVE
BUN SERPL-MCNC: 20 MG/DL (ref 7–20)
CALCIUM SERPL-MCNC: 8.8 MG/DL (ref 8.3–10.6)
CHLORIDE SERPL-SCNC: 90 MMOL/L (ref 99–110)
CLARITY UR: CLEAR
CO2 SERPL-SCNC: 21 MMOL/L (ref 21–32)
COLOR UR: YELLOW
CREAT SERPL-MCNC: <0.5 MG/DL (ref 0.6–1.1)
DEPRECATED RDW RBC AUTO: 18.2 % (ref 12.4–15.4)
EOSINOPHIL # BLD: 0.2 K/UL (ref 0–0.6)
EOSINOPHIL NFR BLD: 2.1 %
GFR SERPLBLD CREATININE-BSD FMLA CKD-EPI: >60 ML/MIN/{1.73_M2}
GLUCOSE SERPL-MCNC: 103 MG/DL (ref 70–99)
GLUCOSE UR STRIP.AUTO-MCNC: NEGATIVE MG/DL
HCT VFR BLD AUTO: 20.9 % (ref 36–48)
HGB BLD-MCNC: 7.3 G/DL (ref 12–16)
HGB UR QL STRIP.AUTO: NEGATIVE
KETONES UR STRIP.AUTO-MCNC: NEGATIVE MG/DL
LEUKOCYTE ESTERASE UR QL STRIP.AUTO: NEGATIVE
LYMPHOCYTES # BLD: 1.7 K/UL (ref 1–5.1)
LYMPHOCYTES NFR BLD: 15 %
MCH RBC QN AUTO: 38.5 PG (ref 26–34)
MCHC RBC AUTO-ENTMCNC: 35.1 G/DL (ref 31–36)
MCV RBC AUTO: 109.7 FL (ref 80–100)
MONOCYTES # BLD: 1 K/UL (ref 0–1.3)
MONOCYTES NFR BLD: 9.3 %
NEUTROPHILS # BLD: 8.1 K/UL (ref 1.7–7.7)
NEUTROPHILS NFR BLD: 72.8 %
NITRITE UR QL STRIP.AUTO: NEGATIVE
OSMOLALITY UR: 314 MOSM/KG (ref 390–1070)
PH UR STRIP.AUTO: 7 [PH] (ref 5–8)
PLATELET # BLD AUTO: 80 K/UL (ref 135–450)
PMV BLD AUTO: 6.4 FL (ref 5–10.5)
POTASSIUM SERPL-SCNC: 4.9 MMOL/L (ref 3.5–5.1)
POTASSIUM UR-SCNC: 29.5 MMOL/L
PROT SERPL-MCNC: 5.6 G/DL (ref 6.4–8.2)
PROT UR STRIP.AUTO-MCNC: NEGATIVE MG/DL
RBC # BLD AUTO: 1.91 M/UL (ref 4–5.2)
SODIUM SERPL-SCNC: 121 MMOL/L (ref 136–145)
SODIUM SERPL-SCNC: 122 MMOL/L (ref 136–145)
SODIUM UR-SCNC: 84 MMOL/L
SP GR UR STRIP.AUTO: <=1.005 (ref 1–1.03)
UA COMPLETE W REFLEX CULTURE PNL UR: NORMAL
UA DIPSTICK W REFLEX MICRO PNL UR: NORMAL
URN SPEC COLLECT METH UR: NORMAL
UROBILINOGEN UR STRIP-ACNC: 1 E.U./DL
WBC # BLD AUTO: 11.1 K/UL (ref 4–11)

## 2024-01-18 PROCEDURE — 2060000000 HC ICU INTERMEDIATE R&B

## 2024-01-18 PROCEDURE — 84300 ASSAY OF URINE SODIUM: CPT

## 2024-01-18 PROCEDURE — 6360000002 HC RX W HCPCS: Performed by: INTERNAL MEDICINE

## 2024-01-18 PROCEDURE — 84133 ASSAY OF URINE POTASSIUM: CPT

## 2024-01-18 PROCEDURE — 2580000003 HC RX 258: Performed by: INTERNAL MEDICINE

## 2024-01-18 PROCEDURE — 83935 ASSAY OF URINE OSMOLALITY: CPT

## 2024-01-18 PROCEDURE — 80076 HEPATIC FUNCTION PANEL: CPT

## 2024-01-18 PROCEDURE — 6370000000 HC RX 637 (ALT 250 FOR IP): Performed by: INTERNAL MEDICINE

## 2024-01-18 PROCEDURE — 89051 BODY FLUID CELL COUNT: CPT

## 2024-01-18 PROCEDURE — 85025 COMPLETE CBC W/AUTO DIFF WBC: CPT

## 2024-01-18 PROCEDURE — 80048 BASIC METABOLIC PNL TOTAL CA: CPT

## 2024-01-18 PROCEDURE — 36415 COLL VENOUS BLD VENIPUNCTURE: CPT

## 2024-01-18 PROCEDURE — 82140 ASSAY OF AMMONIA: CPT

## 2024-01-18 PROCEDURE — 84295 ASSAY OF SERUM SODIUM: CPT

## 2024-01-18 PROCEDURE — P9047 ALBUMIN (HUMAN), 25%, 50ML: HCPCS | Performed by: INTERNAL MEDICINE

## 2024-01-18 PROCEDURE — 6370000000 HC RX 637 (ALT 250 FOR IP): Performed by: PHYSICIAN ASSISTANT

## 2024-01-18 PROCEDURE — 81003 URINALYSIS AUTO W/O SCOPE: CPT

## 2024-01-18 RX ORDER — ALBUMIN (HUMAN) 12.5 G/50ML
25 SOLUTION INTRAVENOUS EVERY 8 HOURS
Status: COMPLETED | OUTPATIENT
Start: 2024-01-18 | End: 2024-01-19

## 2024-01-18 RX ORDER — MINERAL OIL AND WHITE PETROLATUM 150; 830 MG/G; MG/G
OINTMENT OPHTHALMIC PRN
Status: DISCONTINUED | OUTPATIENT
Start: 2024-01-18 | End: 2024-01-18

## 2024-01-18 RX ADMIN — FERROUS SULFATE TAB 325 MG (65 MG ELEMENTAL FE) 325 MG: 325 (65 FE) TAB at 08:14

## 2024-01-18 RX ADMIN — FUROSEMIDE 40 MG: 40 TABLET ORAL at 08:15

## 2024-01-18 RX ADMIN — ALBUMIN (HUMAN) 25 G: 0.25 INJECTION, SOLUTION INTRAVENOUS at 13:03

## 2024-01-18 RX ADMIN — CEFTRIAXONE SODIUM 1000 MG: 1 INJECTION, POWDER, FOR SOLUTION INTRAMUSCULAR; INTRAVENOUS at 17:26

## 2024-01-18 RX ADMIN — SODIUM CHLORIDE, PRESERVATIVE FREE 10 ML: 5 INJECTION INTRAVENOUS at 08:16

## 2024-01-18 RX ADMIN — ALBUMIN (HUMAN) 12.5 G: 0.25 INJECTION, SOLUTION INTRAVENOUS at 05:14

## 2024-01-18 RX ADMIN — PANTOPRAZOLE SODIUM 40 MG: 40 TABLET, DELAYED RELEASE ORAL at 05:15

## 2024-01-18 RX ADMIN — FUROSEMIDE 40 MG: 40 TABLET ORAL at 17:26

## 2024-01-18 RX ADMIN — SODIUM CHLORIDE, PRESERVATIVE FREE 10 ML: 5 INJECTION INTRAVENOUS at 21:35

## 2024-01-18 RX ADMIN — ALBUMIN (HUMAN) 25 G: 0.25 INJECTION, SOLUTION INTRAVENOUS at 21:42

## 2024-01-18 RX ADMIN — Medication 15 G: at 21:34

## 2024-01-18 RX ADMIN — FOLIC ACID 1 MG: 1 TABLET ORAL at 08:15

## 2024-01-18 RX ADMIN — Medication 15 G: at 08:14

## 2024-01-18 RX ADMIN — RIFAXIMIN 550 MG: 550 TABLET ORAL at 21:38

## 2024-01-18 RX ADMIN — CARVEDILOL 3.12 MG: 3.12 TABLET, FILM COATED ORAL at 08:15

## 2024-01-18 ASSESSMENT — PAIN SCALES - GENERAL
PAINLEVEL_OUTOF10: 0

## 2024-01-18 NOTE — PROGRESS NOTES
[facility] Note    Patient Active Problem List   Diagnosis    Dizziness    Hyponatremia    Decompensated hepatic cirrhosis (HCC)    Ascites due to alcoholic cirrhosis (HCC)    Anemia    S/P abdominal paracentesis    Esophageal varices (HCC)    GERD (gastroesophageal reflux disease)       Past Medical History:   has a past medical history of ADHD, Cirrhosis (HCC), Dyslexia, ETOH abuse, Hepatic cirrhosis (HCC), and History of blood transfusion.    Past Social History:   reports that she has never smoked. She has never used smokeless tobacco. She reports that she does not currently use alcohol. She reports that she does not use drugs.    Subjective:    No neurologic symptoms  Feels tired    Review of Systems   Constitutional:  Positive for fatigue. Negative for activity change, appetite change, chills, fever and unexpected weight change.   HENT:  Negative for congestion and facial swelling.    Eyes:  Negative for photophobia, discharge and redness.   Respiratory:  Negative for cough, chest tightness and shortness of breath.    Cardiovascular:  Positive for leg swelling. Negative for chest pain and palpitations.   Gastrointestinal:  Positive for abdominal distention. Negative for abdominal pain, blood in stool, constipation, diarrhea, nausea and vomiting.   Endocrine: Negative for cold intolerance, heat intolerance and polyuria.   Genitourinary:  Negative for decreased urine volume, difficulty urinating, flank pain and hematuria.   Musculoskeletal:  Negative for joint swelling and neck pain.   Neurological:  Negative for dizziness, seizures, syncope, speech difficulty, light-headedness and headaches.   Hematological:  Does not bruise/bleed easily.   Psychiatric/Behavioral:  Negative for agitation, confusion and hallucinations.        Objective:      BP 99/61   Pulse 80   Temp 98.1 °F (36.7 °C) (Oral)   Resp 16   Ht 1.651 m (5' 5\")   Wt 82.8 kg (182 lb 8 oz)   SpO2 100%   BMI 30.37  kg/m²     Wt Readings from Last 3 Encounters:   01/18/24 82.8 kg (182 lb 8 oz)   01/13/24 76.9 kg (169 lb 9.6 oz)   11/15/23 83.9 kg (184 lb 14.4 oz)       BP Readings from Last 3 Encounters:   01/18/24 99/61   01/17/24 (!) 105/59   01/13/24 (!) 95/55     HEENT-icteric sclerae  Chest- clear  Heart-regular  Abd-soft  Ext- 1+ edema    Labs  Hemoglobin   Date Value Ref Range Status   01/18/2024 7.3 (L) 12.0 - 16.0 g/dL Final     Hematocrit   Date Value Ref Range Status   01/18/2024 20.9 (LL) 36.0 - 48.0 % Final     WBC   Date Value Ref Range Status   01/18/2024 11.1 (H) 4.0 - 11.0 K/uL Final     Platelets   Date Value Ref Range Status   01/18/2024 80 (L) 135 - 450 K/uL Final     Comment:     Result consistent with patient history     Lab Results   Component Value Date    CREATININE <0.5 (L) 01/18/2024    BUN 20 01/18/2024     (L) 01/18/2024    K 4.9 01/18/2024    CL 90 (L) 01/18/2024    CO2 21 01/18/2024     Alb 3.6  UA s.g. <1.005, blood and protein neg  UPCR 200mg  Uosm pending      Assessment/Plan:   Acute on Chronic Hyponatremia-goal Na in the next 24 hours around 127-129.  Euvolemic.  Check sodium every 6 hours.  Positive nausea.  Also on SPLT.   Previous urine studies reflecting SIADH.   1.5L/24H fluid restriction.  Hold spironolactone for now.  Lasix 40 mg p.o. twice daily.  Urea 15 g twice daily x 4 doses.  Follow ammonia level.  Albumin 25g x 3 doses.    Relative Hypotension-albumin x 3 more doses   Anemia-follow Hgb closely.  Per Medicine   H/O Alcoholic Liver Cirrhosis-last alcohol use about 9/2023.   Hypoalbuminemia  H/O Ascites-follow for paracentesis need  Hyperkalemia-low potassium diet.  Lasix twice daily.  Better.     Tish Pollock MD

## 2024-01-18 NOTE — PROGRESS NOTES
Shift assessment completed. Routine vitals stable. Scheduled medications given. Patient is awake, alert and oriented. Respirations are easy and unlabored. Patient does not appear to be in distress, resting comfortably at this time. Call light within reach. Patient up as tolerated to use the restroom or just walk around. No distress noted at this time.     Patient has frequent nose bleeds, had one this morning while getting report from night shift nurse, stopped after a short period. Secure message sent to Dr Potter, ENT consult ordered.

## 2024-01-18 NOTE — PROGRESS NOTES
Gastroenterology Progress Note      Angélica Gonzalez is a 38 y.o. female patient.  1. Hyperkalemia    2. Hyponatremia    3. Ascites due to alcoholic cirrhosis (HCC)        SUBJECTIVE:  Abdomen feels bloated but no abdominal pain.        Physical    VITALS:  BP 99/61   Pulse 80   Temp 98.1 °F (36.7 °C) (Oral)   Resp 16   Ht 1.651 m (5' 5\")   Wt 82.8 kg (182 lb 8 oz)   SpO2 100%   BMI 30.37 kg/m²   TEMPERATURE:  Current - Temp: 98.1 °F (36.7 °C); Max - Temp  Av.9 °F (36.6 °C)  Min: 97.6 °F (36.4 °C)  Max: 98.1 °F (36.7 °C)    NAD  Jaundice  Abdomen distended but not taut, NT, no HSM, Bowel sounds normal  AAOx3, No asterixis     Data    Data Review:    Recent Labs     24  1013 24  1432 24  0517   WBC 11.3* 14.7* 11.1*   HGB 8.4* 8.1* 7.3*   HCT 23.2* 22.7* 20.9*   .9* 109.4* 109.7*   PLT 76* 90* 80*     Recent Labs     24  1013 24  1432 24  0517 24  1332   * 123* 121* 122*   K 4.9 5.3* 4.9  --    CL 86* 90* 90*  --    CO2 22 22 21  --    PHOS 4.2  --   --   --    BUN 35* 32* 20  --    CREATININE <0.5* <0.5* <0.5*  --      Recent Labs     24  1013 24  1432 24  0517   AST 65* 67* 61*   ALT 26 28 27   BILIDIR 8.4* 9.6* 9.1*   BILITOT 21.1* 22.8* 20.3*   ALKPHOS 155* 153* 140*     No results for input(s): \"LIPASE\", \"AMYLASE\" in the last 72 hours.  Recent Labs     24  1526   PROTIME 29.0*   INR 2.76*     No results for input(s): \"PTT\" in the last 72 hours.           ASSESSMENT :    Cirrhosis due to alcohol -complicated mainly by Ascites that has not responded to diuretics thus far and now complicated by recurrent significant Hyponatremia.  The additional hyperkalemia may indicate spironolactone as a potential contributing factor especially since the dose was increased recently.  Nephrology service is following and they agree with holding spironolactone for now and continuing furosemide.  They will consider adding urea

## 2024-01-18 NOTE — CARE COORDINATION
01/18/24 1408   Readmission Assessment   Number of Days since last admission? 1-7 days   Previous Disposition Home with Family   Who is being Interviewed Patient   What was the patient's/caregiver's perception as to why they think they needed to return back to the hospital? Other (Comment)  (N/A)   Did you visit your Primary Care Physician after you left the hospital, before you returned this time? No   Why weren't you able to visit your PCP? Did not have an appointment   Did you see a specialist, such as Cardiac, Pulmonary, Orthopedic Physician, etc. after you left the hospital? No   Who advised the patient to return to the hospital? Self-referral   Does the patient report anything that got in the way of taking their medications? No   In our efforts to provide the best possible care to you and others like you, can you think of anything that we could have done to help you after you left the hospital the first time, so that you might not have needed to return so soon? Other (Comment)  (N/A)         Electronically signed by MARYANN Bartlett on 1/18/2024 at 2:11 PM

## 2024-01-18 NOTE — PROGRESS NOTES
Progress Note - Dr. Potter - Internal Medicine  PCP: Dee Dee Pinon [address] [facility] [address] [phone]    Hospital Day: 1  Code Status: Full Code  Current Diet: ADULT ORAL NUTRITION SUPPLEMENT; Lunch; Standard High Calorie/High Protein Oral Supplement  ADULT DIET; Regular; 1500 ml        CC: follow up on medical issues    Subjective:   Angélica Gonzalez is a 38 y.o. female.    Pt seen and examined  Chart reviewed since last visit, labs and imaging below      Had epistaxis over night  ENT asked to see    Na 121      Review of Systems: (1 system for EPF, 2-9 for detailed, 10+ for comprehensive)  Constitutional: Negative for chills and fever.     HENT: Negative for dental problem, and rhinorrhea.    Positive for epistaxis  Eyes: Negative for photophobia and visual disturbance.     Respiratory: Negative for cough, chest tightness and shortness of breath.      Cardiovascular: Negative for chest pain and leg swelling.     Gastrointestinal: Negative for diarrhea, positive for nausea and vomiting.     Endocrine: Negative for polydipsia and polyphagia.     Genitourinary: Negative for frequency, hematuria and urgency.     Musculoskeletal: Negative for back pain and myalgias.     Skin: Negative for rash.   Positive for jaundice  Allergic/Immunologic: Negative for food allergies.     Neurological: Negative for dizziness, seizures, syncope and facial asymmetry.     Hematological: Negative for adenopathy.     Psychiatric/Behavioral: Negative for dysphoric mood. The patient is not nervous/anxious.        I have reviewed the patient's medical and social history in detail and updated the computerized patient record.  To recap: She  has a past medical history of ADHD, Cirrhosis (HCC), Dyslexia, ETOH abuse, Hepatic cirrhosis (HCC), and History of blood transfusion.. She  has a past surgical history that includes Paracentesis (10/14/2023) and Upper gastrointestinal endoscopy (N/A, 11/15/2023).. She  reports  need testing?  If yes, please place LAB Orders.->Yes Is the procedure for Diagnostic or Therapeutic reasons?->Therapeutic Is the procedure for Diagnostic or Therapeutic reasons?->Diagnostic TECHNIQUE: Informed consent was obtained after a detailed explanation of the procedure including risks, benefits, and alternatives.  Universal protocol was followed.  A limited ultrasound of the abdomen was performed. The right abdomen was prepped and draped in sterile fashion and local anesthesia was achieved with lidocaine.  A 5 [place] needle sheath was advanced into ascites and paracentesis was performed.  The patient tolerated the procedure well. Estimated blood loss: Less than 5 cc FINDINGS: Limited ultrasound of the abdomen demonstrates ascites. A total of 7.5 L was removed.     Successful paracentesis.     IR US GUIDED PARACENTESIS    Result Date: 12/20/2023  PROCEDURE: PARACENTESIS WITHOUT IMAGE GUIDANCE US ABDOMEN LIMITED 12/20/2023 HISTORY: ORDERING SYSTEM PROVIDED HISTORY: Other ascites TECHNOLOGIST PROVIDED HISTORY: Does fluid need testing?  If yes, please place LAB Orders.->Yes Is the procedure for Diagnostic or Therapeutic reasons?->Therapeutic Is the procedure for Diagnostic or Therapeutic reasons?->Diagnostic TECHNIQUE: Informed consent was obtained after a detailed explanation of the procedure including risks, benefits, and alternatives.  Universal protocol was followed.  A limited ultrasound of the abdomen was performed. The right abdomen was prepped and draped in sterile fashion and local anesthesia was achieved with lidocaine.  A 5 [place] needle sheath was advanced into ascites and paracentesis was performed.  The patient tolerated the procedure well. Estimated blood loss: Less than 5 cc FINDINGS: Limited ultrasound of the abdomen demonstrates ascites. A total of 5.1 L was removed.     Successful paracentesis.       Lab Results   Component Value Date/Time    GLUCOSE 103 01/18/2024 05:17 AM     No results found

## 2024-01-18 NOTE — ED NOTES
ED TO INPATIENT SBAR HANDOFF    Patient Name: Angélica Gonzalez   :  1985  38 y.o.   MRN:  [id number]  Preferred Name    ED Room #:  ED-0011/11  Family/Caregiver Present yes   Restraints no   Sitter no   Sepsis Risk Score Sepsis Risk Score: 2.01    Situation  Code Status: Full Code No additional code details.    Allergies: Patient has no known allergies.  Weight: Patient Vitals for the past 96 hrs (Last 3 readings):   Weight   24 1415 82.8 kg (182 lb 8 oz)     Arrived from: home  Chief Complaint:   Chief Complaint   Patient presents with    Jaundice     Pt via self from home, pt hx of liver cirrhosis, pt is jaundiced and had abnormal labs (Na+), had paracentesis on  and      Hospital Problem/Diagnosis:  Principal Problem:    Hyponatremia  Active Problems:    Decompensated hepatic cirrhosis (HCC)    Ascites due to alcoholic cirrhosis (HCC)    Anemia    GERD (gastroesophageal reflux disease)  Resolved Problems:    * No resolved hospital problems. *    Imaging:   XR CHEST PORTABLE   Preliminary Result   Low lung volumes without evidence of acute cardiopulmonary process.           Abnormal labs:   Abnormal Labs Reviewed   CBC WITH AUTO DIFFERENTIAL - Abnormal; Notable for the following components:       Result Value    WBC 14.7 (*)     RBC 2.08 (*)     Hemoglobin 8.1 (*)     Hematocrit 22.7 (*)     .4 (*)     MCH 38.8 (*)     RDW 18.8 (*)     Platelets 90 (*)     Neutrophils Absolute 11.3 (*)     Monocytes Absolute 1.5 (*)     All other components within normal limits   BASIC METABOLIC PANEL - Abnormal; Notable for the following components:    Sodium 123 (*)     Potassium 5.3 (*)     Chloride 90 (*)     Glucose 109 (*)     BUN 32 (*)     Creatinine <0.5 (*)     All other components within normal limits   HEPATIC FUNCTION PANEL - Abnormal; Notable for the following components:    Total Protein 5.9 (*)     Alkaline Phosphatase 153 (*)     AST 67 (*)     Total Bilirubin 22.8 (*)     Bilirubin,

## 2024-01-18 NOTE — PLAN OF CARE
Problem: Discharge Planning  Goal: Discharge to home or other facility with appropriate resources  Outcome: Progressing     Problem: Gastrointestinal - Adult  Goal: Minimal or absence of nausea and vomiting  Outcome: Progressing  Goal: Maintains adequate nutritional intake  Outcome: Progressing     Problem: Metabolic/Fluid and Electrolytes - Adult  Goal: Electrolytes maintained within normal limits  Outcome: Progressing  Goal: Hemodynamic stability and optimal renal function maintained  Outcome: Progressing

## 2024-01-18 NOTE — PLAN OF CARE
Problem: Discharge Planning  Goal: Discharge to home or other facility with appropriate resources  1/18/2024 0839 by Leilani Philip RN  Outcome: Progressing  Flowsheets (Taken 1/18/2024 0757)  Discharge to home or other facility with appropriate resources: Identify barriers to discharge with patient and caregiver  1/17/2024 2345 by Roz Pool RN  Outcome: Progressing     Problem: Gastrointestinal - Adult  Goal: Minimal or absence of nausea and vomiting  1/18/2024 0839 by Leilani Philip RN  Outcome: Progressing  1/17/2024 2345 by Roz Pool RN  Outcome: Progressing  Goal: Maintains adequate nutritional intake  1/18/2024 0839 by Leilani Philip RN  Outcome: Progressing  1/17/2024 2345 by Roz Pool RN  Outcome: Progressing     Problem: Metabolic/Fluid and Electrolytes - Adult  Goal: Electrolytes maintained within normal limits  1/18/2024 0839 by Leilani Philip RN  Outcome: Progressing  Flowsheets (Taken 1/18/2024 0757)  Electrolytes maintained within normal limits: Monitor labs and assess patient for signs and symptoms of electrolyte imbalances  1/17/2024 2345 by Roz Pool RN  Outcome: Progressing  Goal: Hemodynamic stability and optimal renal function maintained  1/18/2024 0839 by Leilani Philip RN  Outcome: Progressing  Flowsheets (Taken 1/18/2024 0757)  Hemodynamic stability and optimal renal function maintained: Monitor labs and assess for signs and symptoms of volume excess or deficit  1/17/2024 2345 by Roz Pool RN  Outcome: Progressing

## 2024-01-18 NOTE — PROGRESS NOTES
Nutrition Note    RECOMMENDATIONS  PO Diet: Low Na, 1500 ml/day fluid restriction  ONS: Ensure compact TID  Nutrition Support: N/A     NUTRITION ASSESSMENT   Nutrition intervention triggered for poor appetite & wt loss.  Pt reports feels hungry but fills up after just a few bites.  Wts have fluctuated between 182-206 lb in past 2 months related to paracentesis vs poor po intake.  Agrees to try Ensure compact to promote adequate po intake while adhering to fluid restriction.  Will monitor for adequate po/ supplement intake.  Also reviewed low Na diet guidelines.     Nutrition Related Findings: LBM 1/18; jaundice; trace edema BLE; Na 121; k+ 4.9; elevated NH3.  Wounds: None  Nutrition Education:  Education completed (low Na)   Nutrition Goals: PO intake 50% or greater     MALNUTRITION ASSESSMENT   Acute Illness  Malnutrition Status: No malnutrition    NUTRITION DIAGNOSIS   Inadequate oral intake related to inadequate protein-energy intake as evidenced by intake 0-25%, poor intake prior to admission, other (comment) (ascites)      CURRENT NUTRITION THERAPIES  ADULT DIET; Regular; Low Sodium (2 gm); 1500 ml  ADULT ORAL NUTRITION SUPPLEMENT; Breakfast, Lunch, Dinner; Standard 4 oz Oral Supplement     PO Intake: 1-25%   PO Supplement Intake:None Ordered    ANTHROPOMETRICS  Current Height: 165.1 cm (5' 5\")  Current Weight - Scale: 82.8 kg (182 lb 8 oz)    Ideal Body Weight (IBW): 125 lbs  (57 kg)      BMI: 30.4      COMPARATIVE STANDARDS  Total Energy Requirements (kcals/day): 1242- 1490     Protein (g):  68- 114g       Fluid (mL/day):  1 ml/kcal    The patient will be monitored per nutrition standards of care. Consult dietitian if additional nutrition interventions are needed prior to RD reassessment.     Lizet Vinson, JIMBO, LD    Contact: [phone]

## 2024-01-18 NOTE — ED PROVIDER NOTES
[facility] Emergency Department      Pt Name: Angélica Gonzalez  MRN: [id number]  Birthdate 1985  Date of evaluation: 1/17/2024  Provider: BECCA REDMOND MD  I personally saw Angélica Gonzalez and made and approved the management plan with the advanced practice provider.  I take responsibility for the patient management.     HPI: Angélica Gonzalez presented with   Chief Complaint   Patient presents with    Jaundice     Pt via self from home, pt hx of liver cirrhosis, pt is jaundiced and had abnormal labs (Na+), had paracentesis on 1/5 and 1/9     Angélica Gonzalez has a past medical history of ADHD, Cirrhosis (HCC), Dyslexia, ETOH abuse, Hepatic cirrhosis (HCC), and History of blood transfusion.  She has a past surgical history that includes Paracentesis (10/14/2023) and Upper gastrointestinal endoscopy (N/A, 11/15/2023).    No current facility-administered medications on file prior to encounter.     Current Outpatient Medications on File Prior to Encounter   Medication Sig Dispense Refill    omeprazole (PRILOSEC) 20 MG delayed release capsule Take 1 capsule by mouth every morning (before breakfast) 90 capsule 1    furosemide (LASIX) 20 MG tablet Take 1 tablet by mouth 2 times daily 60 tablet 3    spironolactone (ALDACTONE) 50 MG tablet Take 1 tablet by mouth daily 30 tablet 3    urea (URE-NA) 15 g PACK packet Take 15 g by mouth daily for 7 days 7 each 0    carvedilol (COREG) 3.125 MG tablet Take 1 tablet by mouth 2 times daily (with meals) DO NOT START UNTIL SEEN BY PCP IN 1 WEEK AND YOUR BP IS STABLE. 30 tablet 0    ferrous sulfate (IRON 325) 325 (65 Fe) MG tablet Take 1 tablet by mouth daily (with breakfast) 30 tablet 5    amphetamine-dextroamphetamine (ADDERALL) 15 MG tablet Take 1 tablet by mouth daily.  at 5pm      levonorgestrel (MIRENA) IUD 52 mg 1 each by IntraUTERine route once      folic acid (FOLVITE) 1 MG tablet Take 1 tablet by mouth daily      traMADol (ULTRAM) 50 MG tablet Take

## 2024-01-18 NOTE — CARE COORDINATION
Case Management Assessment  Initial Evaluation    Date/Time of Evaluation: 1/18/2024 2:26 PM  Assessment Completed by: MARYANN Bartlett    If patient is discharged prior to next notation, then this note serves as note for discharge by case management.    Patient Name: Angélica Gonzalez                   YOB: 1985  Diagnosis: Hyperkalemia [E87.5]  Hyponatremia [E87.1]  Ascites due to alcoholic cirrhosis (HCC) [K70.31]                   Date / Time: 1/17/2024  2:09 PM    Patient Admission Status: Inpatient   Readmission Risk (Low < 19, Mod (19-27), High > 27): Readmission Risk Score: 20.5    Current PCP: Dee Dee Pinon  PCP verified by CM? Yes    Chart Reviewed: Yes      History Provided by: Patient  Patient Orientation: Alert and Oriented    Patient Cognition: Alert    Hospitalization in the last 30 days (Readmission):  Yes    If yes, Readmission Assessment in CM Navigator will be completed.    Advance Directives:      Code Status: Full Code   Patient's Primary Decision Maker is: Named in Scanned ACP Document      Discharge Planning:    Patient lives with: Alone Type of Home: House  Primary Care Giver: Self  Patient Support Systems include: Family Members, Parent   Current Financial resources: Other (Comment) (Linda CORTEZ)  Current community resources: None  Current services prior to admission: None            Current DME:              Type of Home Care services:  None    ADLS  Prior functional level: Independent in ADLs/IADLs  Current functional level: Independent in ADLs/IADLs    PT AM-PAC:   /24  OT AM-PAC:   /24    Family can provide assistance at DC: Yes  Would you like Case Management to discuss the discharge plan with any other family members/significant others, and if so, who? Yes (Parents)  Plans to Return to Present Housing: Yes  Other Identified Issues/Barriers to RETURNING to current housing: Yes  Potential Assistance needed at discharge: N/A            Potential DME:

## 2024-01-18 NOTE — PROGRESS NOTES
Na+ 122, reported to Dr Potter per instructions, awaiting any new orders if needed  Secure message Dr Montaño with nephro and no new orders at this time.

## 2024-01-19 ENCOUNTER — APPOINTMENT (OUTPATIENT)
Dept: INTERVENTIONAL RADIOLOGY/VASCULAR | Age: 39
DRG: 641 | End: 2024-01-19
Payer: COMMERCIAL

## 2024-01-19 LAB
ABO + RH BLD: NORMAL
ALBUMIN SERPL-MCNC: 3.6 G/DL (ref 3.4–5)
ALP SERPL-CCNC: 134 U/L (ref 40–129)
ALT SERPL-CCNC: 21 U/L (ref 10–40)
AMMONIA PLAS-SCNC: 79 UMOL/L (ref 11–51)
ANION GAP SERPL CALCULATED.3IONS-SCNC: 9 MMOL/L (ref 3–16)
APPEARANCE FLUID: NORMAL
AST SERPL-CCNC: 47 U/L (ref 15–37)
BASOPHILS # BLD: 0 K/UL (ref 0–0.2)
BASOPHILS NFR BLD: 0.4 %
BDY FLUID QUALITY: NORMAL
BILIRUB DIRECT SERPL-MCNC: 6.9 MG/DL (ref 0–0.3)
BILIRUB INDIRECT SERPL-MCNC: 8.6 MG/DL (ref 0–1)
BILIRUB SERPL-MCNC: 15.5 MG/DL (ref 0–1)
BLD GP AB SCN SERPL QL: NORMAL
BLOOD BANK DISPENSE STATUS: NORMAL
BLOOD BANK PRODUCT CODE: NORMAL
BPU ID: NORMAL
BUN SERPL-MCNC: 27 MG/DL (ref 7–20)
CALCIUM SERPL-MCNC: 8.6 MG/DL (ref 8.3–10.6)
CELL COUNT FLUID TYPE: NORMAL
CHLORIDE SERPL-SCNC: 92 MMOL/L (ref 99–110)
CO2 SERPL-SCNC: 23 MMOL/L (ref 21–32)
COLOR FLUID: YELLOW
CREAT SERPL-MCNC: <0.5 MG/DL (ref 0.6–1.1)
DEPRECATED RDW RBC AUTO: 18.4 % (ref 12.4–15.4)
DESCRIPTION BLOOD BANK: NORMAL
EOSINOPHIL # BLD: 0.3 K/UL (ref 0–0.6)
EOSINOPHIL NFR BLD: 3.4 %
EOSINOPHIL NFR FLD MANUAL: 0 %
GFR SERPLBLD CREATININE-BSD FMLA CKD-EPI: >60 ML/MIN/{1.73_M2}
GLUCOSE SERPL-MCNC: 103 MG/DL (ref 70–99)
HCT VFR BLD AUTO: 17.1 % (ref 36–48)
HCT VFR BLD AUTO: 20.3 % (ref 36–48)
HGB BLD-MCNC: 6.3 G/DL (ref 12–16)
HGB BLD-MCNC: 7.3 G/DL (ref 12–16)
LYMPHOCYTES # BLD: 1.2 K/UL (ref 1–5.1)
LYMPHOCYTES NFR BLD: 14.5 %
LYMPHOCYTES NFR FLD: 31 %
MACROPHAGES # FLD: 54 %
MCH RBC QN AUTO: 39.8 PG (ref 26–34)
MCHC RBC AUTO-ENTMCNC: 36.6 G/DL (ref 31–36)
MCV RBC AUTO: 108.8 FL (ref 80–100)
MESOTHL CELL NFR FLD: 6 %
MONOCYTES # BLD: 0.8 K/UL (ref 0–1.3)
MONOCYTES NFR BLD: 9.8 %
MONONUCLEAR UNIDENTIFIED CELLS FLUID: 3 %
NEUTROPHIL, FLUID: 6 %
NEUTROPHILS # BLD: 5.8 K/UL (ref 1.7–7.7)
NEUTROPHILS NFR BLD: 71.9 %
NUC CELL # FLD: 240 /CUMM
PATH REV: YES
PLATELET # BLD AUTO: 63 K/UL (ref 135–450)
PMV BLD AUTO: 6.4 FL (ref 5–10.5)
POTASSIUM SERPL-SCNC: 4.7 MMOL/L (ref 3.5–5.1)
PROT SERPL-MCNC: 5.2 G/DL (ref 6.4–8.2)
RBC # BLD AUTO: 1.57 M/UL (ref 4–5.2)
RBC FLUID: 1000 /CUMM
SODIUM SERPL-SCNC: 121 MMOL/L (ref 136–145)
SODIUM SERPL-SCNC: 124 MMOL/L (ref 136–145)
TOTAL CELLS COUNTED FLD: 100
WBC # BLD AUTO: 8.1 K/UL (ref 4–11)

## 2024-01-19 PROCEDURE — 80048 BASIC METABOLIC PNL TOTAL CA: CPT

## 2024-01-19 PROCEDURE — P9047 ALBUMIN (HUMAN), 25%, 50ML: HCPCS | Performed by: INTERNAL MEDICINE

## 2024-01-19 PROCEDURE — 2580000003 HC RX 258: Performed by: INTERNAL MEDICINE

## 2024-01-19 PROCEDURE — 6370000000 HC RX 637 (ALT 250 FOR IP): Performed by: INTERNAL MEDICINE

## 2024-01-19 PROCEDURE — 84295 ASSAY OF SERUM SODIUM: CPT

## 2024-01-19 PROCEDURE — 85018 HEMOGLOBIN: CPT

## 2024-01-19 PROCEDURE — 82140 ASSAY OF AMMONIA: CPT

## 2024-01-19 PROCEDURE — 87070 CULTURE OTHR SPECIMN AEROBIC: CPT

## 2024-01-19 PROCEDURE — 6370000000 HC RX 637 (ALT 250 FOR IP): Performed by: PHYSICIAN ASSISTANT

## 2024-01-19 PROCEDURE — 36430 TRANSFUSION BLD/BLD COMPNT: CPT

## 2024-01-19 PROCEDURE — 2060000000 HC ICU INTERMEDIATE R&B

## 2024-01-19 PROCEDURE — 0W9G3ZZ DRAINAGE OF PERITONEAL CAVITY, PERCUTANEOUS APPROACH: ICD-10-PCS | Performed by: STUDENT IN AN ORGANIZED HEALTH CARE EDUCATION/TRAINING PROGRAM

## 2024-01-19 PROCEDURE — C1729 CATH, DRAINAGE: HCPCS

## 2024-01-19 PROCEDURE — 2500000003 HC RX 250 WO HCPCS: Performed by: INTERNAL MEDICINE

## 2024-01-19 PROCEDURE — 85014 HEMATOCRIT: CPT

## 2024-01-19 PROCEDURE — 86850 RBC ANTIBODY SCREEN: CPT

## 2024-01-19 PROCEDURE — 6370000000 HC RX 637 (ALT 250 FOR IP): Performed by: OTOLARYNGOLOGY

## 2024-01-19 PROCEDURE — 49083 ABD PARACENTESIS W/IMAGING: CPT

## 2024-01-19 PROCEDURE — 85025 COMPLETE CBC W/AUTO DIFF WBC: CPT

## 2024-01-19 PROCEDURE — 6360000002 HC RX W HCPCS: Performed by: INTERNAL MEDICINE

## 2024-01-19 PROCEDURE — 36415 COLL VENOUS BLD VENIPUNCTURE: CPT

## 2024-01-19 PROCEDURE — P9016 RBC LEUKOCYTES REDUCED: HCPCS

## 2024-01-19 PROCEDURE — 88305 TISSUE EXAM BY PATHOLOGIST: CPT

## 2024-01-19 PROCEDURE — 86901 BLOOD TYPING SEROLOGIC RH(D): CPT

## 2024-01-19 PROCEDURE — 87075 CULTR BACTERIA EXCEPT BLOOD: CPT

## 2024-01-19 PROCEDURE — 30233N1 TRANSFUSION OF NONAUTOLOGOUS RED BLOOD CELLS INTO PERIPHERAL VEIN, PERCUTANEOUS APPROACH: ICD-10-PCS | Performed by: STUDENT IN AN ORGANIZED HEALTH CARE EDUCATION/TRAINING PROGRAM

## 2024-01-19 PROCEDURE — 87205 SMEAR GRAM STAIN: CPT

## 2024-01-19 PROCEDURE — 99222 1ST HOSP IP/OBS MODERATE 55: CPT | Performed by: OTOLARYNGOLOGY

## 2024-01-19 PROCEDURE — 86900 BLOOD TYPING SEROLOGIC ABO: CPT

## 2024-01-19 PROCEDURE — 80076 HEPATIC FUNCTION PANEL: CPT

## 2024-01-19 PROCEDURE — 88112 CYTOPATH CELL ENHANCE TECH: CPT

## 2024-01-19 PROCEDURE — 86923 COMPATIBILITY TEST ELECTRIC: CPT

## 2024-01-19 RX ORDER — MIDODRINE HYDROCHLORIDE 5 MG/1
5 TABLET ORAL
Status: DISCONTINUED | OUTPATIENT
Start: 2024-01-19 | End: 2024-01-23 | Stop reason: HOSPADM

## 2024-01-19 RX ORDER — OXYMETAZOLINE HYDROCHLORIDE 0.05 G/100ML
2 SPRAY NASAL EVERY 8 HOURS
Status: COMPLETED | OUTPATIENT
Start: 2024-01-19 | End: 2024-01-22

## 2024-01-19 RX ORDER — DEXTROAMPHETAMINE SACCHARATE, AMPHETAMINE ASPARTATE MONOHYDRATE, DEXTROAMPHETAMINE SULFATE AND AMPHETAMINE SULFATE 5; 5; 5; 5 MG/1; MG/1; MG/1; MG/1
20 CAPSULE, EXTENDED RELEASE ORAL EVERY MORNING
Status: DISCONTINUED | OUTPATIENT
Start: 2024-01-19 | End: 2024-01-19

## 2024-01-19 RX ORDER — OXYMETAZOLINE HYDROCHLORIDE 0.05 G/100ML
2 SPRAY NASAL 2 TIMES DAILY
Status: DISCONTINUED | OUTPATIENT
Start: 2024-01-22 | End: 2024-01-23 | Stop reason: HOSPADM

## 2024-01-19 RX ORDER — SODIUM CHLORIDE 9 MG/ML
INJECTION, SOLUTION INTRAVENOUS PRN
Status: DISCONTINUED | OUTPATIENT
Start: 2024-01-19 | End: 2024-01-23 | Stop reason: HOSPADM

## 2024-01-19 RX ORDER — DEXTROAMPHETAMINE SACCHARATE, AMPHETAMINE ASPARTATE MONOHYDRATE, DEXTROAMPHETAMINE SULFATE AND AMPHETAMINE SULFATE 2.5; 2.5; 2.5; 2.5 MG/1; MG/1; MG/1; MG/1
20 CAPSULE, EXTENDED RELEASE ORAL DAILY
Status: DISCONTINUED | OUTPATIENT
Start: 2024-01-19 | End: 2024-01-23 | Stop reason: HOSPADM

## 2024-01-19 RX ORDER — LIDOCAINE HYDROCHLORIDE 10 MG/ML
10 INJECTION, SOLUTION EPIDURAL; INFILTRATION; INTRACAUDAL; PERINEURAL ONCE
Status: COMPLETED | OUTPATIENT
Start: 2024-01-19 | End: 2024-01-19

## 2024-01-19 RX ORDER — BACITRACIN ZINC AND POLYMYXIN B SULFATE 500; 1000 [USP'U]/G; [USP'U]/G
OINTMENT TOPICAL DAILY
Status: DISCONTINUED | OUTPATIENT
Start: 2024-01-19 | End: 2024-01-23 | Stop reason: HOSPADM

## 2024-01-19 RX ORDER — ALBUMIN (HUMAN) 12.5 G/50ML
25 SOLUTION INTRAVENOUS EVERY 8 HOURS
Status: COMPLETED | OUTPATIENT
Start: 2024-01-19 | End: 2024-01-20

## 2024-01-19 RX ADMIN — BACITRACIN ZINC AND POLYMYXIN B SULFATE 28.3 G: at 21:37

## 2024-01-19 RX ADMIN — LIDOCAINE HYDROCHLORIDE 10 ML: 10 INJECTION, SOLUTION EPIDURAL; INFILTRATION; INTRACAUDAL; PERINEURAL at 11:24

## 2024-01-19 RX ADMIN — CARVEDILOL 3.12 MG: 3.12 TABLET, FILM COATED ORAL at 17:03

## 2024-01-19 RX ADMIN — SODIUM CHLORIDE, PRESERVATIVE FREE 10 ML: 5 INJECTION INTRAVENOUS at 09:35

## 2024-01-19 RX ADMIN — FUROSEMIDE 40 MG: 40 TABLET ORAL at 09:35

## 2024-01-19 RX ADMIN — Medication 2 SPRAY: at 17:05

## 2024-01-19 RX ADMIN — Medication 2 SPRAY: at 21:37

## 2024-01-19 RX ADMIN — CEFTRIAXONE SODIUM 1000 MG: 1 INJECTION, POWDER, FOR SOLUTION INTRAMUSCULAR; INTRAVENOUS at 17:22

## 2024-01-19 RX ADMIN — MIDODRINE HYDROCHLORIDE 5 MG: 5 TABLET ORAL at 11:58

## 2024-01-19 RX ADMIN — Medication 2 SPRAY: at 17:03

## 2024-01-19 RX ADMIN — FUROSEMIDE 40 MG: 40 TABLET ORAL at 17:03

## 2024-01-19 RX ADMIN — RIFAXIMIN 550 MG: 550 TABLET ORAL at 21:43

## 2024-01-19 RX ADMIN — ALBUMIN (HUMAN) 25 G: 0.25 INJECTION, SOLUTION INTRAVENOUS at 12:29

## 2024-01-19 RX ADMIN — RIFAXIMIN 550 MG: 550 TABLET ORAL at 11:58

## 2024-01-19 RX ADMIN — SODIUM CHLORIDE, PRESERVATIVE FREE 10 ML: 5 INJECTION INTRAVENOUS at 21:39

## 2024-01-19 RX ADMIN — DEXTROAMPHETAMINE SACCHARATE, AMPHETAMINE ASPARTATE MONOHYDRATE, DEXTROAMPHETAMINE SULFATE AND AMPHETAMINE SULFATE 20 MG: 2.5; 2.5; 2.5; 2.5 CAPSULE, EXTENDED RELEASE ORAL at 11:58

## 2024-01-19 RX ADMIN — ALBUMIN (HUMAN) 12.5 G: 0.25 INJECTION, SOLUTION INTRAVENOUS at 05:18

## 2024-01-19 RX ADMIN — FERROUS SULFATE TAB 325 MG (65 MG ELEMENTAL FE) 325 MG: 325 (65 FE) TAB at 09:35

## 2024-01-19 RX ADMIN — PANTOPRAZOLE SODIUM 40 MG: 40 TABLET, DELAYED RELEASE ORAL at 05:18

## 2024-01-19 RX ADMIN — MIDODRINE HYDROCHLORIDE 5 MG: 5 TABLET ORAL at 17:03

## 2024-01-19 RX ADMIN — Medication 2 SPRAY: at 23:29

## 2024-01-19 RX ADMIN — CARVEDILOL 3.12 MG: 3.12 TABLET, FILM COATED ORAL at 09:35

## 2024-01-19 RX ADMIN — ALBUMIN (HUMAN) 25 G: 0.25 INJECTION, SOLUTION INTRAVENOUS at 21:36

## 2024-01-19 RX ADMIN — Medication 15 G: at 09:35

## 2024-01-19 RX ADMIN — Medication 2 SPRAY: at 15:42

## 2024-01-19 RX ADMIN — FOLIC ACID 1 MG: 1 TABLET ORAL at 09:35

## 2024-01-19 RX ADMIN — ALBUMIN (HUMAN) 25 G: 0.25 INJECTION, SOLUTION INTRAVENOUS at 06:29

## 2024-01-19 ASSESSMENT — PAIN SCALES - GENERAL
PAINLEVEL_OUTOF10: 0
PAINLEVEL_OUTOF10: 0

## 2024-01-19 NOTE — PROGRESS NOTES
[facility] Note    Patient Active Problem List   Diagnosis    Dizziness    Hyponatremia    Decompensated hepatic cirrhosis (HCC)    Ascites due to alcoholic cirrhosis (HCC)    Anemia    S/P abdominal paracentesis    Esophageal varices (HCC)    GERD (gastroesophageal reflux disease)       Past Medical History:   has a past medical history of ADHD, Cirrhosis (HCC), Dyslexia, ETOH abuse, Hepatic cirrhosis (HCC), and History of blood transfusion.    Past Social History:   reports that she has never smoked. She has never used smokeless tobacco. She reports that she does not currently use alcohol. She reports that she does not use drugs.    Subjective:    No neurologic symptoms  Getting prbc transfusion  Feels tired    Review of Systems   Constitutional:  Positive for fatigue. Negative for activity change, appetite change, chills, fever and unexpected weight change.   HENT:  Negative for congestion and facial swelling.    Eyes:  Negative for photophobia, discharge and redness.   Respiratory:  Negative for cough, chest tightness and shortness of breath.    Cardiovascular:  Positive for leg swelling. Negative for chest pain and palpitations.   Gastrointestinal:  Positive for abdominal distention. Negative for abdominal pain, blood in stool, constipation, diarrhea, nausea and vomiting.   Endocrine: Negative for cold intolerance, heat intolerance and polyuria.   Genitourinary:  Negative for decreased urine volume, difficulty urinating, flank pain and hematuria.   Musculoskeletal:  Negative for joint swelling and neck pain.   Neurological:  Negative for dizziness, seizures, syncope, speech difficulty, light-headedness and headaches.   Hematological:  Does not bruise/bleed easily.   Psychiatric/Behavioral:  Negative for agitation, confusion and hallucinations.        Objective:      BP (!) 101/52   Pulse 84   Temp 98.4 °F (36.9 °C) (Oral)   Resp 18   Ht 1.651 m (5' 5\")   Wt 82.8 kg (182 lb 8

## 2024-01-19 NOTE — PROGRESS NOTES
Hgb = 6.3; Hct = 17.1. Hospitalist aware and stated no new orders if pt is not actively bleeding. Pt stated she is not actively bleeding anywhere.

## 2024-01-19 NOTE — PROGRESS NOTES
Progress Note - Dr. Potter - Internal Medicine  PCP: Dee Dee Pinon [id number] [facility] [address] [phone]    Hospital Day: 2  Code Status: Full Code  Current Diet: ADULT DIET; Regular; Low Sodium (2 gm); 1500 ml  ADULT ORAL NUTRITION SUPPLEMENT; Breakfast, Lunch, Dinner; Standard 4 oz Oral Supplement        CC: follow up on medical issues    Subjective:   Angélica Gonzalez is a 38 y.o. female.    Pt seen and examined  Chart reviewed since last visit, labs and imaging below    Anemic, hgb 6.3 - prbc ordered  Na 124  Bili better    Scheduled for paracentesis today    Review of Systems: (1 system for EPF, 2-9 for detailed, 10+ for comprehensive)  Constitutional: Negative for chills and fever.     HENT: Negative for dental problem, and rhinorrhea.    Positive for epistaxis  Eyes: Negative for photophobia and visual disturbance.     Respiratory: Negative for cough, chest tightness and shortness of breath.      Cardiovascular: Negative for chest pain and leg swelling.     Gastrointestinal: Negative for diarrhea, positive for nausea and vomiting.     Endocrine: Negative for polydipsia and polyphagia.     Genitourinary: Negative for frequency, hematuria and urgency.     Musculoskeletal: Negative for back pain and myalgias.     Skin: Negative for rash.   Positive for jaundice  Allergic/Immunologic: Negative for food allergies.     Neurological: Negative for dizziness, seizures, syncope and facial asymmetry.     Hematological: Negative for adenopathy.     Psychiatric/Behavioral: Negative for dysphoric mood. The patient is not nervous/anxious.        I have reviewed the patient's medical and social history in detail and updated the computerized patient record.  To recap: She  has a past medical history of ADHD, Cirrhosis (HCC), Dyslexia, ETOH abuse, Hepatic cirrhosis (HCC), and History of blood transfusion.. She  has a past surgical history that includes Paracentesis (10/14/2023) and Upper  need testing?  If yes, please place LAB Orders.->Yes Is the procedure for Diagnostic or Therapeutic reasons?->Therapeutic Is the procedure for Diagnostic or Therapeutic reasons?->Diagnostic TECHNIQUE: Informed consent was obtained after a detailed explanation of the procedure including risks, benefits, and alternatives.  Universal protocol was followed.  A limited ultrasound of the abdomen was performed. The right abdomen was prepped and draped in sterile fashion and local anesthesia was achieved with lidocaine.  A 5 Syrian needle sheath was advanced into ascites and paracentesis was performed.  The patient tolerated the procedure well. Estimated blood loss: Less than 5 cc FINDINGS: Limited ultrasound of the abdomen demonstrates ascites. A total of 7.5 L was removed.     Successful paracentesis.     IR US GUIDED PARACENTESIS    Result Date: 12/20/2023  PROCEDURE: PARACENTESIS WITHOUT IMAGE GUIDANCE US ABDOMEN LIMITED 12/20/2023 HISTORY: ORDERING SYSTEM PROVIDED HISTORY: Other ascites TECHNOLOGIST PROVIDED HISTORY: Does fluid need testing?  If yes, please place LAB Orders.->Yes Is the procedure for Diagnostic or Therapeutic reasons?->Therapeutic Is the procedure for Diagnostic or Therapeutic reasons?->Diagnostic TECHNIQUE: Informed consent was obtained after a detailed explanation of the procedure including risks, benefits, and alternatives.  Universal protocol was followed.  A limited ultrasound of the abdomen was performed. The right abdomen was prepped and draped in sterile fashion and local anesthesia was achieved with lidocaine.  A 5 Syrian needle sheath was advanced into ascites and paracentesis was performed.  The patient tolerated the procedure well. Estimated blood loss: Less than 5 cc FINDINGS: Limited ultrasound of the abdomen demonstrates ascites. A total of 5.1 L was removed.     Successful paracentesis.       Lab Results   Component Value Date/Time    GLUCOSE 103 01/19/2024 05:09 AM     No results found

## 2024-01-19 NOTE — PLAN OF CARE
Problem: Discharge Planning  Goal: Discharge to home or other facility with appropriate resources  Outcome: Progressing     Problem: Gastrointestinal - Adult  Goal: Minimal or absence of nausea and vomiting  Outcome: Progressing  Goal: Maintains adequate nutritional intake  Outcome: Progressing     Problem: Metabolic/Fluid and Electrolytes - Adult  Goal: Electrolytes maintained within normal limits  Outcome: Progressing  Goal: Hemodynamic stability and optimal renal function maintained  Outcome: Progressing     Problem: Nutrition Deficit:  Goal: Optimize nutritional status  Outcome: Progressing

## 2024-01-19 NOTE — PLAN OF CARE
Problem: Discharge Planning  Goal: Discharge to home or other facility with appropriate resources  Outcome: Progressing     Problem: Gastrointestinal - Adult  Goal: Minimal or absence of nausea and vomiting  Outcome: Progressing  Goal: Maintains adequate nutritional intake  Outcome: Progressing     Problem: Metabolic/Fluid and Electrolytes - Adult  Goal: Electrolytes maintained within normal limits  Outcome: Progressing  Goal: Hemodynamic stability and optimal renal function maintained  Outcome: Progressing     Problem: Skin/Tissue Integrity - Adult  Goal: Skin integrity remains intact  Outcome: Progressing     Problem: Musculoskeletal - Adult  Goal: Return mobility to safest level of function  Outcome: Progressing     Problem: Hematologic - Adult  Goal: Maintains hematologic stability  Outcome: Progressing     Problem: Nutrition Deficit:  Goal: Optimize nutritional status  Outcome: Progressing     Problem: Safety - Adult  Goal: Free from fall injury  Outcome: Progressing

## 2024-01-19 NOTE — PROGRESS NOTES
Gastroenterology Progress Note            Angélica Gonzalez is a 38 y.o. female patient.  1. Hyponatremia    2. Ascites due to alcoholic cirrhosis (HCC)    3. Hyperkalemia    4. Jaundice, recurrent        SUBJECTIVE:  Looks well.  Denies abd pain post paracentesis.  No melena nor bleeding.    Physical    VITALS:  BP (!) 101/52   Pulse 84   Temp 98.4 °F (36.9 °C) (Oral)   Resp 18   Ht 1.651 m (5' 5\")   Wt 82.8 kg (182 lb 8 oz)   SpO2 100%   BMI 30.37 kg/m²   TEMPERATURE:  Current - Temp: 98.4 °F (36.9 °C); Max - Temp  Av.1 °F (36.7 °C)  Min: 97.7 °F (36.5 °C)  Max: 98.6 °F (37 °C)    Alert, less jaundice  Abdomen soft, minimal distention, NT, no HSM, Bowel sounds normal     Data      Recent Labs     24  1432 24  0517 24  0509   WBC 14.7* 11.1* 8.1   HGB 8.1* 7.3* 6.3*   HCT 22.7* 20.9* 17.1*   .4* 109.7* 108.8*   PLT 90* 80* 63*     Recent Labs     24  1013 24  1432 24  0517 24  1332 24  0509   * 123* 121* 122* 124*   K 4.9 5.3* 4.9  --  4.7   CL 86* 90* 90*  --  92*   CO2 22 22 21  --  23   PHOS 4.2  --   --   --   --    BUN 35* 32* 20  --  27*   CREATININE <0.5* <0.5* <0.5*  --  <0.5*     Recent Labs     24  1432 24  0517 24  0509   AST 67* 61* 47*   ALT 28 27 21   BILIDIR 9.6* 9.1* 6.9*   BILITOT 22.8* 20.3* 15.5*   ALKPHOS 153* 140* 134*     No results for input(s): \"LIPASE\", \"AMYLASE\" in the last 72 hours.      ASSESSMENT :     Cirrhosis due to alcohol -complicated mainly by Ascites that has not responded to diuretics thus far and now complicated by recurrent significant Hyponatremia.  The additional hyperkalemia may indicate spironolactone as a potential contributing factor especially since the dose was increased recently.  Nephrology service is following and they agree with holding spironolactone for now and continuing furosemide.  They will consider adding urea and/or gentle normal saline hydration.

## 2024-01-19 NOTE — CONSULTS
[facility] ENT       Otolaryngology Consultation      Requesting Physician / Provider:  Mikael Ratliff MD        Date of Consultation:  01/19/2024        Date of Admission:   01/17/2024      Admission diagnosis:  Hyperkalemia [E87.5]  Hyponatremia [E87.1]  Ascites due to alcoholic cirrhosis (HCC) [K70.31]      History Obtained From:  patient, electronic medical record        IMPRESSION:   Recurrent minor epistaxis, most likely related to dryness of the nasal mucosa.  Possible TMJ syndrome.  No evidence of excessive cerumen.      RECOMMENDATIONS:   Nasal mucosal humidification measures.  No indication for ENT internention at this time.  Outpatient follow up two weeks after discharge.            CHIEF COMPLAINT / Reason for Consult:  \"frequent nose bleeds, ears feel like too much wax \"      HISTORY OF PRESENT ILLNESS:    Angélica Gonzalez is a 38 y.o. female is a new patient to me.  An otolaryngologic consultation was requested for evaluation of the above complaints.  Patient reported that she frequently has \"dried up darker blood\" in the mucus when she blows her nose, mainly on the right side and usually at night, off and on, for at least two years. It will bleed for about 1-2 minutes.  It is just blood in the mucus when she blows her nose.  It is not birght red blood and is not dripping out.. Yesterday morning had first episode of bright red blood from the right nostril.  It \"bled for about 5 minutes.  But no bleeding last night.\"  She is not on anticoagulant medication.  No nosebleeds prior to two years ago.      She also reported that she feels like she has too much wax in her ears.  The ear wax seems darker than usual.        REVIEW OF SYSTEMS:    I reviewed the ROS in the admission history and physical.  No N/V  Denied ENT pain  Denied drainage for the ears or nose.        Past Medical History:       Diagnosis Date    ADHD     Cirrhosis (HCC)     Dyslexia     ETOH abuse

## 2024-01-20 ENCOUNTER — APPOINTMENT (OUTPATIENT)
Dept: GENERAL RADIOLOGY | Age: 39
DRG: 641 | End: 2024-01-20
Payer: COMMERCIAL

## 2024-01-20 LAB
SODIUM SERPL-SCNC: 122 MMOL/L (ref 136–145)
SODIUM SERPL-SCNC: 122 MMOL/L (ref 136–145)
SODIUM SERPL-SCNC: 123 MMOL/L (ref 136–145)
SODIUM SERPL-SCNC: 124 MMOL/L (ref 136–145)

## 2024-01-20 PROCEDURE — 36415 COLL VENOUS BLD VENIPUNCTURE: CPT

## 2024-01-20 PROCEDURE — 6370000000 HC RX 637 (ALT 250 FOR IP): Performed by: INTERNAL MEDICINE

## 2024-01-20 PROCEDURE — 84295 ASSAY OF SERUM SODIUM: CPT

## 2024-01-20 PROCEDURE — 6360000002 HC RX W HCPCS: Performed by: INTERNAL MEDICINE

## 2024-01-20 PROCEDURE — 6370000000 HC RX 637 (ALT 250 FOR IP): Performed by: PHYSICIAN ASSISTANT

## 2024-01-20 PROCEDURE — 71045 X-RAY EXAM CHEST 1 VIEW: CPT

## 2024-01-20 PROCEDURE — P9047 ALBUMIN (HUMAN), 25%, 50ML: HCPCS | Performed by: INTERNAL MEDICINE

## 2024-01-20 PROCEDURE — 2060000000 HC ICU INTERMEDIATE R&B

## 2024-01-20 PROCEDURE — 2580000003 HC RX 258: Performed by: INTERNAL MEDICINE

## 2024-01-20 RX ORDER — FUROSEMIDE 40 MG/1
40 TABLET ORAL DAILY
Status: DISCONTINUED | OUTPATIENT
Start: 2024-01-21 | End: 2024-01-22

## 2024-01-20 RX ADMIN — Medication 2 SPRAY: at 04:58

## 2024-01-20 RX ADMIN — PANTOPRAZOLE SODIUM 40 MG: 40 TABLET, DELAYED RELEASE ORAL at 05:00

## 2024-01-20 RX ADMIN — FUROSEMIDE 40 MG: 40 TABLET ORAL at 08:49

## 2024-01-20 RX ADMIN — Medication 30 G: at 21:21

## 2024-01-20 RX ADMIN — CEFTRIAXONE SODIUM 1000 MG: 1 INJECTION, POWDER, FOR SOLUTION INTRAMUSCULAR; INTRAVENOUS at 17:14

## 2024-01-20 RX ADMIN — RIFAXIMIN 550 MG: 550 TABLET ORAL at 08:49

## 2024-01-20 RX ADMIN — Medication 2 SPRAY: at 07:59

## 2024-01-20 RX ADMIN — Medication 2 SPRAY: at 21:17

## 2024-01-20 RX ADMIN — CARVEDILOL 3.12 MG: 3.12 TABLET, FILM COATED ORAL at 17:13

## 2024-01-20 RX ADMIN — TRAMADOL HYDROCHLORIDE 50 MG: 50 TABLET ORAL at 18:43

## 2024-01-20 RX ADMIN — SODIUM CHLORIDE, PRESERVATIVE FREE 10 ML: 5 INJECTION INTRAVENOUS at 08:50

## 2024-01-20 RX ADMIN — SODIUM CHLORIDE: 9 INJECTION, SOLUTION INTRAVENOUS at 17:13

## 2024-01-20 RX ADMIN — MIDODRINE HYDROCHLORIDE 5 MG: 5 TABLET ORAL at 08:49

## 2024-01-20 RX ADMIN — Medication 2 SPRAY: at 15:31

## 2024-01-20 RX ADMIN — Medication 30 G: at 11:00

## 2024-01-20 RX ADMIN — Medication 2 SPRAY: at 23:50

## 2024-01-20 RX ADMIN — Medication 2 SPRAY: at 11:59

## 2024-01-20 RX ADMIN — BACITRACIN ZINC AND POLYMYXIN B SULFATE: at 22:32

## 2024-01-20 RX ADMIN — DEXTROAMPHETAMINE SACCHARATE, AMPHETAMINE ASPARTATE MONOHYDRATE, DEXTROAMPHETAMINE SULFATE AND AMPHETAMINE SULFATE 20 MG: 2.5; 2.5; 2.5; 2.5 CAPSULE, EXTENDED RELEASE ORAL at 08:49

## 2024-01-20 RX ADMIN — CARVEDILOL 3.12 MG: 3.12 TABLET, FILM COATED ORAL at 08:49

## 2024-01-20 RX ADMIN — RIFAXIMIN 550 MG: 550 TABLET ORAL at 22:33

## 2024-01-20 RX ADMIN — Medication 2 SPRAY: at 17:15

## 2024-01-20 RX ADMIN — Medication 2 SPRAY: at 23:00

## 2024-01-20 RX ADMIN — MIDODRINE HYDROCHLORIDE 5 MG: 5 TABLET ORAL at 11:46

## 2024-01-20 RX ADMIN — ALBUMIN (HUMAN) 25 G: 0.25 INJECTION, SOLUTION INTRAVENOUS at 04:54

## 2024-01-20 RX ADMIN — MIDODRINE HYDROCHLORIDE 5 MG: 5 TABLET ORAL at 17:13

## 2024-01-20 RX ADMIN — FERROUS SULFATE TAB 325 MG (65 MG ELEMENTAL FE) 325 MG: 325 (65 FE) TAB at 08:49

## 2024-01-20 RX ADMIN — FOLIC ACID 1 MG: 1 TABLET ORAL at 08:49

## 2024-01-20 ASSESSMENT — PAIN DESCRIPTION - DESCRIPTORS
DESCRIPTORS: DISCOMFORT

## 2024-01-20 ASSESSMENT — PAIN DESCRIPTION - ORIENTATION
ORIENTATION: RIGHT

## 2024-01-20 ASSESSMENT — PAIN SCALES - GENERAL
PAINLEVEL_OUTOF10: 7
PAINLEVEL_OUTOF10: 3
PAINLEVEL_OUTOF10: 6
PAINLEVEL_OUTOF10: 0
PAINLEVEL_OUTOF10: 0

## 2024-01-20 ASSESSMENT — PAIN DESCRIPTION - LOCATION
LOCATION: RIB CAGE

## 2024-01-20 NOTE — PROGRESS NOTES
Progress Note - Dr. Potter - Internal Medicine  PCP: Dee Dee Pinon [address] [facility] [address] [phone]    Hospital Day: 3  Code Status: Full Code  Current Diet: ADULT DIET; Regular; Low Sodium (2 gm); 1500 ml  ADULT ORAL NUTRITION SUPPLEMENT; Breakfast, Lunch, Dinner; Standard 4 oz Oral Supplement        CC: follow up on medical issues    Subjective:   Angélica Gonzalez is a 38 y.o. female.    Pt seen and examined  Chart reviewed since last visit, labs and imaging below    Anemia better after transfusion, hgb 7.3   Na still quite low, 124    2.1L out in paracentesis 1/19    Review of Systems: (1 system for EPF, 2-9 for detailed, 10+ for comprehensive)  Constitutional: Negative for chills and fever.     HENT: Negative for dental problem, and rhinorrhea.    Positive for epistaxis  Eyes: Negative for photophobia and visual disturbance.     Respiratory: Negative for cough, chest tightness and shortness of breath.      Cardiovascular: Negative for chest pain and leg swelling.     Gastrointestinal: Negative for diarrhea, positive for nausea and vomiting.     Endocrine: Negative for polydipsia and polyphagia.     Genitourinary: Negative for frequency, hematuria and urgency.     Musculoskeletal: Negative for back pain and myalgias.     Skin: Negative for rash.   Positive for jaundice  Allergic/Immunologic: Negative for food allergies.     Neurological: Negative for dizziness, seizures, syncope and facial asymmetry.     Hematological: Negative for adenopathy.     Psychiatric/Behavioral: Negative for dysphoric mood. The patient is not nervous/anxious.        I have reviewed the patient's medical and social history in detail and updated the computerized patient record.  To recap: She  has a past medical history of ADHD, Cirrhosis (HCC), Dyslexia, ETOH abuse, Hepatic cirrhosis (HCC), and History of blood transfusion.. She  has a past surgical history that includes Paracentesis (10/14/2023) and Upper  40 mEq, 40 mEq, Oral, PRN **OR** potassium chloride 10 mEq/100 mL IVPB (Peripheral Line), 10 mEq, IntraVENous, PRN  [Held by provider] enoxaparin (LOVENOX) injection 40 mg, 40 mg, SubCUTAneous, Daily  ondansetron (ZOFRAN-ODT) disintegrating tablet 4 mg, 4 mg, Oral, Q8H PRN **OR** ondansetron (ZOFRAN) injection 4 mg, 4 mg, IntraVENous, Q6H PRN  magnesium hydroxide (MILK OF MAGNESIA) 400 MG/5ML suspension 30 mL, 30 mL, Oral, Daily PRN  acetaminophen (TYLENOL) tablet 650 mg, 650 mg, Oral, Q6H PRN **OR** acetaminophen (TYLENOL) suppository 650 mg, 650 mg, Rectal, Q6H PRN  hydrALAZINE (APRESOLINE) injection 10 mg, 10 mg, IntraVENous, Q6H PRN  sodium chloride 0.9 % bolus 500 mL, 500 mL, IntraVENous, PRN  cefTRIAXone (ROCEPHIN) 1,000 mg in sodium chloride 0.9 % 50 mL IVPB (mini-bag), 1,000 mg, IntraVENous, Q24H  furosemide (LASIX) tablet 40 mg, 40 mg, Oral, BID         Objective:  BP (!) 98/56   Pulse 82   Temp 97.9 °F (36.6 °C) (Oral)   Resp 18   Ht 1.651 m (5' 5\")   Wt 81.6 kg (179 lb 12.8 oz)   SpO2 99%   BMI 29.92 kg/m²      Patient Vitals for the past 24 hrs:   BP Temp Temp src Pulse Resp SpO2 Weight   01/20/24 0757 (!) 98/56 97.9 °F (36.6 °C) Oral 82 18 99 % --   01/20/24 0656 (!) 96/42 -- Oral 80 18 100 % --   01/20/24 0646 -- -- -- -- -- -- 81.6 kg (179 lb 12.8 oz)   01/20/24 0456 (!) 93/52 98 °F (36.7 °C) Oral 84 16 98 % --   01/19/24 2331 (!) 92/53 98.1 °F (36.7 °C) Oral 81 18 100 % --   01/19/24 2133 (!) 93/49 -- -- 81 -- 99 % --   01/19/24 1910 (!) 89/47 98.1 °F (36.7 °C) Oral 92 16 99 % --   01/19/24 1700 (!) 91/55 -- -- -- -- -- --   01/19/24 1603 -- -- -- -- -- -- 81.6 kg (180 lb)   01/19/24 1515 (!) 90/47 98.3 °F (36.8 °C) Oral 95 18 100 % --   01/19/24 1222 (!) 101/52 98.4 °F (36.9 °C) Oral 84 18 100 % --   01/19/24 1207 (!) 75/51 98.6 °F (37 °C) Oral 89 18 100 % --   01/19/24 1145 (!) 97/51 98.4 °F (36.9 °C) Oral 87 18 100 % --   01/19/24 1030 (!) 94/53 -- -- -- -- -- --   01/19/24 0930 (!) 90/51

## 2024-01-20 NOTE — PROGRESS NOTES
[facility] Note    Patient Active Problem List   Diagnosis    Dizziness    Hyponatremia    Decompensated hepatic cirrhosis (HCC)    Ascites due to alcoholic cirrhosis (HCC)    Anemia    S/P abdominal paracentesis    Esophageal varices (HCC)    GERD (gastroesophageal reflux disease)           Stable hypotension.  Improved urine output.  Had abdominal paracentesis.    On midodrine.  IV albumin.  Lasix 40 mg twice daily.      Hyponatremia-moderate to severe-worsening.  - Repeat sodium is 122.  Creatinine 0.5.  BUN 27.  Total protein is 5.2.    BUN 27.    Reduce Lasix to 40 mg daily-may have hyponatremia.  - Begin ure Na  30 g twice daily..  - May require salt tablet but will avoid today due to fluid overload.    - Hemoglobin 6.3.  - May benefit from blood transfusion.    Patient is critically ill.    Doubtful for discharge.                              Past Medical History:   has a past medical history of ADHD, Cirrhosis (HCC), Dyslexia, ETOH abuse, Hepatic cirrhosis (HCC), and History of blood transfusion.    Past Social History:   reports that she has never smoked. She has never used smokeless tobacco. She reports that she does not currently use alcohol. She reports that she does not use drugs.    Subjective:    No neurologic symptoms  Getting prbc transfusion  Feels tired    Review of Systems   Constitutional:  Positive for fatigue. Negative for activity change, appetite change, chills, fever and unexpected weight change.   HENT:  Negative for congestion and facial swelling.    Eyes:  Negative for photophobia, discharge and redness.   Respiratory:  Negative for cough, chest tightness and shortness of breath.    Cardiovascular:  Positive for leg swelling. Negative for chest pain and palpitations.   Gastrointestinal:  Positive for abdominal distention. Negative for abdominal pain, blood in stool, constipation, diarrhea, nausea and vomiting.   Endocrine: Negative for cold intolerance,

## 2024-01-20 NOTE — PROGRESS NOTES
Pt educated on high fall risk precautions and indication for bed alarm per PA Fall risk score. Pt signed refusal of treatment for for high fall precautions and bed alarms. Document placed in paper chart at unit desk. Bed alarms to remain off at this time. Pt encouraged to call out for assistance if needed.

## 2024-01-20 NOTE — PLAN OF CARE
Problem: Discharge Planning  Goal: Discharge to home or other facility with appropriate resources  Outcome: Progressing  Flowsheets (Taken 1/20/2024 0850)  Discharge to home or other facility with appropriate resources: Identify barriers to discharge with patient and caregiver     Problem: Gastrointestinal - Adult  Goal: Minimal or absence of nausea and vomiting  Outcome: Progressing  Flowsheets (Taken 1/20/2024 0850)  Minimal or absence of nausea and vomiting: Administer IV fluids as ordered to ensure adequate hydration  Goal: Maintains adequate nutritional intake  Outcome: Progressing  Flowsheets (Taken 1/20/2024 0850)  Maintains adequate nutritional intake: Monitor percentage of each meal consumed     Problem: Metabolic/Fluid and Electrolytes - Adult  Goal: Electrolytes maintained within normal limits  Outcome: Progressing  Flowsheets (Taken 1/20/2024 0850)  Electrolytes maintained within normal limits: Monitor labs and assess patient for signs and symptoms of electrolyte imbalances  Goal: Hemodynamic stability and optimal renal function maintained  Outcome: Progressing  Flowsheets (Taken 1/20/2024 0850)  Hemodynamic stability and optimal renal function maintained: Monitor labs and assess for signs and symptoms of volume excess or deficit     Problem: Skin/Tissue Integrity - Adult  Goal: Skin integrity remains intact  Outcome: Progressing  Flowsheets (Taken 1/20/2024 0850)  Skin Integrity Remains Intact: Monitor for areas of redness and/or skin breakdown     Problem: Musculoskeletal - Adult  Goal: Return mobility to safest level of function  Outcome: Progressing  Flowsheets (Taken 1/20/2024 0850)  Return Mobility to Safest Level of Function: Assess patient stability and activity tolerance for standing, transferring and ambulating with or without assistive devices     Problem: Hematologic - Adult  Goal: Maintains hematologic stability  Outcome: Progressing  Flowsheets (Taken 1/20/2024 0850)  Maintains  hematologic stability: Assess for signs and symptoms of bleeding or hemorrhage     Problem: Nutrition Deficit:  Goal: Optimize nutritional status  Outcome: Progressing     Problem: Safety - Adult  Goal: Free from fall injury  Outcome: Progressing

## 2024-01-20 NOTE — PROGRESS NOTES
Pt had unwitnessed fall while pt parents were in room. Pt tripped over a sheet. Pt denies hitting head, but stated where the tele box was on her right side was sore. No change in exam from this AM. MD notified and x-ray ordered.

## 2024-01-20 NOTE — PROGRESS NOTES
Gastroenterology Progress Note            Angélica Gonzalez is a 38 y.o. female patient.  Principal Problem:    Hyponatremia  Active Problems:    Decompensated hepatic cirrhosis (HCC)    Ascites due to alcoholic cirrhosis (HCC)    Anemia    GERD (gastroesophageal reflux disease)  Resolved Problems:    * No resolved hospital problems. *      SUBJECTIVE:  Feels ok. No c/o.  Wants to go home.    ROS:    Gastrointestinal ROS: positive for - gas/bloating.  Cardiovascular ROS: negative  Respiratory ROS: negative    Physical    VITALS:  BP (!) 98/56   Pulse 82   Temp 97.9 °F (36.6 °C) (Oral)   Resp 18   Ht 1.651 m (5' 5\")   Wt 81.6 kg (179 lb 12.8 oz)   SpO2 99%   BMI 29.92 kg/m²   TEMPERATURE:  Current - Temp: 97.9 °F (36.6 °C); Max - Temp  Av.2 °F (36.8 °C)  Min: 97.9 °F (36.6 °C)  Max: 98.6 °F (37 °C)    NAD  RRR, Nl s1s2  Lungs CTA Bilaterally, normal effort  Abdomen soft, distended with ascites, NT, no HSM, Bowel sounds normal.  Neuro: A&Ox4, no asterixis.    Data    Data Review:    Recent Labs     24  1432 24  0517 24  0509 24  1921   WBC 14.7* 11.1* 8.1  --    HGB 8.1* 7.3* 6.3* 7.3*   HCT 22.7* 20.9* 17.1* 20.3*   .4* 109.7* 108.8*  --    PLT 90* 80* 63*  --      Recent Labs     24  1432 24  0517 24  1332 24  0509 24  2157 24  0443 24  1003   * 121*   < > 124* 121* 124* 122*   K 5.3* 4.9  --  4.7  --   --   --    CL 90* 90*  --  92*  --   --   --    CO2 22 21  --  23  --   --   --    BUN 32* 20  --  27*  --   --   --    CREATININE <0.5* <0.5*  --  <0.5*  --   --   --     < > = values in this interval not displayed.     Recent Labs     24  1432 24  0517 24  0509   AST 67* 61* 47*   ALT 28 27 21   BILIDIR 9.6* 9.1* 6.9*   BILITOT 22.8* 20.3* 15.5*   ALKPHOS 153* 140* 134*     No results for input(s): \"LIPASE\", \"AMYLASE\" in the last 72 hours.  Recent Labs     24  1526   PROTIME 29.0*    INR 2.76*     No results for input(s): \"PTT\" in the last 72 hours.      ASSESSMENT :     Cirrhosis due to alcohol -complicated mainly by Ascites that has not responded to diuretics thus far and now complicated by recurrent significant Hyponatremia.  The additional hyperkalemia may indicate spironolactone as a potential contributing factor especially since the dose was increased recently.  Nephrology service is following and they agree with holding spironolactone for now and continuing furosemide.  They are treating with urea. Her bilirubin level is high but is declining to lower than last week.  Her serum ammonia level was elevated however she has no clinical signs of encephalopathy.     Intractable ascites -continue furosemide as discussed above.  Now s/p large-volume paracentesis with non neutrocytic ascites per cell ct.     Hyperammonemia -may be due to urea if treatment.  She has had some mild changes prior to admission (drowsiness, difficulty concentrating).  Last ammonia was 68. No asterixis.      Leukocytosis -unclear source.  Blood cultures, urinalysis, urine culture, and chest x-ray have been ordered.  She will receive empiric antibiotics as per current guidelines for hospitalized patients with cirrhosis and ascites.        PLAN  :  Empiric antibiotic prophylaxis with Rocephin 1 g IV every 24 hours per current guidelines.  Continue Xifaxan 550 mg PO BID  Diuretics -> furosemide decreased to daily dosing, d/c spironolactone) per Nephrology.  She has appointment to follow-up with  liver transplant service as an outpatient later this month.  PRBC transfusion prn for chronic anemia if Hb<7.0  OK to discharge from GI standpoint   Can repeat paracentesis Monday if still inpatient.    Kasi Drew MD  [facility]  1/20/2024

## 2024-01-21 LAB
ACANTHOCYTES BLD QL SMEAR: ABNORMAL
ALBUMIN SERPL-MCNC: 3.9 G/DL (ref 3.4–5)
ALP SERPL-CCNC: 169 U/L (ref 40–129)
ALT SERPL-CCNC: 20 U/L (ref 10–40)
ANION GAP SERPL CALCULATED.3IONS-SCNC: 11 MMOL/L (ref 3–16)
ANISOCYTOSIS BLD QL SMEAR: ABNORMAL
AST SERPL-CCNC: 51 U/L (ref 15–37)
BACTERIA BLD CULT ORG #2: NORMAL
BACTERIA BLD CULT: NORMAL
BASOPHILS # BLD: 0 K/UL (ref 0–0.2)
BASOPHILS NFR BLD: 0 %
BILIRUB DIRECT SERPL-MCNC: 5.3 MG/DL (ref 0–0.3)
BILIRUB INDIRECT SERPL-MCNC: 8.6 MG/DL (ref 0–1)
BILIRUB SERPL-MCNC: 13.9 MG/DL (ref 0–1)
BUN SERPL-MCNC: 42 MG/DL (ref 7–20)
BURR CELLS BLD QL SMEAR: ABNORMAL
CALCIUM SERPL-MCNC: 8.9 MG/DL (ref 8.3–10.6)
CHLORIDE SERPL-SCNC: 92 MMOL/L (ref 99–110)
CO2 SERPL-SCNC: 21 MMOL/L (ref 21–32)
CREAT SERPL-MCNC: <0.5 MG/DL (ref 0.6–1.1)
DEPRECATED RDW RBC AUTO: 21 % (ref 12.4–15.4)
EOSINOPHIL # BLD: 0.3 K/UL (ref 0–0.6)
EOSINOPHIL NFR BLD: 3 %
GFR SERPLBLD CREATININE-BSD FMLA CKD-EPI: >60 ML/MIN/{1.73_M2}
GLUCOSE SERPL-MCNC: 153 MG/DL (ref 70–99)
HCT VFR BLD AUTO: 21.2 % (ref 36–48)
HGB BLD-MCNC: 7.7 G/DL (ref 12–16)
LYMPHOCYTES # BLD: 1.3 K/UL (ref 1–5.1)
LYMPHOCYTES NFR BLD: 15 %
MACROCYTES BLD QL SMEAR: ABNORMAL
MCH RBC QN AUTO: 38.4 PG (ref 26–34)
MCHC RBC AUTO-ENTMCNC: 36.1 G/DL (ref 31–36)
MCV RBC AUTO: 106.6 FL (ref 80–100)
MONOCYTES # BLD: 1 K/UL (ref 0–1.3)
MONOCYTES NFR BLD: 12 %
NEUTROPHILS # BLD: 6 K/UL (ref 1.7–7.7)
NEUTROPHILS NFR BLD: 69 %
NEUTS BAND NFR BLD MANUAL: 1 % (ref 0–7)
OVALOCYTES BLD QL SMEAR: ABNORMAL
PLATELET # BLD AUTO: 72 K/UL (ref 135–450)
PLATELET BLD QL SMEAR: ABNORMAL
PMV BLD AUTO: 7.2 FL (ref 5–10.5)
POIKILOCYTOSIS BLD QL SMEAR: ABNORMAL
POLYCHROMASIA BLD QL SMEAR: ABNORMAL
POTASSIUM SERPL-SCNC: 4.3 MMOL/L (ref 3.5–5.1)
PROT SERPL-MCNC: 5.6 G/DL (ref 6.4–8.2)
RBC # BLD AUTO: 1.99 M/UL (ref 4–5.2)
SLIDE REVIEW: ABNORMAL
SODIUM SERPL-SCNC: 120 MMOL/L (ref 136–145)
SODIUM SERPL-SCNC: 121 MMOL/L (ref 136–145)
SODIUM SERPL-SCNC: 124 MMOL/L (ref 136–145)
SODIUM SERPL-SCNC: 124 MMOL/L (ref 136–145)
SODIUM SERPL-SCNC: 126 MMOL/L (ref 136–145)
WBC # BLD AUTO: 8.5 K/UL (ref 4–11)

## 2024-01-21 PROCEDURE — 85025 COMPLETE CBC W/AUTO DIFF WBC: CPT

## 2024-01-21 PROCEDURE — 2060000000 HC ICU INTERMEDIATE R&B

## 2024-01-21 PROCEDURE — 6370000000 HC RX 637 (ALT 250 FOR IP): Performed by: PHYSICIAN ASSISTANT

## 2024-01-21 PROCEDURE — 6370000000 HC RX 637 (ALT 250 FOR IP): Performed by: INTERNAL MEDICINE

## 2024-01-21 PROCEDURE — 36415 COLL VENOUS BLD VENIPUNCTURE: CPT

## 2024-01-21 PROCEDURE — 2580000003 HC RX 258: Performed by: INTERNAL MEDICINE

## 2024-01-21 PROCEDURE — 80076 HEPATIC FUNCTION PANEL: CPT

## 2024-01-21 PROCEDURE — 84295 ASSAY OF SERUM SODIUM: CPT

## 2024-01-21 PROCEDURE — 6360000002 HC RX W HCPCS: Performed by: INTERNAL MEDICINE

## 2024-01-21 PROCEDURE — 80048 BASIC METABOLIC PNL TOTAL CA: CPT

## 2024-01-21 RX ORDER — TOLVAPTAN 15 MG/1
15 TABLET ORAL DAILY
Status: COMPLETED | OUTPATIENT
Start: 2024-01-21 | End: 2024-01-21

## 2024-01-21 RX ADMIN — Medication 30 G: at 21:44

## 2024-01-21 RX ADMIN — Medication 2 SPRAY: at 07:08

## 2024-01-21 RX ADMIN — MIDODRINE HYDROCHLORIDE 5 MG: 5 TABLET ORAL at 11:51

## 2024-01-21 RX ADMIN — Medication 2 SPRAY: at 17:48

## 2024-01-21 RX ADMIN — Medication 30 G: at 08:16

## 2024-01-21 RX ADMIN — Medication 2 SPRAY: at 07:07

## 2024-01-21 RX ADMIN — MIDODRINE HYDROCHLORIDE 5 MG: 5 TABLET ORAL at 17:48

## 2024-01-21 RX ADMIN — CEFTRIAXONE SODIUM 1000 MG: 1 INJECTION, POWDER, FOR SOLUTION INTRAMUSCULAR; INTRAVENOUS at 18:05

## 2024-01-21 RX ADMIN — DEXTROAMPHETAMINE SACCHARATE, AMPHETAMINE ASPARTATE MONOHYDRATE, DEXTROAMPHETAMINE SULFATE AND AMPHETAMINE SULFATE 20 MG: 2.5; 2.5; 2.5; 2.5 CAPSULE, EXTENDED RELEASE ORAL at 08:43

## 2024-01-21 RX ADMIN — SODIUM CHLORIDE, PRESERVATIVE FREE 10 ML: 5 INJECTION INTRAVENOUS at 08:08

## 2024-01-21 RX ADMIN — Medication 2 SPRAY: at 21:44

## 2024-01-21 RX ADMIN — MIDODRINE HYDROCHLORIDE 5 MG: 5 TABLET ORAL at 08:06

## 2024-01-21 RX ADMIN — FOLIC ACID 1 MG: 1 TABLET ORAL at 08:07

## 2024-01-21 RX ADMIN — RIFAXIMIN 550 MG: 550 TABLET ORAL at 21:44

## 2024-01-21 RX ADMIN — Medication 2 SPRAY: at 23:45

## 2024-01-21 RX ADMIN — FUROSEMIDE 40 MG: 40 TABLET ORAL at 08:09

## 2024-01-21 RX ADMIN — SODIUM CHLORIDE, PRESERVATIVE FREE 10 ML: 5 INJECTION INTRAVENOUS at 21:51

## 2024-01-21 RX ADMIN — TRAMADOL HYDROCHLORIDE 50 MG: 50 TABLET ORAL at 08:16

## 2024-01-21 RX ADMIN — CARVEDILOL 3.12 MG: 3.12 TABLET, FILM COATED ORAL at 17:48

## 2024-01-21 RX ADMIN — RIFAXIMIN 550 MG: 550 TABLET ORAL at 08:16

## 2024-01-21 RX ADMIN — SODIUM CHLORIDE, PRESERVATIVE FREE 10 ML: 5 INJECTION INTRAVENOUS at 00:13

## 2024-01-21 RX ADMIN — Medication 2 SPRAY: at 18:06

## 2024-01-21 RX ADMIN — FERROUS SULFATE TAB 325 MG (65 MG ELEMENTAL FE) 325 MG: 325 (65 FE) TAB at 08:07

## 2024-01-21 RX ADMIN — Medication 2 SPRAY: at 11:51

## 2024-01-21 RX ADMIN — TOLVAPTAN 15 MG: 15 TABLET ORAL at 10:48

## 2024-01-21 RX ADMIN — Medication 2 SPRAY: at 14:53

## 2024-01-21 RX ADMIN — Medication 2 SPRAY: at 14:52

## 2024-01-21 RX ADMIN — CARVEDILOL 3.12 MG: 3.12 TABLET, FILM COATED ORAL at 08:07

## 2024-01-21 RX ADMIN — PANTOPRAZOLE SODIUM 40 MG: 40 TABLET, DELAYED RELEASE ORAL at 08:07

## 2024-01-21 ASSESSMENT — PAIN DESCRIPTION - ORIENTATION
ORIENTATION: RIGHT
ORIENTATION: RIGHT

## 2024-01-21 ASSESSMENT — PAIN SCALES - GENERAL
PAINLEVEL_OUTOF10: 0
PAINLEVEL_OUTOF10: 8
PAINLEVEL_OUTOF10: 0
PAINLEVEL_OUTOF10: 3
PAINLEVEL_OUTOF10: 3

## 2024-01-21 ASSESSMENT — PAIN DESCRIPTION - LOCATION
LOCATION: ABDOMEN
LOCATION: ABDOMEN

## 2024-01-21 NOTE — PROGRESS NOTES
Gastroenterology Progress Note            Angélica Gonzalez is a 38 y.o. female patient.  Principal Problem:    Hyponatremia  Active Problems:    Decompensated hepatic cirrhosis (HCC)    Ascites due to alcoholic cirrhosis (HCC)    Anemia    GERD (gastroesophageal reflux disease)  Resolved Problems:    * No resolved hospital problems. *      SUBJECTIVE:  Feels fine.  Tripped and fell last pm on sheet.  Denies pain this am.    ROS:    Gastrointestinal ROS: positive for - gas/bloating  negative for - abdominal pain, constipation, diarrhea, melena, or nausea/vomiting.  Cardiovascular ROS: negative  Respiratory ROS: negative    Physical    VITALS:  /64   Pulse 79   Temp 97.7 °F (36.5 °C) (Oral)   Resp 17   Ht 1.651 m (5' 5\")   Wt 81.6 kg (179 lb 12.8 oz)   SpO2 97%   BMI 29.92 kg/m²   TEMPERATURE:  Current - Temp: 97.7 °F (36.5 °C); Max - Temp  Av.7 °F (36.5 °C)  Min: 97.5 °F (36.4 °C)  Max: 98 °F (36.7 °C)    NAD  RRR, Nl s1s2  Lungs CTA Bilaterally, normal effort  Abdomen soft, but distended (unchanged), NT, no HSM, Bowel sounds normal.  Neuro: A&Ox4, no asterixis.    Data    Data Review:    Recent Labs     24   WBC 8.1  --  8.5   HGB 6.3* 7.3* 7.7*   HCT 17.1* 20.3* 21.2*   .8*  --  106.6*   PLT 63*  --  72*     Recent Labs     24  05024  1623 247 24  0438   *   < > 122* 123* 124*  124*   K 4.7  --   --   --  4.3   CL 92*  --   --   --  92*   CO2 23  --   --   --  21   BUN 27*  --   --   --  42*   CREATININE <0.5*  --   --   --  <0.5*    < > = values in this interval not displayed.     Recent Labs     24  05024  0438   AST 47* 51*   ALT 21 20   BILIDIR 6.9* 5.3*   BILITOT 15.5* 13.9*   ALKPHOS 134* 169*     No results for input(s): \"LIPASE\", \"AMYLASE\" in the last 72 hours.  No results for input(s): \"PROTIME\", \"INR\" in the last 72 hours.  No results for input(s): \"PTT\"

## 2024-01-21 NOTE — PROGRESS NOTES
Progress Note - Dr. Potter - Internal Medicine  PCP: Dee Dee Pinon [address] [facility] [address] [phone]    Hospital Day: 4  Code Status: Full Code  Current Diet: ADULT DIET; Regular; Low Sodium (2 gm); 1500 ml  ADULT ORAL NUTRITION SUPPLEMENT; Breakfast, Lunch, Dinner; Standard 4 oz Oral Supplement        CC: follow up on medical issues    Subjective:   Angélica Gonzalez is a 38 y.o. female.    Pt seen and examined  Chart reviewed since last visit, labs and imaging below    Anemia stable after transfusion, hgb 7.37  Na still quite low, 120    2.1L out in paracentesis 1/19  May require repeat paracentesis 1/22    Review of Systems: (1 system for EPF, 2-9 for detailed, 10+ for comprehensive)  Constitutional: Negative for chills and fever.     HENT: Negative for dental problem, and rhinorrhea.    Positive for epistaxis  Eyes: Negative for photophobia and visual disturbance.     Respiratory: Negative for cough, chest tightness and shortness of breath.      Cardiovascular: Negative for chest pain and leg swelling.     Gastrointestinal: Negative for diarrhea, positive for nausea and vomiting.     Endocrine: Negative for polydipsia and polyphagia.     Genitourinary: Negative for frequency, hematuria and urgency.     Musculoskeletal: Negative for back pain and myalgias.     Skin: Negative for rash.   Positive for jaundice  Allergic/Immunologic: Negative for food allergies.     Neurological: Negative for dizziness, seizures, syncope and facial asymmetry.     Hematological: Negative for adenopathy.     Psychiatric/Behavioral: Negative for dysphoric mood. The patient is not nervous/anxious.        I have reviewed the patient's medical and social history in detail and updated the computerized patient record.  To recap: She  has a past medical history of ADHD, Cirrhosis (HCC), Dyslexia, ETOH abuse, Hepatic cirrhosis (HCC), and History of blood transfusion.. She  has a past surgical history that includes

## 2024-01-21 NOTE — PROGRESS NOTES
pain and palpitations.   Gastrointestinal:  Positive for abdominal distention. Negative for abdominal pain, blood in stool, constipation, diarrhea, nausea and vomiting.   Endocrine: Negative for cold intolerance, heat intolerance and polyuria.   Genitourinary:  Negative for decreased urine volume, difficulty urinating, flank pain and hematuria.   Musculoskeletal:  Negative for joint swelling and neck pain.   Neurological:  Negative for dizziness, seizures, syncope, speech difficulty, light-headedness and headaches.   Hematological:  Does not bruise/bleed easily.   Psychiatric/Behavioral:  Negative for agitation, confusion and hallucinations.        Objective:      /64   Pulse 79   Temp 97.7 °F (36.5 °C) (Oral)   Resp 17   Ht 1.651 m (5' 5\")   Wt 81.6 kg (179 lb 12.8 oz)   SpO2 97%   BMI 29.92 kg/m²     Wt Readings from Last 3 Encounters:   01/20/24 81.6 kg (179 lb 12.8 oz)   01/13/24 76.9 kg (169 lb 9.6 oz)   11/15/23 83.9 kg (184 lb 14.4 oz)       BP Readings from Last 3 Encounters:   01/21/24 105/64   01/17/24 (!) 105/59   01/13/24 (!) 95/55     HEENT-icteric sclerae  Chest- clear  Heart-regular  Abd-soft  Ext- 1+ edema    Labs  Hemoglobin   Date Value Ref Range Status   01/21/2024 7.7 (L) 12.0 - 16.0 g/dL Final     Hematocrit   Date Value Ref Range Status   01/21/2024 21.2 (L) 36.0 - 48.0 % Final     WBC   Date Value Ref Range Status   01/21/2024 8.5 4.0 - 11.0 K/uL Final     Platelets   Date Value Ref Range Status   01/21/2024 72 (L) 135 - 450 K/uL Final     Lab Results   Component Value Date    CREATININE <0.5 (L) 01/21/2024    BUN 42 (H) 01/21/2024     (L) 01/21/2024    K 4.3 01/21/2024    CL 92 (L) 01/21/2024    CO2 21 01/21/2024     Alb 3.6  UA s.g. <1.005, blood and protein neg  UPCR 200mg  Uosm pending    Ammonia 79 from 68      Assessment/Plan:   Acute on Chronic Hyponatremia-goal Na in the next 24 hours around 127-129.  Euvolemic.  Check sodium every 6 hours.  Positive nausea.  Also on  SPLT.   Previous urine studies reflecting SIADH.   1.5L/24H fluid restriction.  Hold spironolactone for now.  Lasix 40 mg p.o. twice daily.  Stop urea with higher ammonia level.  Albumin 25g x 3 doses.  Prbc transfusion.  If persistent over the weekend, Tolvaptan daily x 2 doses.  Discussed with ngoc RICK from their perspective.    Relative Hypotension-albumin x 3 more doses.  Midodrine added.  RBC transfusion.    Anemia-follow Hgb closely.  Per Medicine   H/O Alcoholic Liver Cirrhosis-last alcohol use about 9/2023.   Hypoalbuminemia  H/O Ascites-follow for paracentesis need  Hyperkalemia-low potassium diet.  Lasix twice daily.  Better.   Okay to use Adderall     Yevgeniy Baldwin MD

## 2024-01-22 ENCOUNTER — APPOINTMENT (OUTPATIENT)
Dept: INTERVENTIONAL RADIOLOGY/VASCULAR | Age: 39
DRG: 641 | End: 2024-01-22
Payer: COMMERCIAL

## 2024-01-22 LAB
ALBUMIN SERPL-MCNC: 4 G/DL (ref 3.4–5)
ALP SERPL-CCNC: 152 U/L (ref 40–129)
ALT SERPL-CCNC: 21 U/L (ref 10–40)
AMMONIA PLAS-SCNC: 85 UMOL/L (ref 11–51)
ANION GAP SERPL CALCULATED.3IONS-SCNC: 9 MMOL/L (ref 3–16)
AST SERPL-CCNC: 52 U/L (ref 15–37)
BACTERIA FLD AEROBE CULT: NORMAL
BASOPHILS # BLD: 0 K/UL (ref 0–0.2)
BASOPHILS NFR BLD: 0.5 %
BILIRUB DIRECT SERPL-MCNC: 5.5 MG/DL (ref 0–0.3)
BILIRUB INDIRECT SERPL-MCNC: 9 MG/DL (ref 0–1)
BILIRUB SERPL-MCNC: 14.5 MG/DL (ref 0–1)
BUN SERPL-MCNC: 44 MG/DL (ref 7–20)
CALCIUM SERPL-MCNC: 9.2 MG/DL (ref 8.3–10.6)
CHLORIDE SERPL-SCNC: 93 MMOL/L (ref 99–110)
CO2 SERPL-SCNC: 24 MMOL/L (ref 21–32)
CREAT SERPL-MCNC: <0.5 MG/DL (ref 0.6–1.1)
DEPRECATED RDW RBC AUTO: 21.4 % (ref 12.4–15.4)
EOSINOPHIL # BLD: 0.3 K/UL (ref 0–0.6)
EOSINOPHIL NFR BLD: 3.4 %
GFR SERPLBLD CREATININE-BSD FMLA CKD-EPI: >60 ML/MIN/{1.73_M2}
GLUCOSE SERPL-MCNC: 152 MG/DL (ref 70–99)
GRAM STN SPEC: NORMAL
HCT VFR BLD AUTO: 21.7 % (ref 36–48)
HGB BLD-MCNC: 7.9 G/DL (ref 12–16)
LYMPHOCYTES # BLD: 1 K/UL (ref 1–5.1)
LYMPHOCYTES NFR BLD: 12.5 %
MCH RBC QN AUTO: 39.1 PG (ref 26–34)
MCHC RBC AUTO-ENTMCNC: 36.5 G/DL (ref 31–36)
MCV RBC AUTO: 107.2 FL (ref 80–100)
MONOCYTES # BLD: 0.9 K/UL (ref 0–1.3)
MONOCYTES NFR BLD: 11 %
NEUTROPHILS # BLD: 5.9 K/UL (ref 1.7–7.7)
NEUTROPHILS NFR BLD: 72.6 %
PATH CONSULT FLUID: NORMAL
PLATELET # BLD AUTO: 75 K/UL (ref 135–450)
PMV BLD AUTO: 6.8 FL (ref 5–10.5)
POTASSIUM SERPL-SCNC: 4.3 MMOL/L (ref 3.5–5.1)
PROT SERPL-MCNC: 5.7 G/DL (ref 6.4–8.2)
RBC # BLD AUTO: 2.03 M/UL (ref 4–5.2)
SODIUM SERPL-SCNC: 126 MMOL/L (ref 136–145)
SODIUM SERPL-SCNC: 128 MMOL/L (ref 136–145)
SODIUM SERPL-SCNC: 128 MMOL/L (ref 136–145)
WBC # BLD AUTO: 8.1 K/UL (ref 4–11)

## 2024-01-22 PROCEDURE — 36415 COLL VENOUS BLD VENIPUNCTURE: CPT

## 2024-01-22 PROCEDURE — 2580000003 HC RX 258: Performed by: INTERNAL MEDICINE

## 2024-01-22 PROCEDURE — 49083 ABD PARACENTESIS W/IMAGING: CPT

## 2024-01-22 PROCEDURE — 6360000002 HC RX W HCPCS: Performed by: INTERNAL MEDICINE

## 2024-01-22 PROCEDURE — 6370000000 HC RX 637 (ALT 250 FOR IP): Performed by: INTERNAL MEDICINE

## 2024-01-22 PROCEDURE — 84295 ASSAY OF SERUM SODIUM: CPT

## 2024-01-22 PROCEDURE — 0W9G3ZZ DRAINAGE OF PERITONEAL CAVITY, PERCUTANEOUS APPROACH: ICD-10-PCS | Performed by: STUDENT IN AN ORGANIZED HEALTH CARE EDUCATION/TRAINING PROGRAM

## 2024-01-22 PROCEDURE — 85025 COMPLETE CBC W/AUTO DIFF WBC: CPT

## 2024-01-22 PROCEDURE — 6370000000 HC RX 637 (ALT 250 FOR IP): Performed by: PHYSICIAN ASSISTANT

## 2024-01-22 PROCEDURE — 6370000000 HC RX 637 (ALT 250 FOR IP): Performed by: OTOLARYNGOLOGY

## 2024-01-22 PROCEDURE — 82140 ASSAY OF AMMONIA: CPT

## 2024-01-22 PROCEDURE — C1729 CATH, DRAINAGE: HCPCS

## 2024-01-22 PROCEDURE — 80076 HEPATIC FUNCTION PANEL: CPT

## 2024-01-22 PROCEDURE — 80048 BASIC METABOLIC PNL TOTAL CA: CPT

## 2024-01-22 PROCEDURE — 2060000000 HC ICU INTERMEDIATE R&B

## 2024-01-22 PROCEDURE — 2500000003 HC RX 250 WO HCPCS: Performed by: INTERNAL MEDICINE

## 2024-01-22 RX ORDER — FUROSEMIDE 20 MG/1
20 TABLET ORAL 2 TIMES DAILY
Status: DISCONTINUED | OUTPATIENT
Start: 2024-01-22 | End: 2024-01-23 | Stop reason: HOSPADM

## 2024-01-22 RX ORDER — LIDOCAINE HYDROCHLORIDE 10 MG/ML
10 INJECTION, SOLUTION EPIDURAL; INFILTRATION; INTRACAUDAL; PERINEURAL ONCE
Status: DISCONTINUED | OUTPATIENT
Start: 2024-01-22 | End: 2024-01-22 | Stop reason: ALTCHOICE

## 2024-01-22 RX ADMIN — BACITRACIN ZINC AND POLYMYXIN B SULFATE: at 21:00

## 2024-01-22 RX ADMIN — Medication 2 SPRAY: at 14:06

## 2024-01-22 RX ADMIN — Medication 2 SPRAY: at 19:50

## 2024-01-22 RX ADMIN — LIDOCAINE HYDROCHLORIDE 10 ML: 10 INJECTION, SOLUTION EPIDURAL; INFILTRATION; INTRACAUDAL; PERINEURAL at 14:39

## 2024-01-22 RX ADMIN — PANTOPRAZOLE SODIUM 40 MG: 40 TABLET, DELAYED RELEASE ORAL at 07:30

## 2024-01-22 RX ADMIN — RIFAXIMIN 550 MG: 550 TABLET ORAL at 20:59

## 2024-01-22 RX ADMIN — Medication 2 SPRAY: at 09:50

## 2024-01-22 RX ADMIN — Medication 2 SPRAY: at 05:11

## 2024-01-22 RX ADMIN — Medication 2 SPRAY: at 12:07

## 2024-01-22 RX ADMIN — MIDODRINE HYDROCHLORIDE 5 MG: 5 TABLET ORAL at 12:07

## 2024-01-22 RX ADMIN — SODIUM CHLORIDE, PRESERVATIVE FREE 10 ML: 5 INJECTION INTRAVENOUS at 08:18

## 2024-01-22 RX ADMIN — Medication 2 SPRAY: at 00:44

## 2024-01-22 RX ADMIN — Medication 2 SPRAY: at 07:31

## 2024-01-22 RX ADMIN — MIDODRINE HYDROCHLORIDE 5 MG: 5 TABLET ORAL at 08:17

## 2024-01-22 RX ADMIN — Medication 2 SPRAY: at 17:19

## 2024-01-22 RX ADMIN — FUROSEMIDE 20 MG: 20 TABLET ORAL at 17:19

## 2024-01-22 RX ADMIN — BACITRACIN ZINC AND POLYMYXIN B SULFATE: at 00:43

## 2024-01-22 RX ADMIN — Medication 2 SPRAY: at 15:34

## 2024-01-22 RX ADMIN — Medication 15 G: at 20:59

## 2024-01-22 RX ADMIN — FOLIC ACID 1 MG: 1 TABLET ORAL at 08:18

## 2024-01-22 RX ADMIN — MIDODRINE HYDROCHLORIDE 5 MG: 5 TABLET ORAL at 17:19

## 2024-01-22 RX ADMIN — TRAMADOL HYDROCHLORIDE 50 MG: 50 TABLET ORAL at 21:00

## 2024-01-22 RX ADMIN — FUROSEMIDE 40 MG: 40 TABLET ORAL at 08:18

## 2024-01-22 RX ADMIN — CARVEDILOL 3.12 MG: 3.12 TABLET, FILM COATED ORAL at 08:18

## 2024-01-22 RX ADMIN — RIFAXIMIN 550 MG: 550 TABLET ORAL at 08:16

## 2024-01-22 RX ADMIN — CARVEDILOL 3.12 MG: 3.12 TABLET, FILM COATED ORAL at 17:19

## 2024-01-22 RX ADMIN — Medication 2 SPRAY: at 21:00

## 2024-01-22 RX ADMIN — DEXTROAMPHETAMINE SACCHARATE, AMPHETAMINE ASPARTATE MONOHYDRATE, DEXTROAMPHETAMINE SULFATE AND AMPHETAMINE SULFATE 20 MG: 2.5; 2.5; 2.5; 2.5 CAPSULE, EXTENDED RELEASE ORAL at 07:33

## 2024-01-22 RX ADMIN — ONDANSETRON 4 MG: 2 INJECTION INTRAMUSCULAR; INTRAVENOUS at 09:51

## 2024-01-22 RX ADMIN — TRAMADOL HYDROCHLORIDE 50 MG: 50 TABLET ORAL at 08:16

## 2024-01-22 RX ADMIN — Medication 30 G: at 08:17

## 2024-01-22 RX ADMIN — CEFTRIAXONE SODIUM 1000 MG: 1 INJECTION, POWDER, FOR SOLUTION INTRAMUSCULAR; INTRAVENOUS at 17:31

## 2024-01-22 RX ADMIN — FERROUS SULFATE TAB 325 MG (65 MG ELEMENTAL FE) 325 MG: 325 (65 FE) TAB at 08:18

## 2024-01-22 RX ADMIN — SODIUM CHLORIDE, PRESERVATIVE FREE 10 ML: 5 INJECTION INTRAVENOUS at 21:01

## 2024-01-22 ASSESSMENT — PAIN DESCRIPTION - ORIENTATION
ORIENTATION: RIGHT

## 2024-01-22 ASSESSMENT — ENCOUNTER SYMPTOMS
EYE DISCHARGE: 0
SHORTNESS OF BREATH: 0
CONSTIPATION: 0
CHEST TIGHTNESS: 0
PHOTOPHOBIA: 0
ABDOMINAL PAIN: 0
VOMITING: 0
FACIAL SWELLING: 0
NAUSEA: 0
ABDOMINAL DISTENTION: 1
COUGH: 0
EYE REDNESS: 0
DIARRHEA: 0
BLOOD IN STOOL: 0

## 2024-01-22 ASSESSMENT — PAIN DESCRIPTION - ONSET
ONSET: OTHER (COMMENT)
ONSET: OTHER (COMMENT)

## 2024-01-22 ASSESSMENT — PAIN DESCRIPTION - LOCATION
LOCATION: RIB CAGE
LOCATION: ABDOMEN

## 2024-01-22 ASSESSMENT — PAIN SCALES - GENERAL
PAINLEVEL_OUTOF10: 2
PAINLEVEL_OUTOF10: 6
PAINLEVEL_OUTOF10: 7
PAINLEVEL_OUTOF10: 6
PAINLEVEL_OUTOF10: 6
PAINLEVEL_OUTOF10: 2

## 2024-01-22 ASSESSMENT — PAIN DESCRIPTION - DESCRIPTORS
DESCRIPTORS: DISCOMFORT
DESCRIPTORS: DISCOMFORT
DESCRIPTORS: ACHING
DESCRIPTORS: ACHING

## 2024-01-22 ASSESSMENT — PAIN DESCRIPTION - FREQUENCY
FREQUENCY: INTERMITTENT
FREQUENCY: INTERMITTENT

## 2024-01-22 NOTE — CARE COORDINATION
CM to bedside Patient Mother requesting proof of presence to hospital for this visit and previous visit on 1/06/2024, using communications application under work note, CM created proof of presence letter.          January 22, 2024       Angélica Gonzalez YOB: 1985   [address] [facility] [address] Date of Visit:  1/17/2024       To Whom It May Concern:    Patient was admitted to [facility] on 1/16/2024 and Discharged home on 1/13/2024.   Patient was admitted to [facility] on 1/17/2023 and presently still admitted pending discharge planned for home.     If you have any questions or concerns, please don't hesitate to call.    Sincerely,        Willa Wade CM   [phone]     Electronically signed by Kesha Wade on 1/22/2024 at 2:25 PM

## 2024-01-22 NOTE — PROGRESS NOTES
[facility] Note    Patient Active Problem List   Diagnosis    Dizziness    Hyponatremia    Decompensated hepatic cirrhosis (HCC)    Ascites due to alcoholic cirrhosis (HCC)    Anemia    S/P abdominal paracentesis    Esophageal varices (HCC)    GERD (gastroesophageal reflux disease)           Past Medical History:   has a past medical history of ADHD, Cirrhosis (HCC), Dyslexia, ETOH abuse, Hepatic cirrhosis (HCC), and History of blood transfusion.    Past Social History:   reports that she has never smoked. She has never used smokeless tobacco. She reports that she does not currently use alcohol. She reports that she does not use drugs.    Subjective:    Received Samsca yesterday  No new neurologic symptoms  Wants to go home    Review of Systems   Constitutional:  Positive for fatigue. Negative for activity change, appetite change, chills, fever and unexpected weight change.   HENT:  Negative for congestion and facial swelling.    Eyes:  Negative for photophobia, discharge and redness.   Respiratory:  Negative for cough, chest tightness and shortness of breath.    Cardiovascular:  Positive for leg swelling. Negative for chest pain and palpitations.   Gastrointestinal:  Positive for abdominal distention. Negative for abdominal pain, blood in stool, constipation, diarrhea, nausea and vomiting.   Endocrine: Negative for cold intolerance, heat intolerance and polyuria.   Genitourinary:  Negative for decreased urine volume, difficulty urinating, flank pain and hematuria.   Musculoskeletal:  Negative for joint swelling and neck pain.   Neurological:  Negative for dizziness, seizures, syncope, speech difficulty, light-headedness and headaches.   Hematological:  Does not bruise/bleed easily.   Psychiatric/Behavioral:  Negative for agitation, confusion and hallucinations.        Objective:      /60   Pulse 80   Temp 98.1 °F (36.7 °C) (Oral)   Resp 18   Ht 1.651 m (5' 5\")   Wt 81 kg

## 2024-01-22 NOTE — PROGRESS NOTES
Progress Note - Dr. Potter - Internal Medicine  PCP: Dee Dee Pinon [address] [facility], [place] [address] [phone]    Hospital Day: 5  Code Status: Full Code  Current Diet: ADULT DIET; Regular; Low Sodium (2 gm); 1500 ml  ADULT ORAL NUTRITION SUPPLEMENT; Breakfast, Lunch, Dinner; Standard 4 oz Oral Supplement        CC: follow up on medical issues    Subjective:   Angélica Gonzalez is a 38 y.o. female.    Pt seen and examined  Chart reviewed since last visit, labs and imaging below    Anemia stable after transfusion, hgb 7.9  Na still quite low, 126  Has received samsca    2.1L out in paracentesis 1/19  May require repeat paracentesis - awaiting GI recs today    Review of Systems: (1 system for EPF, 2-9 for detailed, 10+ for comprehensive)  Constitutional: Negative for chills and fever.     HENT: Negative for dental problem, and rhinorrhea.    Positive for epistaxis  Eyes: Negative for photophobia and visual disturbance.     Respiratory: Negative for cough, chest tightness and shortness of breath.      Cardiovascular: Negative for chest pain and leg swelling.     Gastrointestinal: Negative for diarrhea, positive for nausea and vomiting.     Endocrine: Negative for polydipsia and polyphagia.     Genitourinary: Negative for frequency, hematuria and urgency.     Musculoskeletal: Negative for back pain and myalgias.     Skin: Negative for rash.   Positive for jaundice  Allergic/Immunologic: Negative for food allergies.     Neurological: Negative for dizziness, seizures, syncope and facial asymmetry.     Hematological: Negative for adenopathy.     Psychiatric/Behavioral: Negative for dysphoric mood. The patient is not nervous/anxious.        I have reviewed the patient's medical and social history in detail and updated the computerized patient record.  To recap: She  has a past medical history of ADHD, Cirrhosis (HCC), Dyslexia, ETOH abuse, Hepatic cirrhosis (HCC), and History of blood transfusion.. She  has

## 2024-01-22 NOTE — BRIEF OP NOTE
Brief Postoperative Note    Angélica Gonzalez  YOB: 1985  [id number]    Pre-operative Diagnosis: ascites    Post-operative Diagnosis: Same    Procedure: US-guided paracentesis    Anesthesia: Local    Surgeons: Kermit Espino MD    Estimated Blood Loss: Less than 5 mL    Complications: None    Specimens: ascites drained    Findings: Successful US-guided paracentesis.    Electronically signed by Kermit Espino MD on 1/22/2024 at 2:45 PM

## 2024-01-22 NOTE — PROGRESS NOTES
Nutrition Note    RECOMMENDATIONS  PO Diet: Continue current diet   ONS: Continue Ensure Compact  Nutrition Support: None     NUTRITION ASSESSMENT   Pt reports appetite and PO intake are improving since admission. PO intake at most meals has been documented as %. Did not like lunch today; eating bread and butter upon RD visit. Pt likes Ensure Compact; encouraged her to drink these in between meals to avoid early satiety at meals. Reviewed low Na+ diet and fluid restriction guidelines again and answered questions for pt and mother at bedside. Pt is following a low sodium diet at home and even tracking intake and analyzing recipes being made at home.     Nutrition Related Findings: LBM 1/19. No edema noted. Jaundice. Na+ 126. Paracentesis 1/19 with 2.1 liters removed.  Wounds: None  Nutrition Education:  Education completed   Nutrition Goals: PO intake 50% or greater     MALNUTRITION ASSESSMENT   Malnutrition Status: No malnutrition    NUTRITION DIAGNOSIS   Inadequate oral intake related to altered GI function, early satiety as evidenced by poor intake prior to admission (PO intake varies depending on ascites; needs frequent paracentesis)    CURRENT NUTRITION THERAPIES  ADULT DIET; Regular; Low Sodium (2 gm); 1500 ml  ADULT ORAL NUTRITION SUPPLEMENT; Breakfast, Lunch, Dinner; Standard 4 oz Oral Supplement     PO Intake: 51-75%, %   PO Supplement Intake:%    ANTHROPOMETRICS  Current Height: 165.1 cm (5' 5\")  Current Weight - Scale: 81 kg (178 lb 8 oz)    Ideal Body Weight (IBW): 125 lbs  (57 kg)        BMI: 29.7    COMPARATIVE STANDARDS  Total Energy Requirements (kcals/day): 8186-2572     Protein (g):  68- 114g       Fluid (mL/day):  5296-2629 mL    The patient will be monitored per nutrition standards of care. Consult dietitian if additional nutrition interventions are needed prior to RD reassessment.     Marta Anderson, MS, RD, LD    Contact: [phone]

## 2024-01-23 VITALS
WEIGHT: 179 LBS | HEIGHT: 65 IN | OXYGEN SATURATION: 100 % | TEMPERATURE: 97.8 F | SYSTOLIC BLOOD PRESSURE: 95 MMHG | BODY MASS INDEX: 29.82 KG/M2 | DIASTOLIC BLOOD PRESSURE: 58 MMHG | RESPIRATION RATE: 20 BRPM | HEART RATE: 75 BPM

## 2024-01-23 LAB
ABO + RH BLD: NORMAL
ANION GAP SERPL CALCULATED.3IONS-SCNC: 7 MMOL/L (ref 3–16)
BASOPHILS # BLD: 0 K/UL (ref 0–0.2)
BASOPHILS NFR BLD: 0.5 %
BLD GP AB SCN SERPL QL: NORMAL
BLOOD BANK DISPENSE STATUS: NORMAL
BLOOD BANK PRODUCT CODE: NORMAL
BPU ID: NORMAL
BUN SERPL-MCNC: 40 MG/DL (ref 7–20)
CALCIUM SERPL-MCNC: 8.9 MG/DL (ref 8.3–10.6)
CHLORIDE SERPL-SCNC: 94 MMOL/L (ref 99–110)
CO2 SERPL-SCNC: 26 MMOL/L (ref 21–32)
CREAT SERPL-MCNC: <0.5 MG/DL (ref 0.6–1.1)
DEPRECATED RDW RBC AUTO: 21.5 % (ref 12.4–15.4)
DESCRIPTION BLOOD BANK: NORMAL
EOSINOPHIL # BLD: 0.4 K/UL (ref 0–0.6)
EOSINOPHIL NFR BLD: 3.9 %
GFR SERPLBLD CREATININE-BSD FMLA CKD-EPI: >60 ML/MIN/{1.73_M2}
GLUCOSE SERPL-MCNC: 132 MG/DL (ref 70–99)
HCT VFR BLD AUTO: 18.4 % (ref 36–48)
HCT VFR BLD AUTO: 21 % (ref 36–48)
HGB BLD-MCNC: 6.6 G/DL (ref 12–16)
HGB BLD-MCNC: 7.6 G/DL (ref 12–16)
LYMPHOCYTES # BLD: 1.3 K/UL (ref 1–5.1)
LYMPHOCYTES NFR BLD: 14.7 %
MCH RBC QN AUTO: 38.4 PG (ref 26–34)
MCHC RBC AUTO-ENTMCNC: 35.8 G/DL (ref 31–36)
MCV RBC AUTO: 107.4 FL (ref 80–100)
MONOCYTES # BLD: 1.3 K/UL (ref 0–1.3)
MONOCYTES NFR BLD: 13.9 %
NEUTROPHILS # BLD: 6.1 K/UL (ref 1.7–7.7)
NEUTROPHILS NFR BLD: 67 %
PLATELET # BLD AUTO: 79 K/UL (ref 135–450)
PMV BLD AUTO: 6.6 FL (ref 5–10.5)
POTASSIUM SERPL-SCNC: 4.9 MMOL/L (ref 3.5–5.1)
RBC # BLD AUTO: 1.72 M/UL (ref 4–5.2)
SODIUM SERPL-SCNC: 127 MMOL/L (ref 136–145)
SODIUM SERPL-SCNC: 127 MMOL/L (ref 136–145)
WBC # BLD AUTO: 9.1 K/UL (ref 4–11)

## 2024-01-23 PROCEDURE — 6370000000 HC RX 637 (ALT 250 FOR IP): Performed by: INTERNAL MEDICINE

## 2024-01-23 PROCEDURE — 80048 BASIC METABOLIC PNL TOTAL CA: CPT

## 2024-01-23 PROCEDURE — 85025 COMPLETE CBC W/AUTO DIFF WBC: CPT

## 2024-01-23 PROCEDURE — P9016 RBC LEUKOCYTES REDUCED: HCPCS

## 2024-01-23 PROCEDURE — 86900 BLOOD TYPING SEROLOGIC ABO: CPT

## 2024-01-23 PROCEDURE — 36430 TRANSFUSION BLD/BLD COMPNT: CPT

## 2024-01-23 PROCEDURE — 36415 COLL VENOUS BLD VENIPUNCTURE: CPT

## 2024-01-23 PROCEDURE — 6370000000 HC RX 637 (ALT 250 FOR IP): Performed by: PHYSICIAN ASSISTANT

## 2024-01-23 PROCEDURE — 85014 HEMATOCRIT: CPT

## 2024-01-23 PROCEDURE — 86901 BLOOD TYPING SEROLOGIC RH(D): CPT

## 2024-01-23 PROCEDURE — 84295 ASSAY OF SERUM SODIUM: CPT

## 2024-01-23 PROCEDURE — 85018 HEMOGLOBIN: CPT

## 2024-01-23 PROCEDURE — 86923 COMPATIBILITY TEST ELECTRIC: CPT

## 2024-01-23 PROCEDURE — 86850 RBC ANTIBODY SCREEN: CPT

## 2024-01-23 RX ORDER — LACTULOSE 10 G/15ML
20 SOLUTION ORAL 3 TIMES DAILY
Status: DISCONTINUED | OUTPATIENT
Start: 2024-01-23 | End: 2024-01-23 | Stop reason: HOSPADM

## 2024-01-23 RX ORDER — MIDODRINE HYDROCHLORIDE 5 MG/1
5 TABLET ORAL
Qty: 90 TABLET | Refills: 0 | Status: SHIPPED | OUTPATIENT
Start: 2024-01-23 | End: 2024-01-23

## 2024-01-23 RX ORDER — SODIUM CHLORIDE 9 MG/ML
INJECTION, SOLUTION INTRAVENOUS PRN
Status: DISCONTINUED | OUTPATIENT
Start: 2024-01-23 | End: 2024-01-23 | Stop reason: HOSPADM

## 2024-01-23 RX ORDER — MIDODRINE HYDROCHLORIDE 5 MG/1
5 TABLET ORAL
Qty: 90 TABLET | Refills: 0 | Status: SHIPPED | OUTPATIENT
Start: 2024-01-23

## 2024-01-23 RX ADMIN — Medication 2 SPRAY: at 14:50

## 2024-01-23 RX ADMIN — LACTULOSE 20 G: 20 SOLUTION ORAL at 11:35

## 2024-01-23 RX ADMIN — DEXTROAMPHETAMINE SACCHARATE, AMPHETAMINE ASPARTATE MONOHYDRATE, DEXTROAMPHETAMINE SULFATE AND AMPHETAMINE SULFATE 20 MG: 2.5; 2.5; 2.5; 2.5 CAPSULE, EXTENDED RELEASE ORAL at 08:50

## 2024-01-23 RX ADMIN — Medication 2 SPRAY: at 11:34

## 2024-01-23 RX ADMIN — RIFAXIMIN 550 MG: 550 TABLET ORAL at 08:27

## 2024-01-23 RX ADMIN — FERROUS SULFATE TAB 325 MG (65 MG ELEMENTAL FE) 325 MG: 325 (65 FE) TAB at 08:19

## 2024-01-23 RX ADMIN — FUROSEMIDE 20 MG: 20 TABLET ORAL at 08:19

## 2024-01-23 RX ADMIN — Medication 2 SPRAY: at 09:40

## 2024-01-23 RX ADMIN — FOLIC ACID 1 MG: 1 TABLET ORAL at 08:19

## 2024-01-23 RX ADMIN — MIDODRINE HYDROCHLORIDE 5 MG: 5 TABLET ORAL at 11:35

## 2024-01-23 RX ADMIN — CARVEDILOL 3.12 MG: 3.12 TABLET, FILM COATED ORAL at 08:19

## 2024-01-23 RX ADMIN — PANTOPRAZOLE SODIUM 40 MG: 40 TABLET, DELAYED RELEASE ORAL at 06:32

## 2024-01-23 RX ADMIN — Medication 2 SPRAY: at 08:20

## 2024-01-23 RX ADMIN — TRAMADOL HYDROCHLORIDE 50 MG: 50 TABLET ORAL at 08:27

## 2024-01-23 RX ADMIN — Medication 15 G: at 08:27

## 2024-01-23 RX ADMIN — LACTULOSE 20 G: 20 SOLUTION ORAL at 14:50

## 2024-01-23 RX ADMIN — MIDODRINE HYDROCHLORIDE 5 MG: 5 TABLET ORAL at 08:19

## 2024-01-23 ASSESSMENT — ENCOUNTER SYMPTOMS
DIARRHEA: 0
EYE REDNESS: 0
PHOTOPHOBIA: 0
COUGH: 0
EYE DISCHARGE: 0
NAUSEA: 0
ABDOMINAL DISTENTION: 1
CONSTIPATION: 0
FACIAL SWELLING: 0
ABDOMINAL PAIN: 0
BLOOD IN STOOL: 0
CHEST TIGHTNESS: 0
SHORTNESS OF BREATH: 0
VOMITING: 0

## 2024-01-23 ASSESSMENT — PAIN DESCRIPTION - ORIENTATION
ORIENTATION: RIGHT
ORIENTATION: RIGHT

## 2024-01-23 ASSESSMENT — PAIN SCALES - GENERAL
PAINLEVEL_OUTOF10: 7
PAINLEVEL_OUTOF10: 0
PAINLEVEL_OUTOF10: 0
PAINLEVEL_OUTOF10: 5
PAINLEVEL_OUTOF10: 5

## 2024-01-23 ASSESSMENT — PAIN DESCRIPTION - LOCATION
LOCATION: RIB CAGE
LOCATION: RIB CAGE

## 2024-01-23 ASSESSMENT — PAIN DESCRIPTION - DESCRIPTORS: DESCRIPTORS: ACHING

## 2024-01-23 NOTE — PLAN OF CARE
Problem: Discharge Planning  Goal: Discharge to home or other facility with appropriate resources  Outcome: Progressing  Flowsheets (Taken 1/22/2024 1945)  Discharge to home or other facility with appropriate resources:   Identify barriers to discharge with patient and caregiver   Arrange for needed discharge resources and transportation as appropriate   Identify discharge learning needs (meds, wound care, etc)   Refer to discharge planning if patient needs post-hospital services based on physician order or complex needs related to functional status, cognitive ability or social support system     Problem: Gastrointestinal - Adult  Goal: Minimal or absence of nausea and vomiting  Outcome: Progressing  Flowsheets (Taken 1/22/2024 1945)  Minimal or absence of nausea and vomiting:   Administer IV fluids as ordered to ensure adequate hydration   Administer ordered antiemetic medications as needed   Provide nonpharmacologic comfort measures as appropriate   Advance diet as tolerated, if ordered   Nutrition consult to assist patient with adequate nutrition and appropriate food choices  Goal: Maintains adequate nutritional intake  Outcome: Progressing  Flowsheets (Taken 1/22/2024 1945)  Maintains adequate nutritional intake:   Monitor percentage of each meal consumed   Identify factors contributing to decreased intake, treat as appropriate   Assist with meals as needed   Monitor intake and output, weight and lab values   Obtain nutritional consult as needed     Problem: Metabolic/Fluid and Electrolytes - Adult  Goal: Electrolytes maintained within normal limits  Outcome: Progressing  Flowsheets (Taken 1/22/2024 1945)  Electrolytes maintained within normal limits:   Administer electrolyte replacement as ordered   Monitor labs and assess patient for signs and symptoms of electrolyte imbalances   Monitor response to electrolyte replacements, including repeat lab results as appropriate   Instruct patient on fluid and

## 2024-01-23 NOTE — DISCHARGE SUMMARY
mL, IntraVENous, 2 times per day  sodium chloride flush 0.9 % injection 5-40 mL, 5-40 mL, IntraVENous, PRN  0.9 % sodium chloride infusion, , IntraVENous, PRN  potassium chloride (KLOR-CON M) extended release tablet 40 mEq, 40 mEq, Oral, PRN **OR** potassium bicarb-citric acid (EFFER-K) effervescent tablet 40 mEq, 40 mEq, Oral, PRN **OR** potassium chloride 10 mEq/100 mL IVPB (Peripheral Line), 10 mEq, IntraVENous, PRN  enoxaparin (LOVENOX) injection 40 mg, 40 mg, SubCUTAneous, Daily  ondansetron (ZOFRAN-ODT) disintegrating tablet 4 mg, 4 mg, Oral, Q8H PRN **OR** ondansetron (ZOFRAN) injection 4 mg, 4 mg, IntraVENous, Q6H PRN  magnesium hydroxide (MILK OF MAGNESIA) 400 MG/5ML suspension 30 mL, 30 mL, Oral, Daily PRN  acetaminophen (TYLENOL) tablet 650 mg, 650 mg, Oral, Q6H PRN **OR** acetaminophen (TYLENOL) suppository 650 mg, 650 mg, Rectal, Q6H PRN  hydrALAZINE (APRESOLINE) injection 10 mg, 10 mg, IntraVENous, Q6H PRN  sodium chloride 0.9 % bolus 500 mL, 500 mL, IntraVENous, PRN  cefTRIAXone (ROCEPHIN) 1,000 mg in sodium chloride 0.9 % 50 mL IVPB (mini-bag), 1,000 mg, IntraVENous, Q24H         Objective (exam): see above    Labs at time of d/c:  Lab Results   Component Value Date    WBC 9.1 01/23/2024    HGB 6.6 (LL) 01/23/2024    HCT 18.4 (LL) 01/23/2024    PLT 79 (L) 01/23/2024    ALT 21 01/22/2024    AST 52 (H) 01/22/2024     (L) 01/23/2024    K 4.9 01/23/2024    CL 94 (L) 01/23/2024    CREATININE <0.5 (L) 01/23/2024    BUN 40 (H) 01/23/2024    CO2 26 01/23/2024    INR 2.76 (H) 01/17/2024     No results found for: \"CKTOTAL\", \"CKMB\", \"CKMBINDEX\", \"TROPONINI\"    (Please note that portions of this note were completed with a voice recognition program.  Efforts were made to edit the dictations but occasionally words are mis-transcribed.)      Signed:  Mikael Potter MD  1/23/2024    Please use Perfect[employer]erve to contact me with any questions during the day.   The hospitalist service will provide cross-coverage  for this patient from 7pm to 7am.    During those hours, contact the on-call hospitalist MD/AMANDA for questions.

## 2024-01-23 NOTE — PROGRESS NOTES
Lab called with critical results. Hemoglobin 6.6 and hematocrit 18.4. MD notified via perfect serve.

## 2024-01-23 NOTE — PROGRESS NOTES
[facility] Note    Patient Active Problem List   Diagnosis    Dizziness    Hyponatremia    Decompensated hepatic cirrhosis (HCC)    Ascites due to alcoholic cirrhosis (HCC)    Anemia    S/P abdominal paracentesis    Esophageal varices (HCC)    GERD (gastroesophageal reflux disease)           Past Medical History:   has a past medical history of ADHD, Cirrhosis (HCC), Dyslexia, ETOH abuse, Hepatic cirrhosis (HCC), and History of blood transfusion.    Past Social History:   reports that she has never smoked. She has never used smokeless tobacco. She reports that she does not currently use alcohol. She reports that she does not use drugs.    Subjective:    Received Samsca 1/21/24  No new neurologic symptoms  Wants to go home    Review of Systems   Constitutional:  Positive for fatigue. Negative for activity change, appetite change, chills, fever and unexpected weight change.   HENT:  Negative for congestion and facial swelling.    Eyes:  Negative for photophobia, discharge and redness.   Respiratory:  Negative for cough, chest tightness and shortness of breath.    Cardiovascular:  Positive for leg swelling. Negative for chest pain and palpitations.   Gastrointestinal:  Positive for abdominal distention. Negative for abdominal pain, blood in stool, constipation, diarrhea, nausea and vomiting.   Endocrine: Negative for cold intolerance, heat intolerance and polyuria.   Genitourinary:  Negative for decreased urine volume, difficulty urinating, flank pain and hematuria.   Musculoskeletal:  Negative for joint swelling and neck pain.   Neurological:  Negative for dizziness, seizures, syncope, speech difficulty, light-headedness and headaches.   Hematological:  Does not bruise/bleed easily.   Psychiatric/Behavioral:  Negative for agitation, confusion and hallucinations.        Objective:      BP (!) 95/56   Pulse 73   Temp 98 °F (36.7 °C) (Oral)   Resp 18   Ht 1.651 m (5' 5\")   Wt 81.2 kg

## 2024-01-23 NOTE — PROGRESS NOTES
Gastroenterology Progress Note            Angélica Gonzalez is a 38 y.o. female patient.  Principal Problem:    Hyponatremia  Active Problems:    Decompensated hepatic cirrhosis (HCC)    Ascites due to alcoholic cirrhosis (HCC)    Anemia    GERD (gastroesophageal reflux disease)  Resolved Problems:    * No resolved hospital problems. *      SUBJECTIVE:  Pt sleeping but awakens.        Physical    VITALS:  BP (!) 95/56   Pulse 73   Temp 98 °F (36.7 °C) (Oral)   Resp 18   Ht 1.651 m (5' 5\")   Wt 81.2 kg (179 lb)   SpO2 98%   BMI 29.79 kg/m²   TEMPERATURE:  Current - Temp: 98 °F (36.7 °C); Max - Temp  Av.8 °F (36.6 °C)  Min: 97.5 °F (36.4 °C)  Max: 98 °F (36.7 °C)    NAD  RRR, Nl s1s2  Lungs CTA Bilaterally, normal effort  Abdomen protuberant but soft, NT, no HSM, Bowel sounds normal.  Neuro: drowsy but awakens. Ox4, no asterixis.  Jaundice    Data    Data Review:    Recent Labs     248 24  0241   WBC 8.5 8.1 9.1   HGB 7.7* 7.9* 6.6*   HCT 21.2* 21.7* 18.4*   .6* 107.2* 107.4*   PLT 72* 75* 79*       Recent Labs     24  0946 248 24  1020 24  1848 24  0241   *  124*   < > 126* 128* 128* 127*   K 4.3  --  4.3  --   --  4.9   CL 92*  --  93*  --   --  94*   CO2   --   --  26   BUN 42*  --  44*  --   --  40*   CREATININE <0.5*  --  <0.5*  --   --  <0.5*    < > = values in this interval not displayed.       Recent Labs     24  0428   AST 51* 52*   ALT 20 21   BILIDIR 5.3* 5.5*   BILITOT 13.9* 14.5*   ALKPHOS 169* 152*       No results for input(s): \"LIPASE\", \"AMYLASE\" in the last 72 hours.  No results for input(s): \"PROTIME\", \"INR\" in the last 72 hours.  No results for input(s): \"PTT\" in the last 72 hours.     EGD 11/15/2023: small esophageal varices and mild portal hypertensive gastropathy.     ASSESSMENT :     Cirrhosis due to alcohol -complicated mainly by Ascites  TOMI Cohen  [facility]    I have personally performed a face to face diagnostic evaluation on this patient.  I have interviewed and examined the patient and I agree with the findings and recommended plan of care.  In summary, my findings and plan are the following: recalled for elevated ammonia yesterday. When I rounded today pt was ambulating in room packing her items for dc home and showed no signs clinical enceph. No hematemesis, no gi bleeding, no abd pain, no confusion. On exam awake/alert/answers questions appropriately and accurately, lungs clear, cv reg, abd distended but soft/nontender, no lower ext edema and only minimal/trace asterixis. Bili improved from prior, creat nl, hyponatremia stable, prbc given for hbg 6 this am but hbg 6 also on 1/19/24 and incremented with prbc, No sign gi bleeding, stool brown/green, no hematemesis, egd nov 2023 only small varices and mild portal htn gastropathy so low risk; also did have epistaxis evaluated by ENT who rec humidified air. Pt has intial MD visit with Dr Alexander at  transplant hepatology tomorrow; with no sign clinical gi bleed and recent egd and recent epistaxis it seems Dr Alexander appointment should take precedence over repeating egd's so ok dc home today from my standpoint to f/u Dr Alexander tomorrow; I called Dr Alexander and updated him as well. Otherwise xifaxan and lactulose to avoid enceph (mentating well today so ok for dc home) and diuretics per nephrology and 2 gm na diet for ascities and cont off etoh (has been abstinent multipe months per dr gann initial consult note).   Otherwise will sign off, call if needed.     Constantino Wheat MD  [facility]

## 2024-01-23 NOTE — PLAN OF CARE
Problem: Discharge Planning  Goal: Discharge to home or other facility with appropriate resources  1/23/2024 0933 by Kamille Sam RN  Outcome: Progressing     Problem: Gastrointestinal - Adult  Goal: Minimal or absence of nausea and vomiting  1/23/2024 0933 by Kamille Sam RN  Outcome: Progressing     Problem: Gastrointestinal - Adult  Goal: Maintains adequate nutritional intake  1/23/2024 0933 by Kamille Sam RN  Outcome: Progressing     Problem: Metabolic/Fluid and Electrolytes - Adult  Goal: Electrolytes maintained within normal limits  1/23/2024 0933 by Kamille Sam RN  Outcome: Progressing     Problem: Metabolic/Fluid and Electrolytes - Adult  Goal: Hemodynamic stability and optimal renal function maintained  1/23/2024 0933 by Kamille Sam RN  Outcome: Progressing     Problem: Skin/Tissue Integrity - Adult  Goal: Skin integrity remains intact  1/23/2024 0933 by Kamille Sam RN  Outcome: Progressing     Problem: Musculoskeletal - Adult  Goal: Return mobility to safest level of function  1/23/2024 0933 by Kamille Sam RN  Outcome: Progressing     Problem: Hematologic - Adult  Goal: Maintains hematologic stability  1/23/2024 0933 by Kamille Sam RN  Outcome: Progressing     Problem: Nutrition Deficit:  Goal: Optimize nutritional status  1/23/2024 0933 by Kamille Sam RN  Outcome: Progressing     Problem: Safety - Adult  Goal: Free from fall injury  1/23/2024 0933 by Kamille Sam RN  Outcome: Progressing     Problem: Pain  Goal: Verbalizes/displays adequate comfort level or baseline comfort level  1/23/2024 0933 by Kamille Sam RN  Outcome: Progressing

## 2024-01-24 LAB — BACTERIA SPEC ANAEROBE CULT: NORMAL

## 2024-01-27 ENCOUNTER — HOSPITAL ENCOUNTER (INPATIENT)
Age: 39
LOS: 5 days | Discharge: HOME | DRG: 433 | End: 2024-02-01
Attending: STUDENT IN AN ORGANIZED HEALTH CARE EDUCATION/TRAINING PROGRAM | Admitting: STUDENT IN AN ORGANIZED HEALTH CARE EDUCATION/TRAINING PROGRAM
Payer: COMMERCIAL

## 2024-01-27 ENCOUNTER — APPOINTMENT (OUTPATIENT)
Dept: INTERVENTIONAL RADIOLOGY/VASCULAR | Age: 39
DRG: 433 | End: 2024-01-27
Attending: STUDENT IN AN ORGANIZED HEALTH CARE EDUCATION/TRAINING PROGRAM
Payer: COMMERCIAL

## 2024-01-27 ENCOUNTER — APPOINTMENT (OUTPATIENT)
Dept: GENERAL RADIOLOGY | Age: 39
DRG: 433 | End: 2024-01-27
Attending: STUDENT IN AN ORGANIZED HEALTH CARE EDUCATION/TRAINING PROGRAM
Payer: COMMERCIAL

## 2024-01-27 PROBLEM — K76.82 HEPATIC ENCEPHALOPATHY (HCC): Status: ACTIVE | Noted: 2024-01-27

## 2024-01-27 LAB
ALBUMIN SERPL-MCNC: 3.7 G/DL (ref 3.4–5)
ALBUMIN/GLOB SERPL: 1.9 {RATIO} (ref 1.1–2.2)
ALP SERPL-CCNC: 198 U/L (ref 40–129)
ALT SERPL-CCNC: 24 U/L (ref 10–40)
ANION GAP SERPL CALCULATED.3IONS-SCNC: 14 MMOL/L (ref 3–16)
APPEARANCE FLUID: NORMAL
AST SERPL-CCNC: 59 U/L (ref 15–37)
BASE EXCESS BLDA CALC-SCNC: 1.8 MMOL/L (ref -3–3)
BASOPHILS # BLD: 0 K/UL (ref 0–0.2)
BASOPHILS NFR BLD: 0.3 %
BDY FLUID QUALITY: NORMAL
BILIRUB SERPL-MCNC: 17.3 MG/DL (ref 0–1)
BUN SERPL-MCNC: 26 MG/DL (ref 7–20)
CALCIUM SERPL-MCNC: 9 MG/DL (ref 8.3–10.6)
CELL COUNT FLUID TYPE: NORMAL
CHLORIDE SERPL-SCNC: 95 MMOL/L (ref 99–110)
CO2 BLDA-SCNC: 56.8 MMOL/L
CO2 SERPL-SCNC: 20 MMOL/L (ref 21–32)
COHGB MFR BLDA: 2.6 % (ref 0–1.5)
COLOR FLUID: YELLOW
CREAT SERPL-MCNC: <0.5 MG/DL (ref 0.6–1.1)
DEPRECATED RDW RBC AUTO: 26.5 % (ref 12.4–15.4)
EOSINOPHIL # BLD: 0.1 K/UL (ref 0–0.6)
EOSINOPHIL NFR BLD: 1.1 %
FOLATE SERPL-MCNC: >20 NG/ML (ref 4.78–24.2)
GFR SERPLBLD CREATININE-BSD FMLA CKD-EPI: >60 ML/MIN/{1.73_M2}
GLUCOSE BLD-MCNC: 122 MG/DL (ref 70–99)
GLUCOSE BLD-MCNC: 131 MG/DL (ref 70–99)
GLUCOSE BLD-MCNC: 132 MG/DL (ref 70–99)
GLUCOSE SERPL-MCNC: 121 MG/DL (ref 70–99)
HCO3 BLDA-SCNC: 24.4 MMOL/L (ref 21–29)
HCT VFR BLD AUTO: 22.3 % (ref 36–48)
HGB BLD-MCNC: 8.1 G/DL (ref 12–16)
HGB BLDA-MCNC: 7.8 G/DL (ref 12–16)
LYMPHOCYTES # BLD: 1.3 K/UL (ref 1–5.1)
LYMPHOCYTES NFR BLD: 13.8 %
LYMPHOCYTES NFR FLD: 37 %
MACROPHAGES # FLD: 16 %
MAGNESIUM SERPL-MCNC: 2.1 MG/DL (ref 1.8–2.4)
MCH RBC QN AUTO: 36.4 PG (ref 26–34)
MCHC RBC AUTO-ENTMCNC: 36.1 G/DL (ref 31–36)
MCV RBC AUTO: 100.8 FL (ref 80–100)
MESOTHL CELL NFR FLD: 2 %
METHGB MFR BLDA: 0.1 %
MONOCYTES # BLD: 0.8 K/UL (ref 0–1.3)
MONOCYTES NFR BLD: 8.8 %
MONOCYTES NFR FLD: 36 %
MONONUCLEAR UNIDENTIFIED CELLS FLUID: 1 %
NEUTROPHIL, FLUID: 8 %
NEUTROPHILS # BLD: 7.2 K/UL (ref 1.7–7.7)
NEUTROPHILS NFR BLD: 76 %
NUC CELL # FLD: 197 /CUMM
O2 THERAPY: ABNORMAL
PATH REV: YES
PCO2 BLDA: 29.5 MMHG (ref 35–45)
PERFORMED ON: ABNORMAL
PH BLDA: 7.53 [PH] (ref 7.35–7.45)
PLATELET # BLD AUTO: 89 K/UL (ref 135–450)
PMV BLD AUTO: 6.3 FL (ref 5–10.5)
PO2 BLDA: 86.2 MMHG (ref 75–108)
POTASSIUM SERPL-SCNC: 4.1 MMOL/L (ref 3.5–5.1)
PROCALCITONIN SERPL IA-MCNC: 0.14 NG/ML (ref 0–0.15)
PROT SERPL-MCNC: 5.6 G/DL (ref 6.4–8.2)
RBC # BLD AUTO: 2.21 M/UL (ref 4–5.2)
RBC FLUID: <1000 /CUMM
SAO2 % BLDA: 99.1 %
SODIUM SERPL-SCNC: 129 MMOL/L (ref 136–145)
TOTAL CELLS COUNTED FLD: 100
VIT B12 SERPL-MCNC: 1312 PG/ML (ref 211–911)
WBC # BLD AUTO: 9.5 K/UL (ref 4–11)

## 2024-01-27 PROCEDURE — 87040 BLOOD CULTURE FOR BACTERIA: CPT

## 2024-01-27 PROCEDURE — P9047 ALBUMIN (HUMAN), 25%, 50ML: HCPCS | Performed by: INTERNAL MEDICINE

## 2024-01-27 PROCEDURE — 85025 COMPLETE CBC W/AUTO DIFF WBC: CPT

## 2024-01-27 PROCEDURE — 89051 BODY FLUID CELL COUNT: CPT

## 2024-01-27 PROCEDURE — 84157 ASSAY OF PROTEIN OTHER: CPT

## 2024-01-27 PROCEDURE — 88305 TISSUE EXAM BY PATHOLOGIST: CPT

## 2024-01-27 PROCEDURE — 87070 CULTURE OTHR SPECIMN AEROBIC: CPT

## 2024-01-27 PROCEDURE — 0202U NFCT DS 22 TRGT SARS-COV-2: CPT

## 2024-01-27 PROCEDURE — C9113 INJ PANTOPRAZOLE SODIUM, VIA: HCPCS | Performed by: STUDENT IN AN ORGANIZED HEALTH CARE EDUCATION/TRAINING PROGRAM

## 2024-01-27 PROCEDURE — 88112 CYTOPATH CELL ENHANCE TECH: CPT

## 2024-01-27 PROCEDURE — 2060000000 HC ICU INTERMEDIATE R&B

## 2024-01-27 PROCEDURE — 82042 OTHER SOURCE ALBUMIN QUAN EA: CPT

## 2024-01-27 PROCEDURE — C1729 CATH, DRAINAGE: HCPCS

## 2024-01-27 PROCEDURE — 82803 BLOOD GASES ANY COMBINATION: CPT

## 2024-01-27 PROCEDURE — 87205 SMEAR GRAM STAIN: CPT

## 2024-01-27 PROCEDURE — 2500000003 HC RX 250 WO HCPCS: Performed by: STUDENT IN AN ORGANIZED HEALTH CARE EDUCATION/TRAINING PROGRAM

## 2024-01-27 PROCEDURE — 6370000000 HC RX 637 (ALT 250 FOR IP): Performed by: INTERNAL MEDICINE

## 2024-01-27 PROCEDURE — 2580000003 HC RX 258: Performed by: STUDENT IN AN ORGANIZED HEALTH CARE EDUCATION/TRAINING PROGRAM

## 2024-01-27 PROCEDURE — 82945 GLUCOSE OTHER FLUID: CPT

## 2024-01-27 PROCEDURE — 83935 ASSAY OF URINE OSMOLALITY: CPT

## 2024-01-27 PROCEDURE — 87015 SPECIMEN INFECT AGNT CONCNTJ: CPT

## 2024-01-27 PROCEDURE — 36600 WITHDRAWAL OF ARTERIAL BLOOD: CPT

## 2024-01-27 PROCEDURE — 36415 COLL VENOUS BLD VENIPUNCTURE: CPT

## 2024-01-27 PROCEDURE — 84145 PROCALCITONIN (PCT): CPT

## 2024-01-27 PROCEDURE — 82746 ASSAY OF FOLIC ACID SERUM: CPT

## 2024-01-27 PROCEDURE — 6360000002 HC RX W HCPCS: Performed by: STUDENT IN AN ORGANIZED HEALTH CARE EDUCATION/TRAINING PROGRAM

## 2024-01-27 PROCEDURE — 49083 ABD PARACENTESIS W/IMAGING: CPT

## 2024-01-27 PROCEDURE — 82150 ASSAY OF AMYLASE: CPT

## 2024-01-27 PROCEDURE — 87449 NOS EACH ORGANISM AG IA: CPT

## 2024-01-27 PROCEDURE — 80053 COMPREHEN METABOLIC PANEL: CPT

## 2024-01-27 PROCEDURE — 83615 LACTATE (LD) (LDH) ENZYME: CPT

## 2024-01-27 PROCEDURE — 6370000000 HC RX 637 (ALT 250 FOR IP): Performed by: STUDENT IN AN ORGANIZED HEALTH CARE EDUCATION/TRAINING PROGRAM

## 2024-01-27 PROCEDURE — 2580000003 HC RX 258: Performed by: INTERNAL MEDICINE

## 2024-01-27 PROCEDURE — 87086 URINE CULTURE/COLONY COUNT: CPT

## 2024-01-27 PROCEDURE — 6360000002 HC RX W HCPCS: Performed by: INTERNAL MEDICINE

## 2024-01-27 PROCEDURE — 83735 ASSAY OF MAGNESIUM: CPT

## 2024-01-27 PROCEDURE — 82607 VITAMIN B-12: CPT

## 2024-01-27 PROCEDURE — 71045 X-RAY EXAM CHEST 1 VIEW: CPT

## 2024-01-27 PROCEDURE — 84300 ASSAY OF URINE SODIUM: CPT

## 2024-01-27 PROCEDURE — 0W9G3ZZ DRAINAGE OF PERITONEAL CAVITY, PERCUTANEOUS APPROACH: ICD-10-PCS | Performed by: RADIOLOGY

## 2024-01-27 RX ORDER — POLYETHYLENE GLYCOL 3350 17 G/17G
17 POWDER, FOR SOLUTION ORAL DAILY PRN
Status: DISCONTINUED | OUTPATIENT
Start: 2024-01-27 | End: 2024-02-01 | Stop reason: HOSPADM

## 2024-01-27 RX ORDER — MIDODRINE HYDROCHLORIDE 5 MG/1
5 TABLET ORAL
Status: DISCONTINUED | OUTPATIENT
Start: 2024-01-27 | End: 2024-02-01 | Stop reason: HOSPADM

## 2024-01-27 RX ORDER — INSULIN LISPRO 100 [IU]/ML
0-4 INJECTION, SOLUTION INTRAVENOUS; SUBCUTANEOUS
Status: DISCONTINUED | OUTPATIENT
Start: 2024-01-27 | End: 2024-02-01 | Stop reason: HOSPADM

## 2024-01-27 RX ORDER — GLUCAGON 1 MG/ML
1 KIT INJECTION PRN
Status: DISCONTINUED | OUTPATIENT
Start: 2024-01-27 | End: 2024-02-01 | Stop reason: HOSPADM

## 2024-01-27 RX ORDER — EPINEPHRINE 1 MG/ML
0.15 INJECTION, SOLUTION INTRAMUSCULAR; SUBCUTANEOUS ONCE
Status: COMPLETED | OUTPATIENT
Start: 2024-01-27 | End: 2024-01-27

## 2024-01-27 RX ORDER — LACTULOSE 10 G/15ML
20 SOLUTION ORAL 3 TIMES DAILY
Status: DISCONTINUED | OUTPATIENT
Start: 2024-01-27 | End: 2024-02-01 | Stop reason: HOSPADM

## 2024-01-27 RX ORDER — INSULIN LISPRO 100 [IU]/ML
0-4 INJECTION, SOLUTION INTRAVENOUS; SUBCUTANEOUS NIGHTLY
Status: DISCONTINUED | OUTPATIENT
Start: 2024-01-27 | End: 2024-02-01 | Stop reason: HOSPADM

## 2024-01-27 RX ORDER — FOLIC ACID 1 MG/1
1 TABLET ORAL DAILY
Status: DISCONTINUED | OUTPATIENT
Start: 2024-01-28 | End: 2024-02-01 | Stop reason: HOSPADM

## 2024-01-27 RX ORDER — FERROUS SULFATE 325(65) MG
325 TABLET ORAL
Status: DISCONTINUED | OUTPATIENT
Start: 2024-01-28 | End: 2024-02-01 | Stop reason: HOSPADM

## 2024-01-27 RX ORDER — ALBUMIN (HUMAN) 12.5 G/50ML
25 SOLUTION INTRAVENOUS ONCE
Status: COMPLETED | OUTPATIENT
Start: 2024-01-27 | End: 2024-01-27

## 2024-01-27 RX ORDER — SPIRONOLACTONE 25 MG/1
100 TABLET ORAL DAILY
Status: DISCONTINUED | OUTPATIENT
Start: 2024-01-27 | End: 2024-01-28

## 2024-01-27 RX ORDER — DIPHENHYDRAMINE HYDROCHLORIDE 50 MG/ML
25 INJECTION INTRAMUSCULAR; INTRAVENOUS ONCE
Status: COMPLETED | OUTPATIENT
Start: 2024-01-27 | End: 2024-01-27

## 2024-01-27 RX ORDER — FUROSEMIDE 10 MG/ML
40 INJECTION INTRAMUSCULAR; INTRAVENOUS DAILY
Status: DISCONTINUED | OUTPATIENT
Start: 2024-01-27 | End: 2024-01-28

## 2024-01-27 RX ORDER — SODIUM CHLORIDE 0.9 % (FLUSH) 0.9 %
5-40 SYRINGE (ML) INJECTION PRN
Status: DISCONTINUED | OUTPATIENT
Start: 2024-01-27 | End: 2024-02-01 | Stop reason: HOSPADM

## 2024-01-27 RX ORDER — ONDANSETRON 2 MG/ML
4 INJECTION INTRAMUSCULAR; INTRAVENOUS EVERY 6 HOURS PRN
Status: DISCONTINUED | OUTPATIENT
Start: 2024-01-27 | End: 2024-02-01 | Stop reason: HOSPADM

## 2024-01-27 RX ORDER — SODIUM CHLORIDE 9 MG/ML
INJECTION, SOLUTION INTRAVENOUS PRN
Status: DISCONTINUED | OUTPATIENT
Start: 2024-01-27 | End: 2024-02-01 | Stop reason: HOSPADM

## 2024-01-27 RX ORDER — DEXTROSE MONOHYDRATE 100 MG/ML
INJECTION, SOLUTION INTRAVENOUS CONTINUOUS PRN
Status: DISCONTINUED | OUTPATIENT
Start: 2024-01-27 | End: 2024-02-01 | Stop reason: HOSPADM

## 2024-01-27 RX ORDER — ONDANSETRON 4 MG/1
4 TABLET, ORALLY DISINTEGRATING ORAL EVERY 8 HOURS PRN
Status: DISCONTINUED | OUTPATIENT
Start: 2024-01-27 | End: 2024-02-01 | Stop reason: HOSPADM

## 2024-01-27 RX ORDER — SODIUM CHLORIDE 0.9 % (FLUSH) 0.9 %
5-40 SYRINGE (ML) INJECTION EVERY 12 HOURS SCHEDULED
Status: DISCONTINUED | OUTPATIENT
Start: 2024-01-27 | End: 2024-02-01 | Stop reason: HOSPADM

## 2024-01-27 RX ORDER — CARVEDILOL 3.12 MG/1
3.12 TABLET ORAL 2 TIMES DAILY WITH MEALS
Status: DISCONTINUED | OUTPATIENT
Start: 2024-01-27 | End: 2024-01-30

## 2024-01-27 RX ORDER — ACETAMINOPHEN 650 MG/1
650 SUPPOSITORY RECTAL EVERY 6 HOURS PRN
Status: DISCONTINUED | OUTPATIENT
Start: 2024-01-27 | End: 2024-02-01 | Stop reason: HOSPADM

## 2024-01-27 RX ORDER — LIDOCAINE HYDROCHLORIDE 10 MG/ML
10 INJECTION, SOLUTION EPIDURAL; INFILTRATION; INTRACAUDAL; PERINEURAL ONCE
Status: COMPLETED | OUTPATIENT
Start: 2024-01-27 | End: 2024-01-27

## 2024-01-27 RX ORDER — ACETAMINOPHEN 325 MG/1
650 TABLET ORAL EVERY 6 HOURS PRN
Status: DISCONTINUED | OUTPATIENT
Start: 2024-01-27 | End: 2024-02-01 | Stop reason: HOSPADM

## 2024-01-27 RX ORDER — PANTOPRAZOLE SODIUM 40 MG/10ML
40 INJECTION, POWDER, LYOPHILIZED, FOR SOLUTION INTRAVENOUS DAILY
Status: DISCONTINUED | OUTPATIENT
Start: 2024-01-27 | End: 2024-01-28

## 2024-01-27 RX ADMIN — DIPHENHYDRAMINE HYDROCHLORIDE 25 MG: 50 INJECTION INTRAMUSCULAR; INTRAVENOUS at 12:22

## 2024-01-27 RX ADMIN — LACTULOSE: 10 SOLUTION ORAL; RECTAL at 11:29

## 2024-01-27 RX ADMIN — FUROSEMIDE 40 MG: 10 INJECTION, SOLUTION INTRAMUSCULAR; INTRAVENOUS at 11:44

## 2024-01-27 RX ADMIN — LIDOCAINE HYDROCHLORIDE 10 ML: 10 INJECTION, SOLUTION EPIDURAL; INFILTRATION; INTRACAUDAL; PERINEURAL at 14:33

## 2024-01-27 RX ADMIN — EPINEPHRINE 0.15 MG: 1 INJECTION INTRAMUSCULAR; INTRAVENOUS; SUBCUTANEOUS at 11:27

## 2024-01-27 RX ADMIN — Medication 10 ML: at 21:51

## 2024-01-27 RX ADMIN — LACTULOSE: 10 SOLUTION ORAL; RECTAL at 16:50

## 2024-01-27 RX ADMIN — Medication 5 ML: at 12:02

## 2024-01-27 RX ADMIN — PANTOPRAZOLE SODIUM 40 MG: 40 INJECTION, POWDER, FOR SOLUTION INTRAVENOUS at 11:44

## 2024-01-27 RX ADMIN — ALBUMIN (HUMAN) 25 G: 0.25 INJECTION, SOLUTION INTRAVENOUS at 13:12

## 2024-01-27 NOTE — CONSULTS
[facility]  GI Consult Note    Patient: Angélica Gonzalez  : 1985  Acct#:      Date:  2024    Subjective:       History of Present Illness  Patient is a 38 y.o.  female whom we are consulted for hepatic encephalopathy.  She is known to our group with cirrhosis due to alcohol use complicated by intractable ascites requiring regular paracenteses every 2 weeks and hyponatremia requiring followed by nephrology.  She has been abstinent from alcohol for 3 months (2023 was her last drink) and she is undergoing alcohol rehabilitation as an outpatient. She was recently dc from [facility] after admit for hyponatremia managed by nephrology, mild hepatic enceph resolved with xifaxan and po lactulose and dc home. Then had ov this week with Dr Marin Alexander at  transplant who increased midodrine to 10 mg tid for hypotension, dc'd carvedilol, rec cont xifaxan (no enceph at ov, no mention lactulose in note) and set f/u 2023 at which time off etoh and completed rehab for 12 weeks to then be considered for transplant listing.   Pt apparently was taken to [facility] this am with confusion/disorientation but was awake/talking at that time; I called ER and was told she become somewhat combative/ was trying climb out of bed so was given Haldol at that time and has been somnolent since. Pt currently unable give any verbal history.        Past Medical History:   Diagnosis Date    ADHD     Cirrhosis (HCC)     Dyslexia     ETOH abuse     Hepatic cirrhosis (HCC)     History of blood transfusion       Past Surgical History:   Procedure Laterality Date    PARACENTESIS  10/14/2023    had several: most recent Friday the     UPPER GASTROINTESTINAL ENDOSCOPY N/A 11/15/2023    EGD BIOPSY performed by Jone Burns MD at [facility] ENDOSCOPY        Admission Meds  No current facility-administered medications on file prior to encounter.     Current Outpatient Medications on File Prior to Encounter

## 2024-01-27 NOTE — H&P
completed rehab for 12 weeks to then be considered for transplant listing.\"  -GI consulted, appreciate recs    Ascites  Patient gets by with weekly paracentesis.  On presentation, she does have a lot of ascites.  Interventional radiology took her to the endoscopy suite and drained 1.8 L of ascitic fluid    Hyponatremia  Likely secondary to suspected hypervolemic hyponatremia  Patient has a lot of ascites.  Her sodium at the outside facility was 125.  Patient also does have ascites.  -Nephrology consulted, appreciate recs    Code Status: Full Code   FEN: Diet NPO   DVT PPX: [] Lovenox, []Heparin, [x] SCDs, [] Eliquis, [] Xarelto, [] Coumadin  DISPO: [x] GMF, [] ICU, [] CVU    Mario Claudio MD  1/27/2024,  5:25 PM    This note was likely completed using voice recognition technology and may contain unintended errors.     Physical Exam, PMH/PSH and ROS     Physical Exam  General appearance: jaundiced/ somnolent/unresponsive  jaundiced  Head: Normocephalic, without obvious abnormality  Lungs: clear to auscultation bilaterally  Heart: regular rate and rhythm  Abdomen: soft, distended, nontender   Extremities: no edema  Skin: warm and dry, jaundiced  Neuro: somnolent, does not respond to verbal or tactile stimuli.      Vitals:    01/27/24 1710   BP: 121/73   Pulse: (!) 108   Resp: 17   Temp: 97.7 °F (36.5 °C)   SpO2: 100%       Review of Systems  ROS: Unable to assess    Past Medical History:        Diagnosis Date    ADHD     Cirrhosis (HCC)     Dyslexia     ETOH abuse     Hepatic cirrhosis (HCC)     History of blood transfusion        Past Surgical History:        Procedure Laterality Date    PARACENTESIS  10/14/2023    had several: most recent Friday the 12th Jan    UPPER GASTROINTESTINAL ENDOSCOPY N/A 11/15/2023    EGD BIOPSY performed by Jone Burns MD at [facility] ENDOSCOPY       Medications Priorto Admission:    Medications Prior to Admission: rifAXIMin (XIFAXAN) 550 MG tablet, Take 1 tablet by mouth 2 times

## 2024-01-27 NOTE — BRIEF OP NOTE
Brief Postoperative Note    Angélica Gonzalez  YOB: 1985  [id number]    Pre-operative Diagnosis: ascites    Post-operative Diagnosis: Same    Procedure: US-guided paracentesis    Anesthesia: Local    Surgeons: Kermit Espino MD    Estimated Blood Loss: Less than 5 mL    Complications: None    Specimens: ascites drained    Findings: Successful US-guided paracentesis.    Electronically signed by Kermit Espino MD on 1/27/2024 at 4:07 PM

## 2024-01-28 LAB
ACANTHOCYTES BLD QL SMEAR: ABNORMAL
ALBUMIN FLD-MCNC: 0.8 G/DL
ALBUMIN SERPL-MCNC: 3.6 G/DL (ref 3.4–5)
ALBUMIN/GLOB SERPL: 2.4 {RATIO} (ref 1.1–2.2)
ALP SERPL-CCNC: 145 U/L (ref 40–129)
ALT SERPL-CCNC: 22 U/L (ref 10–40)
AMMONIA PLAS-SCNC: 86 UMOL/L (ref 11–51)
AMYLASE FLD-CCNC: 11 U/L
ANION GAP SERPL CALCULATED.3IONS-SCNC: 14 MMOL/L (ref 3–16)
AST SERPL-CCNC: 65 U/L (ref 15–37)
BACTERIA UR CULT: NORMAL
BACTERIA URNS QL MICRO: ABNORMAL /HPF
BASOPHILS # BLD: 0 K/UL (ref 0–0.2)
BASOPHILS NFR BLD: 0.3 %
BILIRUB DIRECT SERPL-MCNC: 6 MG/DL (ref 0–0.3)
BILIRUB INDIRECT SERPL-MCNC: 12.2 MG/DL (ref 0–1)
BILIRUB SERPL-MCNC: 17.9 MG/DL (ref 0–1)
BILIRUB SERPL-MCNC: 18.2 MG/DL (ref 0–1)
BILIRUB UR QL STRIP.AUTO: ABNORMAL
BUN SERPL-MCNC: 25 MG/DL (ref 7–20)
BURR CELLS BLD QL SMEAR: ABNORMAL
CALCIUM SERPL-MCNC: 9.1 MG/DL (ref 8.3–10.6)
CHLORIDE SERPL-SCNC: 93 MMOL/L (ref 99–110)
CLARITY UR: CLEAR
CO2 SERPL-SCNC: 21 MMOL/L (ref 21–32)
COLOR UR: ABNORMAL
CREAT SERPL-MCNC: <0.5 MG/DL (ref 0.6–1.1)
DEPRECATED RDW RBC AUTO: 26.2 % (ref 12.4–15.4)
EOSINOPHIL # BLD: 0.2 K/UL (ref 0–0.6)
EOSINOPHIL NFR BLD: 1.5 %
EPI CELLS #/AREA URNS AUTO: 18 /HPF (ref 0–5)
GFR SERPLBLD CREATININE-BSD FMLA CKD-EPI: >60 ML/MIN/{1.73_M2}
GLUCOSE BLD-MCNC: 120 MG/DL (ref 70–99)
GLUCOSE BLD-MCNC: 133 MG/DL (ref 70–99)
GLUCOSE BLD-MCNC: 152 MG/DL (ref 70–99)
GLUCOSE BLD-MCNC: 166 MG/DL (ref 70–99)
GLUCOSE FLD-MCNC: 128 MG/DL
GLUCOSE SERPL-MCNC: 114 MG/DL (ref 70–99)
GLUCOSE UR STRIP.AUTO-MCNC: NEGATIVE MG/DL
HCT VFR BLD AUTO: 20.6 % (ref 36–48)
HCT VFR BLD AUTO: 20.7 % (ref 36–48)
HCT VFR BLD AUTO: 20.9 % (ref 36–48)
HEMOCCULT STL QL: ABNORMAL
HGB BLD-MCNC: 7.4 G/DL (ref 12–16)
HGB BLD-MCNC: 7.4 G/DL (ref 12–16)
HGB UR QL STRIP.AUTO: ABNORMAL
HYALINE CASTS #/AREA URNS AUTO: 3 /LPF (ref 0–8)
IMMATURE RETIC FRACT: 0.49 (ref 0.21–0.37)
INR PPP: 2.99 (ref 0.84–1.16)
KETONES UR STRIP.AUTO-MCNC: NEGATIVE MG/DL
LDH FLD L TO P-CCNC: 56 U/L
LDH SERPL L TO P-CCNC: 316 U/L (ref 100–190)
LEGIONELLA AG UR QL: NORMAL
LEUKOCYTE ESTERASE UR QL STRIP.AUTO: NEGATIVE
LYMPHOCYTES # BLD: 1.4 K/UL (ref 1–5.1)
LYMPHOCYTES NFR BLD: 11.7 %
MACROCYTES BLD QL SMEAR: ABNORMAL
MAGNESIUM SERPL-MCNC: 2.1 MG/DL (ref 1.8–2.4)
MCH RBC QN AUTO: 35.9 PG (ref 26–34)
MCHC RBC AUTO-ENTMCNC: 35.7 G/DL (ref 31–36)
MCV RBC AUTO: 100.7 FL (ref 80–100)
MONOCYTES # BLD: 1.4 K/UL (ref 0–1.3)
MONOCYTES NFR BLD: 11.8 %
NEUTROPHILS # BLD: 8.7 K/UL (ref 1.7–7.7)
NEUTROPHILS NFR BLD: 74.7 %
NITRITE UR QL STRIP.AUTO: NEGATIVE
OSMOLALITY UR: 491 MOSM/KG (ref 390–1070)
PERFORMED ON: ABNORMAL
PH UR STRIP.AUTO: 5.5 [PH] (ref 5–8)
PLATELET # BLD AUTO: 89 K/UL (ref 135–450)
PLATELET BLD QL SMEAR: ABNORMAL
PMV BLD AUTO: 6.2 FL (ref 5–10.5)
POTASSIUM SERPL-SCNC: 4.2 MMOL/L (ref 3.5–5.1)
PROT FLD-MCNC: 0.9 G/DL
PROT SERPL-MCNC: 5.1 G/DL (ref 6.4–8.2)
PROT UR STRIP.AUTO-MCNC: NEGATIVE MG/DL
PROTHROMBIN TIME: 30.9 SEC (ref 11.5–14.8)
RBC # BLD AUTO: 2.05 M/UL (ref 4–5.2)
RBC CLUMPS #/AREA URNS AUTO: 3 /HPF (ref 0–4)
REPORT: NORMAL
RESP PATH DNA+RNA PNL NPH NAA+NON-PROBE: NORMAL
RETICS # AUTO: 0.1 M/UL (ref 0.02–0.1)
RETICS/RBC NFR AUTO: 4.96 % (ref 0.5–2.18)
S PNEUM AG UR QL: NORMAL
SCHISTOCYTES BLD QL SMEAR: ABNORMAL
SLIDE REVIEW: ABNORMAL
SODIUM SERPL-SCNC: 128 MMOL/L (ref 136–145)
SODIUM UR-SCNC: <20 MMOL/L
SP GR UR STRIP.AUTO: 1.02 (ref 1–1.03)
SPECIMEN SOURCE FLD: NORMAL
SPECIMEN SOURCE FLD: NORMAL
TARGETS BLD QL SMEAR: ABNORMAL
UA COMPLETE W REFLEX CULTURE PNL UR: ABNORMAL
UA DIPSTICK W REFLEX MICRO PNL UR: YES
URN SPEC COLLECT METH UR: ABNORMAL
UROBILINOGEN UR STRIP-ACNC: 4 E.U./DL
WBC # BLD AUTO: 11.7 K/UL (ref 4–11)
WBC #/AREA URNS AUTO: 9 /HPF (ref 0–5)

## 2024-01-28 PROCEDURE — 82248 BILIRUBIN DIRECT: CPT

## 2024-01-28 PROCEDURE — 6370000000 HC RX 637 (ALT 250 FOR IP): Performed by: STUDENT IN AN ORGANIZED HEALTH CARE EDUCATION/TRAINING PROGRAM

## 2024-01-28 PROCEDURE — 80053 COMPREHEN METABOLIC PANEL: CPT

## 2024-01-28 PROCEDURE — 6360000002 HC RX W HCPCS: Performed by: STUDENT IN AN ORGANIZED HEALTH CARE EDUCATION/TRAINING PROGRAM

## 2024-01-28 PROCEDURE — 85025 COMPLETE CBC W/AUTO DIFF WBC: CPT

## 2024-01-28 PROCEDURE — 83735 ASSAY OF MAGNESIUM: CPT

## 2024-01-28 PROCEDURE — 83615 LACTATE (LD) (LDH) ENZYME: CPT

## 2024-01-28 PROCEDURE — 82247 BILIRUBIN TOTAL: CPT

## 2024-01-28 PROCEDURE — 82140 ASSAY OF AMMONIA: CPT

## 2024-01-28 PROCEDURE — 85045 AUTOMATED RETICULOCYTE COUNT: CPT

## 2024-01-28 PROCEDURE — 82270 OCCULT BLOOD FECES: CPT

## 2024-01-28 PROCEDURE — C9113 INJ PANTOPRAZOLE SODIUM, VIA: HCPCS | Performed by: STUDENT IN AN ORGANIZED HEALTH CARE EDUCATION/TRAINING PROGRAM

## 2024-01-28 PROCEDURE — 85014 HEMATOCRIT: CPT

## 2024-01-28 PROCEDURE — 81001 URINALYSIS AUTO W/SCOPE: CPT

## 2024-01-28 PROCEDURE — 85610 PROTHROMBIN TIME: CPT

## 2024-01-28 PROCEDURE — 2580000003 HC RX 258: Performed by: STUDENT IN AN ORGANIZED HEALTH CARE EDUCATION/TRAINING PROGRAM

## 2024-01-28 PROCEDURE — 85018 HEMOGLOBIN: CPT

## 2024-01-28 PROCEDURE — 36415 COLL VENOUS BLD VENIPUNCTURE: CPT

## 2024-01-28 PROCEDURE — 2060000000 HC ICU INTERMEDIATE R&B

## 2024-01-28 RX ORDER — TRAMADOL HYDROCHLORIDE 50 MG/1
50 TABLET ORAL EVERY 6 HOURS PRN
Status: DISCONTINUED | OUTPATIENT
Start: 2024-01-28 | End: 2024-02-01 | Stop reason: HOSPADM

## 2024-01-28 RX ORDER — PANTOPRAZOLE SODIUM 40 MG/10ML
40 INJECTION, POWDER, LYOPHILIZED, FOR SOLUTION INTRAVENOUS 2 TIMES DAILY
Status: DISCONTINUED | OUTPATIENT
Start: 2024-01-28 | End: 2024-02-01 | Stop reason: HOSPADM

## 2024-01-28 RX ORDER — FUROSEMIDE 20 MG/1
20 TABLET ORAL 2 TIMES DAILY
Status: DISCONTINUED | OUTPATIENT
Start: 2024-01-28 | End: 2024-02-01 | Stop reason: HOSPADM

## 2024-01-28 RX ADMIN — RIFAXIMIN 400 MG: 200 TABLET ORAL at 20:53

## 2024-01-28 RX ADMIN — SPIRONOLACTONE 100 MG: 25 TABLET ORAL at 09:38

## 2024-01-28 RX ADMIN — TRAMADOL HYDROCHLORIDE 50 MG: 50 TABLET ORAL at 22:17

## 2024-01-28 RX ADMIN — MIDODRINE HYDROCHLORIDE 5 MG: 5 TABLET ORAL at 12:08

## 2024-01-28 RX ADMIN — LACTULOSE 20 G: 20 SOLUTION ORAL at 01:02

## 2024-01-28 RX ADMIN — FERROUS SULFATE TAB 325 MG (65 MG ELEMENTAL FE) 325 MG: 325 (65 FE) TAB at 09:38

## 2024-01-28 RX ADMIN — LACTULOSE 20 G: 20 SOLUTION ORAL at 09:38

## 2024-01-28 RX ADMIN — Medication 10 ML: at 20:53

## 2024-01-28 RX ADMIN — Medication 10 ML: at 09:39

## 2024-01-28 RX ADMIN — PANTOPRAZOLE SODIUM 40 MG: 40 INJECTION, POWDER, FOR SOLUTION INTRAVENOUS at 09:38

## 2024-01-28 RX ADMIN — MIDODRINE HYDROCHLORIDE 5 MG: 5 TABLET ORAL at 17:22

## 2024-01-28 RX ADMIN — LACTULOSE 20 G: 20 SOLUTION ORAL at 20:53

## 2024-01-28 RX ADMIN — FOLIC ACID 1 MG: 1 TABLET ORAL at 09:38

## 2024-01-28 RX ADMIN — FUROSEMIDE 20 MG: 20 TABLET ORAL at 17:22

## 2024-01-28 RX ADMIN — RIFAXIMIN 400 MG: 200 TABLET ORAL at 09:38

## 2024-01-28 RX ADMIN — MIDODRINE HYDROCHLORIDE 5 MG: 5 TABLET ORAL at 09:38

## 2024-01-28 RX ADMIN — PANTOPRAZOLE SODIUM 40 MG: 40 INJECTION, POWDER, FOR SOLUTION INTRAVENOUS at 20:53

## 2024-01-28 RX ADMIN — RIFAXIMIN 400 MG: 200 TABLET ORAL at 14:51

## 2024-01-28 ASSESSMENT — PAIN SCALES - GENERAL
PAINLEVEL_OUTOF10: 0
PAINLEVEL_OUTOF10: 6

## 2024-01-28 ASSESSMENT — PAIN DESCRIPTION - DESCRIPTORS: DESCRIPTORS: ACHING

## 2024-01-28 ASSESSMENT — PAIN DESCRIPTION - LOCATION: LOCATION: ABDOMEN;HEAD

## 2024-01-28 ASSESSMENT — PAIN SCALES - WONG BAKER: WONGBAKER_NUMERICALRESPONSE: 0

## 2024-01-28 NOTE — CONSULTS
[facility]  Consultation Note    Reason for Consult:  Chronic Hyponatremia  Requesting Physician:  Dr LAURI Claudio    CHIEF COMPLAINT:  Mental status change    History obtained from records and patient.    HISTORY OF PRESENT ILLNESS:               Angélica Gonzalez  is 38 y.o., female with significant past medical history of Alcoholic Liver Cirrhosis, Hyponatremia, best Na around 128-129, ADHD,  who presents due mental status change.  Was recently admitted due to hyponatremia and was discharged on urea 15 g twice daily and Lasix 20 mg p.o. twice daily.  Na currently 128.  Lowest sbp in the 90's range.   No H/O Seizures.  No regular NSAID use.  Has nausea.  Last alcohol drink around September 2023.  Off urea, her serum sodium decreases.  Ammonia level today at 86 which is about the same as January 22, 2024 which was 85.    Past Medical History:     has a past medical history of ADHD, Cirrhosis (HCC), Dyslexia, ETOH abuse, Hepatic cirrhosis (HCC), and History of blood transfusion.   Past Surgical History:     has a past surgical history that includes Paracentesis (10/14/2023) and Upper gastrointestinal endoscopy (N/A, 11/15/2023).   Current Medications:    Current Facility-Administered Medications: carvedilol (COREG) tablet 3.125 mg, 3.125 mg, Oral, BID WC  ferrous sulfate (IRON 325) tablet 325 mg, 325 mg, Oral, Daily with breakfast  folic acid (FOLVITE) tablet 1 mg, 1 mg, Oral, Daily  midodrine (PROAMATINE) tablet 5 mg, 5 mg, Oral, TID WC  rifAXIMin (XIFAXAN) tablet 400 mg, 400 mg, Oral, TID  sodium chloride flush 0.9 % injection 5-40 mL, 5-40 mL, IntraVENous, 2 times per day  sodium chloride flush 0.9 % injection 5-40 mL, 5-40 mL, IntraVENous, PRN  0.9 % sodium chloride infusion, , IntraVENous, PRN  ondansetron (ZOFRAN-ODT) disintegrating tablet 4 mg, 4 mg, Oral, Q8H PRN **OR** ondansetron (ZOFRAN) injection 4 mg, 4 mg, IntraVENous, Q6H PRN  acetaminophen (TYLENOL) tablet 650 mg, 650

## 2024-01-28 NOTE — PLAN OF CARE
Problem: Discharge Planning  Goal: Discharge to home or other facility with appropriate resources  1/28/2024 1130 by Maria Alejandra Brown  Outcome: Progressing  1/28/2024 0139 by Merline Slaughter RN  Outcome: Progressing     Problem: Skin/Tissue Integrity  Goal: Absence of new skin breakdown  Description: 1.  Monitor for areas of redness and/or skin breakdown  2.  Assess vascular access sites hourly  3.  Every 4-6 hours minimum:  Change oxygen saturation probe site  4.  Every 4-6 hours:  If on nasal continuous positive airway pressure, respiratory therapy assess nares and determine need for appliance change or resting period.  1/28/2024 1130 by Maria Alejandra Brown  Outcome: Progressing  1/28/2024 0139 by Merline Slaughter RN  Outcome: Progressing     Problem: ABCDS Injury Assessment  Goal: Absence of physical injury  1/28/2024 1130 by Maria Alejandra Brown  Outcome: Progressing  1/28/2024 0139 by Merline Slaughter, RN  Outcome: Progressing     Problem: Safety - Adult  Goal: Free from fall injury  1/28/2024 1130 by Maria Alejandra Brown  Outcome: Progressing  1/28/2024 0139 by Merline Slaughter, RN  Outcome: Progressing

## 2024-01-28 NOTE — PLAN OF CARE
Problem: Discharge Planning  Goal: Discharge to home or other facility with appropriate resources  Outcome: Progressing     Problem: Skin/Tissue Integrity  Goal: Absence of new skin breakdown  Description: 1.  Monitor for areas of redness and/or skin breakdown  2.  Assess vascular access sites hourly  3.  Every 4-6 hours minimum:  Change oxygen saturation probe site  4.  Every 4-6 hours:  If on nasal continuous positive airway pressure, respiratory therapy assess nares and determine need for appliance change or resting period.  Outcome: Progressing     Problem: ABCDS Injury Assessment  Goal: Absence of physical injury  Outcome: Progressing, pt remains free of falls     Problem: Safety - Adult  Goal: Free from fall injury  Outcome: Progressing

## 2024-01-29 LAB
ABO + RH BLD: NORMAL
ACANTHOCYTES BLD QL SMEAR: ABNORMAL
ALBUMIN SERPL-MCNC: 3.2 G/DL (ref 3.4–5)
ALBUMIN/GLOB SERPL: 1.9 {RATIO} (ref 1.1–2.2)
ALP SERPL-CCNC: 147 U/L (ref 40–129)
ALT SERPL-CCNC: 21 U/L (ref 10–40)
ANION GAP SERPL CALCULATED.3IONS-SCNC: 10 MMOL/L (ref 3–16)
ANISOCYTOSIS BLD QL SMEAR: ABNORMAL
AST SERPL-CCNC: 53 U/L (ref 15–37)
BASOPHILS # BLD: 0 K/UL (ref 0–0.2)
BASOPHILS NFR BLD: 0 %
BILIRUB SERPL-MCNC: 14.9 MG/DL (ref 0–1)
BLD GP AB SCN SERPL QL: NORMAL
BLOOD BANK DISPENSE STATUS: NORMAL
BLOOD BANK PRODUCT CODE: NORMAL
BPU ID: NORMAL
BUN SERPL-MCNC: 13 MG/DL (ref 7–20)
CALCIUM SERPL-MCNC: 7.9 MG/DL (ref 8.3–10.6)
CHLORIDE SERPL-SCNC: 89 MMOL/L (ref 99–110)
CO2 SERPL-SCNC: 21 MMOL/L (ref 21–32)
CREAT SERPL-MCNC: <0.5 MG/DL (ref 0.6–1.1)
DAT POLY-SP REAG RBC QL: NORMAL
DEPRECATED RDW RBC AUTO: 25.9 % (ref 12.4–15.4)
DESCRIPTION BLOOD BANK: NORMAL
EOSINOPHIL # BLD: 0.5 K/UL (ref 0–0.6)
EOSINOPHIL NFR BLD: 4 %
GFR SERPLBLD CREATININE-BSD FMLA CKD-EPI: >60 ML/MIN/{1.73_M2}
GLUCOSE BLD-MCNC: 112 MG/DL (ref 70–99)
GLUCOSE BLD-MCNC: 127 MG/DL (ref 70–99)
GLUCOSE BLD-MCNC: 192 MG/DL (ref 70–99)
GLUCOSE BLD-MCNC: 210 MG/DL (ref 70–99)
GLUCOSE SERPL-MCNC: 175 MG/DL (ref 70–99)
HAPTOGLOB SERPL-MCNC: <10 MG/DL (ref 30–200)
HCT VFR BLD AUTO: 19.5 % (ref 36–48)
HCT VFR BLD AUTO: 22.3 % (ref 36–48)
HCT VFR BLD AUTO: 23.8 % (ref 36–48)
HGB BLD-MCNC: 7 G/DL (ref 12–16)
HGB BLD-MCNC: 7.9 G/DL (ref 12–16)
HGB BLD-MCNC: 8.1 G/DL (ref 12–16)
LYMPHOCYTES # BLD: 0.7 K/UL (ref 1–5.1)
LYMPHOCYTES NFR BLD: 6 %
MACROCYTES BLD QL SMEAR: ABNORMAL
MAGNESIUM SERPL-MCNC: 1.7 MG/DL (ref 1.8–2.4)
MCH RBC QN AUTO: 36.3 PG (ref 26–34)
MCHC RBC AUTO-ENTMCNC: 35.7 G/DL (ref 31–36)
MCV RBC AUTO: 101.7 FL (ref 80–100)
MONOCYTES # BLD: 0.9 K/UL (ref 0–1.3)
MONOCYTES NFR BLD: 7 %
NEUTROPHILS # BLD: 10.2 K/UL (ref 1.7–7.7)
NEUTROPHILS NFR BLD: 82 %
NEUTS BAND NFR BLD MANUAL: 1 % (ref 0–7)
PATH CONSULT FLUID: NORMAL
PATH INTERP BLD-IMP: NORMAL
PERFORMED ON: ABNORMAL
PLATELET # BLD AUTO: 86 K/UL (ref 135–450)
PMV BLD AUTO: 6.8 FL (ref 5–10.5)
POLYCHROMASIA BLD QL SMEAR: ABNORMAL
POTASSIUM SERPL-SCNC: 3.8 MMOL/L (ref 3.5–5.1)
PROT SERPL-MCNC: 4.9 G/DL (ref 6.4–8.2)
RBC # BLD AUTO: 1.92 M/UL (ref 4–5.2)
SCHISTOCYTES BLD QL SMEAR: ABNORMAL
SODIUM SERPL-SCNC: 120 MMOL/L (ref 136–145)
WBC # BLD AUTO: 12.3 K/UL (ref 4–11)

## 2024-01-29 PROCEDURE — 6370000000 HC RX 637 (ALT 250 FOR IP): Performed by: STUDENT IN AN ORGANIZED HEALTH CARE EDUCATION/TRAINING PROGRAM

## 2024-01-29 PROCEDURE — 86038 ANTINUCLEAR ANTIBODIES: CPT

## 2024-01-29 PROCEDURE — 80053 COMPREHEN METABOLIC PANEL: CPT

## 2024-01-29 PROCEDURE — 86880 COOMBS TEST DIRECT: CPT

## 2024-01-29 PROCEDURE — 86901 BLOOD TYPING SEROLOGIC RH(D): CPT

## 2024-01-29 PROCEDURE — 86923 COMPATIBILITY TEST ELECTRIC: CPT

## 2024-01-29 PROCEDURE — 85018 HEMOGLOBIN: CPT

## 2024-01-29 PROCEDURE — C9113 INJ PANTOPRAZOLE SODIUM, VIA: HCPCS | Performed by: STUDENT IN AN ORGANIZED HEALTH CARE EDUCATION/TRAINING PROGRAM

## 2024-01-29 PROCEDURE — 6360000002 HC RX W HCPCS: Performed by: STUDENT IN AN ORGANIZED HEALTH CARE EDUCATION/TRAINING PROGRAM

## 2024-01-29 PROCEDURE — 36415 COLL VENOUS BLD VENIPUNCTURE: CPT

## 2024-01-29 PROCEDURE — 83735 ASSAY OF MAGNESIUM: CPT

## 2024-01-29 PROCEDURE — P9047 ALBUMIN (HUMAN), 25%, 50ML: HCPCS | Performed by: INTERNAL MEDICINE

## 2024-01-29 PROCEDURE — 6370000000 HC RX 637 (ALT 250 FOR IP): Performed by: INTERNAL MEDICINE

## 2024-01-29 PROCEDURE — 2580000003 HC RX 258: Performed by: STUDENT IN AN ORGANIZED HEALTH CARE EDUCATION/TRAINING PROGRAM

## 2024-01-29 PROCEDURE — 2060000000 HC ICU INTERMEDIATE R&B

## 2024-01-29 PROCEDURE — 36430 TRANSFUSION BLD/BLD COMPNT: CPT

## 2024-01-29 PROCEDURE — 86850 RBC ANTIBODY SCREEN: CPT

## 2024-01-29 PROCEDURE — 85025 COMPLETE CBC W/AUTO DIFF WBC: CPT

## 2024-01-29 PROCEDURE — 86900 BLOOD TYPING SEROLOGIC ABO: CPT

## 2024-01-29 PROCEDURE — 83010 ASSAY OF HAPTOGLOBIN QUANT: CPT

## 2024-01-29 PROCEDURE — P9016 RBC LEUKOCYTES REDUCED: HCPCS

## 2024-01-29 PROCEDURE — 6360000002 HC RX W HCPCS: Performed by: INTERNAL MEDICINE

## 2024-01-29 PROCEDURE — 85014 HEMATOCRIT: CPT

## 2024-01-29 RX ORDER — SODIUM CHLORIDE 9 MG/ML
INJECTION, SOLUTION INTRAVENOUS PRN
Status: DISCONTINUED | OUTPATIENT
Start: 2024-01-29 | End: 2024-02-01 | Stop reason: HOSPADM

## 2024-01-29 RX ORDER — MAGNESIUM SULFATE IN WATER 40 MG/ML
2000 INJECTION, SOLUTION INTRAVENOUS ONCE
Status: COMPLETED | OUTPATIENT
Start: 2024-01-29 | End: 2024-01-29

## 2024-01-29 RX ORDER — ALBUMIN (HUMAN) 12.5 G/50ML
25 SOLUTION INTRAVENOUS EVERY 8 HOURS
Status: COMPLETED | OUTPATIENT
Start: 2024-01-29 | End: 2024-01-31

## 2024-01-29 RX ADMIN — RIFAXIMIN 400 MG: 200 TABLET ORAL at 14:22

## 2024-01-29 RX ADMIN — LACTULOSE 20 G: 20 SOLUTION ORAL at 12:51

## 2024-01-29 RX ADMIN — FUROSEMIDE 20 MG: 20 TABLET ORAL at 11:12

## 2024-01-29 RX ADMIN — MIDODRINE HYDROCHLORIDE 5 MG: 5 TABLET ORAL at 17:03

## 2024-01-29 RX ADMIN — ALBUMIN (HUMAN) 12.5 G: 0.25 INJECTION, SOLUTION INTRAVENOUS at 21:30

## 2024-01-29 RX ADMIN — FOLIC ACID 1 MG: 1 TABLET ORAL at 11:13

## 2024-01-29 RX ADMIN — Medication 10 ML: at 20:18

## 2024-01-29 RX ADMIN — PANTOPRAZOLE SODIUM 40 MG: 40 INJECTION, POWDER, FOR SOLUTION INTRAVENOUS at 12:50

## 2024-01-29 RX ADMIN — MIDODRINE HYDROCHLORIDE 5 MG: 5 TABLET ORAL at 11:13

## 2024-01-29 RX ADMIN — MAGNESIUM SULFATE HEPTAHYDRATE 2000 MG: 40 INJECTION, SOLUTION INTRAVENOUS at 12:56

## 2024-01-29 RX ADMIN — FUROSEMIDE 20 MG: 20 TABLET ORAL at 17:03

## 2024-01-29 RX ADMIN — RIFAXIMIN 400 MG: 200 TABLET ORAL at 20:18

## 2024-01-29 RX ADMIN — Medication 15 G: at 12:51

## 2024-01-29 RX ADMIN — INSULIN LISPRO 1 UNITS: 100 INJECTION, SOLUTION INTRAVENOUS; SUBCUTANEOUS at 12:50

## 2024-01-29 RX ADMIN — ALBUMIN (HUMAN) 25 G: 0.25 INJECTION, SOLUTION INTRAVENOUS at 15:28

## 2024-01-29 RX ADMIN — FERROUS SULFATE TAB 325 MG (65 MG ELEMENTAL FE) 325 MG: 325 (65 FE) TAB at 11:13

## 2024-01-29 RX ADMIN — Medication 5 ML: at 10:25

## 2024-01-29 RX ADMIN — PANTOPRAZOLE SODIUM 40 MG: 40 INJECTION, POWDER, FOR SOLUTION INTRAVENOUS at 20:20

## 2024-01-29 NOTE — PLAN OF CARE
Problem: Discharge Planning  Goal: Discharge to home or other facility with appropriate resources  Outcome: Progressing     Problem: Skin/Tissue Integrity  Goal: Absence of new skin breakdown  Description: 1.  Monitor for areas of redness and/or skin breakdown  2.  Assess vascular access sites hourly  3.  Every 4-6 hours minimum:  Change oxygen saturation probe site  4.  Every 4-6 hours:  If on nasal continuous positive airway pressure, respiratory therapy assess nares and determine need for appliance change or resting period.  Outcome: Progressing     Problem: ABCDS Injury Assessment  Goal: Absence of physical injury  Outcome: Progressing     Problem: Safety - Adult  Goal: Free from fall injury  Outcome: Progressing, pt remains free of falls

## 2024-01-29 NOTE — CONSENT
Informed Consent for Blood Component Transfusion Note    I have discussed with the patient, patient Trinh restrepo (POA) the rationale for blood component transfusion; its benefits in treating or preventing fatigue, organ damage, or death; and its risk which includes mild transfusion reactions, rare risk of blood borne infection, or more serious but rare reactions. I have discussed the alternatives to transfusion, including the risk and consequences of not receiving transfusion. The patient, patient Trinh restrepo (POA) had an opportunity to ask questions and had agreed to proceed with transfusion of blood components.    Electronically signed by Mario Claudio MD on 1/29/24 at 8:39 AM EST

## 2024-01-29 NOTE — PLAN OF CARE
Problem: Discharge Planning  Goal: Discharge to home or other facility with appropriate resources  1/29/2024 0942 by Maria Alejandra Brown  Outcome: Progressing  1/29/2024 0151 by Merline Slaughter RN  Outcome: Progressing     Problem: Skin/Tissue Integrity  Goal: Absence of new skin breakdown  Description: 1.  Monitor for areas of redness and/or skin breakdown  2.  Assess vascular access sites hourly  3.  Every 4-6 hours minimum:  Change oxygen saturation probe site  4.  Every 4-6 hours:  If on nasal continuous positive airway pressure, respiratory therapy assess nares and determine need for appliance change or resting period.  1/29/2024 0942 by Maria Alejandra Brown  Outcome: Progressing  1/29/2024 0151 by Merline Slaughter RN  Outcome: Progressing     Problem: ABCDS Injury Assessment  Goal: Absence of physical injury  1/29/2024 0942 by Maria Alejandra Brown  Outcome: Progressing  1/29/2024 0151 by Merline Slaughter, RN  Outcome: Progressing     Problem: Safety - Adult  Goal: Free from fall injury  1/29/2024 0942 by Maria Alejandra Brown  Outcome: Progressing  1/29/2024 0151 by Merline Slaughter, RN  Outcome: Progressing

## 2024-01-30 ENCOUNTER — APPOINTMENT (OUTPATIENT)
Dept: INTERVENTIONAL RADIOLOGY/VASCULAR | Age: 39
DRG: 433 | End: 2024-01-30
Attending: STUDENT IN AN ORGANIZED HEALTH CARE EDUCATION/TRAINING PROGRAM
Payer: COMMERCIAL

## 2024-01-30 LAB
ACANTHOCYTES BLD QL SMEAR: ABNORMAL
ALBUMIN SERPL-MCNC: 3.5 G/DL (ref 3.4–5)
ALBUMIN/GLOB SERPL: 2.2 {RATIO} (ref 1.1–2.2)
ALP SERPL-CCNC: 158 U/L (ref 40–129)
ALT SERPL-CCNC: 21 U/L (ref 10–40)
ANA SER QL IA: NEGATIVE
ANION GAP SERPL CALCULATED.3IONS-SCNC: 8 MMOL/L (ref 3–16)
ANISOCYTOSIS BLD QL SMEAR: ABNORMAL
APPEARANCE FLUID: CLEAR
AST SERPL-CCNC: 48 U/L (ref 15–37)
BACTERIA FLD AEROBE CULT: NORMAL
BASOPHILS # BLD: 0.1 K/UL (ref 0–0.2)
BASOPHILS NFR BLD: 1 %
BDY FLUID QUALITY: NORMAL
BILIRUB SERPL-MCNC: 14.4 MG/DL (ref 0–1)
BUN SERPL-MCNC: 16 MG/DL (ref 7–20)
BURR CELLS BLD QL SMEAR: ABNORMAL
CALCIUM SERPL-MCNC: 8.2 MG/DL (ref 8.3–10.6)
CELL COUNT FLUID TYPE: NORMAL
CHLORIDE SERPL-SCNC: 90 MMOL/L (ref 99–110)
CO2 SERPL-SCNC: 24 MMOL/L (ref 21–32)
COLOR FLUID: YELLOW
CREAT SERPL-MCNC: <0.5 MG/DL (ref 0.6–1.1)
DEPRECATED RDW RBC AUTO: 26.2 % (ref 12.4–15.4)
EOSINOPHIL # BLD: 0.4 K/UL (ref 0–0.6)
EOSINOPHIL NFR BLD: 4 %
GFR SERPLBLD CREATININE-BSD FMLA CKD-EPI: >60 ML/MIN/{1.73_M2}
GLUCOSE BLD-MCNC: 118 MG/DL (ref 70–99)
GLUCOSE BLD-MCNC: 121 MG/DL (ref 70–99)
GLUCOSE BLD-MCNC: 155 MG/DL (ref 70–99)
GLUCOSE BLD-MCNC: 166 MG/DL (ref 70–99)
GLUCOSE SERPL-MCNC: 115 MG/DL (ref 70–99)
GRAM STN SPEC: NORMAL
HCT VFR BLD AUTO: 21.1 % (ref 36–48)
HCT VFR BLD AUTO: 23 % (ref 36–48)
HGB BLD-MCNC: 7.7 G/DL (ref 12–16)
HGB BLD-MCNC: 8.2 G/DL (ref 12–16)
HYPOCHROMIA BLD QL SMEAR: ABNORMAL
INR PPP: 3.01 (ref 0.84–1.16)
LYMPHOCYTES # BLD: 0.9 K/UL (ref 1–5.1)
LYMPHOCYTES NFR BLD: 9 %
LYMPHOCYTES NFR FLD: 30 %
MACROPHAGES # FLD: 46 %
MAGNESIUM SERPL-MCNC: 2 MG/DL (ref 1.8–2.4)
MCH RBC QN AUTO: 35.8 PG (ref 26–34)
MCHC RBC AUTO-ENTMCNC: 36.4 G/DL (ref 31–36)
MCV RBC AUTO: 98.4 FL (ref 80–100)
MESOTHL CELL NFR FLD: 8 %
MONOCYTES # BLD: 0.8 K/UL (ref 0–1.3)
MONOCYTES NFR BLD: 8 %
NEUTROPHIL, FLUID: 16 %
NEUTROPHILS # BLD: 8.1 K/UL (ref 1.7–7.7)
NEUTROPHILS NFR BLD: 75 %
NEUTS BAND NFR BLD MANUAL: 3 % (ref 0–7)
NUC CELL # FLD: 216 /CUMM
OVALOCYTES BLD QL SMEAR: ABNORMAL
PATH REV: NO
PERFORMED ON: ABNORMAL
PLATELET # BLD AUTO: 79 K/UL (ref 135–450)
PLATELET BLD QL SMEAR: ABNORMAL
PMV BLD AUTO: 6.8 FL (ref 5–10.5)
POIKILOCYTOSIS BLD QL SMEAR: ABNORMAL
POLYCHROMASIA BLD QL SMEAR: ABNORMAL
POTASSIUM SERPL-SCNC: 4.3 MMOL/L (ref 3.5–5.1)
PROT SERPL-MCNC: 5.1 G/DL (ref 6.4–8.2)
PROTHROMBIN TIME: 31 SEC (ref 11.5–14.8)
RBC # BLD AUTO: 2.14 M/UL (ref 4–5.2)
RBC FLUID: <1000 /CUMM
SCHISTOCYTES BLD QL SMEAR: ABNORMAL
SLIDE REVIEW: ABNORMAL
SODIUM SERPL-SCNC: 122 MMOL/L (ref 136–145)
TARGETS BLD QL SMEAR: ABNORMAL
TOTAL CELLS COUNTED FLD: 100
TOXIC GRANULES BLD QL SMEAR: PRESENT
WBC # BLD AUTO: 10.4 K/UL (ref 4–11)

## 2024-01-30 PROCEDURE — 36415 COLL VENOUS BLD VENIPUNCTURE: CPT

## 2024-01-30 PROCEDURE — 87205 SMEAR GRAM STAIN: CPT

## 2024-01-30 PROCEDURE — 0W9G3ZZ DRAINAGE OF PERITONEAL CAVITY, PERCUTANEOUS APPROACH: ICD-10-PCS | Performed by: RADIOLOGY

## 2024-01-30 PROCEDURE — 85014 HEMATOCRIT: CPT

## 2024-01-30 PROCEDURE — 87070 CULTURE OTHR SPECIMN AEROBIC: CPT

## 2024-01-30 PROCEDURE — 85025 COMPLETE CBC W/AUTO DIFF WBC: CPT

## 2024-01-30 PROCEDURE — P9047 ALBUMIN (HUMAN), 25%, 50ML: HCPCS | Performed by: INTERNAL MEDICINE

## 2024-01-30 PROCEDURE — 6370000000 HC RX 637 (ALT 250 FOR IP): Performed by: STUDENT IN AN ORGANIZED HEALTH CARE EDUCATION/TRAINING PROGRAM

## 2024-01-30 PROCEDURE — C1729 CATH, DRAINAGE: HCPCS

## 2024-01-30 PROCEDURE — 80053 COMPREHEN METABOLIC PANEL: CPT

## 2024-01-30 PROCEDURE — 83735 ASSAY OF MAGNESIUM: CPT

## 2024-01-30 PROCEDURE — 85018 HEMOGLOBIN: CPT

## 2024-01-30 PROCEDURE — 2060000000 HC ICU INTERMEDIATE R&B

## 2024-01-30 PROCEDURE — 6370000000 HC RX 637 (ALT 250 FOR IP): Performed by: INTERNAL MEDICINE

## 2024-01-30 PROCEDURE — 89051 BODY FLUID CELL COUNT: CPT

## 2024-01-30 PROCEDURE — 2580000003 HC RX 258: Performed by: STUDENT IN AN ORGANIZED HEALTH CARE EDUCATION/TRAINING PROGRAM

## 2024-01-30 PROCEDURE — C9113 INJ PANTOPRAZOLE SODIUM, VIA: HCPCS | Performed by: STUDENT IN AN ORGANIZED HEALTH CARE EDUCATION/TRAINING PROGRAM

## 2024-01-30 PROCEDURE — 6360000002 HC RX W HCPCS: Performed by: STUDENT IN AN ORGANIZED HEALTH CARE EDUCATION/TRAINING PROGRAM

## 2024-01-30 PROCEDURE — 87015 SPECIMEN INFECT AGNT CONCNTJ: CPT

## 2024-01-30 PROCEDURE — 85610 PROTHROMBIN TIME: CPT

## 2024-01-30 PROCEDURE — 49083 ABD PARACENTESIS W/IMAGING: CPT

## 2024-01-30 PROCEDURE — 6360000002 HC RX W HCPCS: Performed by: INTERNAL MEDICINE

## 2024-01-30 PROCEDURE — 94760 N-INVAS EAR/PLS OXIMETRY 1: CPT

## 2024-01-30 RX ADMIN — LACTULOSE 20 G: 20 SOLUTION ORAL at 20:13

## 2024-01-30 RX ADMIN — RIFAXIMIN 400 MG: 200 TABLET ORAL at 16:05

## 2024-01-30 RX ADMIN — MIDODRINE HYDROCHLORIDE 5 MG: 5 TABLET ORAL at 08:46

## 2024-01-30 RX ADMIN — RIFAXIMIN 400 MG: 200 TABLET ORAL at 08:46

## 2024-01-30 RX ADMIN — PANTOPRAZOLE SODIUM 40 MG: 40 INJECTION, POWDER, FOR SOLUTION INTRAVENOUS at 20:13

## 2024-01-30 RX ADMIN — FOLIC ACID 1 MG: 1 TABLET ORAL at 08:46

## 2024-01-30 RX ADMIN — LACTULOSE 20 G: 20 SOLUTION ORAL at 16:05

## 2024-01-30 RX ADMIN — Medication 10 ML: at 08:48

## 2024-01-30 RX ADMIN — LACTULOSE 20 G: 20 SOLUTION ORAL at 08:45

## 2024-01-30 RX ADMIN — Medication 15 G: at 08:45

## 2024-01-30 RX ADMIN — FUROSEMIDE 20 MG: 20 TABLET ORAL at 08:46

## 2024-01-30 RX ADMIN — ALBUMIN (HUMAN) 25 G: 0.25 INJECTION, SOLUTION INTRAVENOUS at 04:22

## 2024-01-30 RX ADMIN — FERROUS SULFATE TAB 325 MG (65 MG ELEMENTAL FE) 325 MG: 325 (65 FE) TAB at 08:46

## 2024-01-30 RX ADMIN — MIDODRINE HYDROCHLORIDE 5 MG: 5 TABLET ORAL at 17:00

## 2024-01-30 RX ADMIN — RIFAXIMIN 400 MG: 200 TABLET ORAL at 20:41

## 2024-01-30 RX ADMIN — Medication 5 ML: at 20:13

## 2024-01-30 RX ADMIN — MIDODRINE HYDROCHLORIDE 5 MG: 5 TABLET ORAL at 11:32

## 2024-01-30 RX ADMIN — ALBUMIN (HUMAN) 25 G: 0.25 INJECTION, SOLUTION INTRAVENOUS at 12:00

## 2024-01-30 RX ADMIN — FUROSEMIDE 20 MG: 20 TABLET ORAL at 17:00

## 2024-01-30 RX ADMIN — ALBUMIN (HUMAN) 25 G: 0.25 INJECTION, SOLUTION INTRAVENOUS at 20:26

## 2024-01-30 RX ADMIN — PANTOPRAZOLE SODIUM 40 MG: 40 INJECTION, POWDER, FOR SOLUTION INTRAVENOUS at 08:47

## 2024-01-30 ASSESSMENT — PAIN DESCRIPTION - FREQUENCY: FREQUENCY: INTERMITTENT

## 2024-01-30 ASSESSMENT — PAIN DESCRIPTION - ORIENTATION: ORIENTATION: LEFT;UPPER

## 2024-01-30 ASSESSMENT — PAIN SCALES - GENERAL
PAINLEVEL_OUTOF10: 0
PAINLEVEL_OUTOF10: 4

## 2024-01-30 ASSESSMENT — PAIN SCALES - WONG BAKER
WONGBAKER_NUMERICALRESPONSE: 0

## 2024-01-30 ASSESSMENT — PAIN DESCRIPTION - ONSET: ONSET: GRADUAL

## 2024-01-30 ASSESSMENT — PAIN DESCRIPTION - LOCATION: LOCATION: ABDOMEN

## 2024-01-30 ASSESSMENT — PAIN DESCRIPTION - PAIN TYPE: TYPE: CHRONIC PAIN

## 2024-01-30 ASSESSMENT — PAIN - FUNCTIONAL ASSESSMENT: PAIN_FUNCTIONAL_ASSESSMENT: ACTIVITIES ARE NOT PREVENTED

## 2024-01-30 ASSESSMENT — PAIN DESCRIPTION - DESCRIPTORS: DESCRIPTORS: DISCOMFORT

## 2024-01-30 NOTE — CONSULTS
[facility]    Consult Note      Requesting Physician:  The hospitalist    CHIEF COMPLAINT:  Confusion      HISTORY OF PRESENT ILLNESS:    Ms. Gonzalez  is a 38 y.o. female we are seeing in consultation for Macrocytic anemia.  She was admitted for hepatic encephalopathy.  She is known to have cirrhosis due to alcohol use.  Her ammonia level was found to be high.  She was recently hospitalized for hyponatremia and hepatic encephalopathy.  Patient states that her last alcohol use was in September.  She has anemia and thrombocytopenia.  There is a concern for hemolysis.      Past Medical History:  Past Medical History:   Diagnosis Date    ADHD     Cirrhosis (HCC)     Dyslexia     ETOH abuse     Hepatic cirrhosis (HCC)     History of blood transfusion        Past Surgical History:  Past Surgical History:   Procedure Laterality Date    PARACENTESIS  10/14/2023    had several: most recent Friday the 12th Jan    UPPER GASTROINTESTINAL ENDOSCOPY N/A 11/15/2023    EGD BIOPSY performed by Jone Burns MD at [facility] ENDOSCOPY       Current Medications:  Current Facility-Administered Medications   Medication Dose Route Frequency Provider Last Rate Last Admin    0.9 % sodium chloride infusion   IntraVENous PRN Mario Claudio MD        urea (URE-NA) packet 15 g  15 g Oral Daily Yevgeniy Baldwin MD   15 g at 01/29/24 1251    albumin human 25% IV solution 25 g  25 g IntraVENous Q8H Yevgeniy Baldwin MD   Stopped at 01/29/24 1644    furosemide (LASIX) tablet 20 mg  20 mg Oral BID Mario Claudio MD   20 mg at 01/29/24 1703    traMADol (ULTRAM) tablet 50 mg  50 mg Oral Q6H PRN Mario Claudio MD   50 mg at 01/28/24 2217    pantoprazole (PROTONIX) injection 40 mg  40 mg IntraVENous BID Mario Claudio MD   40 mg at 01/29/24 1250    carvedilol (COREG) tablet 3.125 mg  3.125 mg Oral BID  Mario Claudio MD

## 2024-01-30 NOTE — BRIEF OP NOTE
Brief Postoperative Note    Angélica Gonzalez  YOB: 1985  [id number]    Pre-operative Diagnosis: ascites    Post-operative Diagnosis: Same    Procedure: US-guided paracentesis    Anesthesia: Local    Surgeons: Kermit Espino MD    Estimated Blood Loss: Less than 5 mL    Complications: None    Specimens: ascites drained    Findings: Successful US-guided paracentesis.    Electronically signed by Kermit Espino MD on 1/30/2024 at 3:06 PM

## 2024-01-30 NOTE — ACP (ADVANCE CARE PLANNING)
Advance Care Planning     Advance Care Planning Inpatient Note  Spiritual Care Department    Today's Date: 1/30/2024  Unit: [facility] [place] NURSING    Received request from HealthCare Provider.  Upon review of chart and communication with care team, patient's decision making abilities are not in question.. Patient was/were present in the room during visit.    Goals of ACP Conversation:  The pt is with healthcare workers and then on phone with her counselor.  will follow up later today to have POA/living will conversation.        Electronically signed by DEBORA Morrow on 1/30/2024 at 9:22 AM

## 2024-01-31 LAB
ACANTHOCYTES BLD QL SMEAR: ABNORMAL
ALBUMIN SERPL-MCNC: 3.8 G/DL (ref 3.4–5)
ALBUMIN/GLOB SERPL: 2.2 {RATIO} (ref 1.1–2.2)
ALP SERPL-CCNC: 199 U/L (ref 40–129)
ALT SERPL-CCNC: 19 U/L (ref 10–40)
ANION GAP SERPL CALCULATED.3IONS-SCNC: 8 MMOL/L (ref 3–16)
ANISOCYTOSIS BLD QL SMEAR: ABNORMAL
AST SERPL-CCNC: 55 U/L (ref 15–37)
BACTERIA BLD CULT ORG #2: NORMAL
BACTERIA BLD CULT: NORMAL
BASOPHILS # BLD: 0 K/UL (ref 0–0.2)
BASOPHILS NFR BLD: 0 %
BILIRUB SERPL-MCNC: 14.6 MG/DL (ref 0–1)
BUN SERPL-MCNC: 13 MG/DL (ref 7–20)
BURR CELLS BLD QL SMEAR: ABNORMAL
CALCIUM SERPL-MCNC: 8.5 MG/DL (ref 8.3–10.6)
CHLORIDE SERPL-SCNC: 89 MMOL/L (ref 99–110)
CO2 SERPL-SCNC: 24 MMOL/L (ref 21–32)
CREAT SERPL-MCNC: <0.5 MG/DL (ref 0.6–1.1)
DEPRECATED RDW RBC AUTO: 25.5 % (ref 12.4–15.4)
EOSINOPHIL # BLD: 0.2 K/UL (ref 0–0.6)
EOSINOPHIL NFR BLD: 2 %
GFR SERPLBLD CREATININE-BSD FMLA CKD-EPI: >60 ML/MIN/{1.73_M2}
GLUCOSE BLD-MCNC: 115 MG/DL (ref 70–99)
GLUCOSE BLD-MCNC: 157 MG/DL (ref 70–99)
GLUCOSE BLD-MCNC: 182 MG/DL (ref 70–99)
GLUCOSE SERPL-MCNC: 133 MG/DL (ref 70–99)
HCT VFR BLD AUTO: 22.5 % (ref 36–48)
HGB BLD-MCNC: 8 G/DL (ref 12–16)
LYMPHOCYTES # BLD: 0.4 K/UL (ref 1–5.1)
LYMPHOCYTES NFR BLD: 4 %
MACROCYTES BLD QL SMEAR: ABNORMAL
MAGNESIUM SERPL-MCNC: 1.9 MG/DL (ref 1.8–2.4)
MCH RBC QN AUTO: 35.5 PG (ref 26–34)
MCHC RBC AUTO-ENTMCNC: 35.7 G/DL (ref 31–36)
MCV RBC AUTO: 99.4 FL (ref 80–100)
MONOCYTES # BLD: 0.3 K/UL (ref 0–1.3)
MONOCYTES NFR BLD: 3 %
NEUTROPHILS # BLD: 8.8 K/UL (ref 1.7–7.7)
NEUTROPHILS NFR BLD: 90 %
NEUTS BAND NFR BLD MANUAL: 1 % (ref 0–7)
PERFORMED ON: ABNORMAL
PLATELET # BLD AUTO: 89 K/UL (ref 135–450)
PLATELET BLD QL SMEAR: ABNORMAL
PMV BLD AUTO: 7.1 FL (ref 5–10.5)
POIKILOCYTOSIS BLD QL SMEAR: ABNORMAL
POLYCHROMASIA BLD QL SMEAR: ABNORMAL
POTASSIUM SERPL-SCNC: 4.4 MMOL/L (ref 3.5–5.1)
PROT SERPL-MCNC: 5.5 G/DL (ref 6.4–8.2)
RBC # BLD AUTO: 2.27 M/UL (ref 4–5.2)
SCHISTOCYTES BLD QL SMEAR: ABNORMAL
SLIDE REVIEW: ABNORMAL
SODIUM SERPL-SCNC: 121 MMOL/L (ref 136–145)
TOXIC GRANULES BLD QL SMEAR: PRESENT
WBC # BLD AUTO: 9.7 K/UL (ref 4–11)

## 2024-01-31 PROCEDURE — 6360000002 HC RX W HCPCS: Performed by: STUDENT IN AN ORGANIZED HEALTH CARE EDUCATION/TRAINING PROGRAM

## 2024-01-31 PROCEDURE — 97530 THERAPEUTIC ACTIVITIES: CPT

## 2024-01-31 PROCEDURE — C9113 INJ PANTOPRAZOLE SODIUM, VIA: HCPCS | Performed by: STUDENT IN AN ORGANIZED HEALTH CARE EDUCATION/TRAINING PROGRAM

## 2024-01-31 PROCEDURE — 80053 COMPREHEN METABOLIC PANEL: CPT

## 2024-01-31 PROCEDURE — 97161 PT EVAL LOW COMPLEX 20 MIN: CPT

## 2024-01-31 PROCEDURE — 97165 OT EVAL LOW COMPLEX 30 MIN: CPT

## 2024-01-31 PROCEDURE — 6370000000 HC RX 637 (ALT 250 FOR IP): Performed by: INTERNAL MEDICINE

## 2024-01-31 PROCEDURE — 2060000000 HC ICU INTERMEDIATE R&B

## 2024-01-31 PROCEDURE — 83735 ASSAY OF MAGNESIUM: CPT

## 2024-01-31 PROCEDURE — 97116 GAIT TRAINING THERAPY: CPT

## 2024-01-31 PROCEDURE — 97535 SELF CARE MNGMENT TRAINING: CPT

## 2024-01-31 PROCEDURE — 6370000000 HC RX 637 (ALT 250 FOR IP): Performed by: STUDENT IN AN ORGANIZED HEALTH CARE EDUCATION/TRAINING PROGRAM

## 2024-01-31 PROCEDURE — 6370000000 HC RX 637 (ALT 250 FOR IP): Performed by: NURSE PRACTITIONER

## 2024-01-31 PROCEDURE — 85025 COMPLETE CBC W/AUTO DIFF WBC: CPT

## 2024-01-31 PROCEDURE — P9047 ALBUMIN (HUMAN), 25%, 50ML: HCPCS | Performed by: INTERNAL MEDICINE

## 2024-01-31 PROCEDURE — 6360000002 HC RX W HCPCS: Performed by: INTERNAL MEDICINE

## 2024-01-31 PROCEDURE — 36415 COLL VENOUS BLD VENIPUNCTURE: CPT

## 2024-01-31 PROCEDURE — 2580000003 HC RX 258: Performed by: STUDENT IN AN ORGANIZED HEALTH CARE EDUCATION/TRAINING PROGRAM

## 2024-01-31 RX ORDER — SODIUM CHLORIDE 1 G/1
1 TABLET ORAL 2 TIMES DAILY WITH MEALS
Status: DISCONTINUED | OUTPATIENT
Start: 2024-01-31 | End: 2024-02-01 | Stop reason: HOSPADM

## 2024-01-31 RX ORDER — TOLVAPTAN 15 MG/1
15 TABLET ORAL DAILY
Status: DISCONTINUED | OUTPATIENT
Start: 2024-01-31 | End: 2024-02-01 | Stop reason: HOSPADM

## 2024-01-31 RX ADMIN — PANTOPRAZOLE SODIUM 40 MG: 40 INJECTION, POWDER, FOR SOLUTION INTRAVENOUS at 21:20

## 2024-01-31 RX ADMIN — FOLIC ACID 1 MG: 1 TABLET ORAL at 08:44

## 2024-01-31 RX ADMIN — FUROSEMIDE 20 MG: 20 TABLET ORAL at 08:44

## 2024-01-31 RX ADMIN — Medication 10 ML: at 08:44

## 2024-01-31 RX ADMIN — POLYVINYL ALCOHOL, POVIDONE 1 DROP: 14; 6 SOLUTION/ DROPS OPHTHALMIC at 21:28

## 2024-01-31 RX ADMIN — FUROSEMIDE 20 MG: 20 TABLET ORAL at 17:29

## 2024-01-31 RX ADMIN — RIFAXIMIN 400 MG: 200 TABLET ORAL at 21:20

## 2024-01-31 RX ADMIN — TOLVAPTAN 15 MG: 15 TABLET ORAL at 17:29

## 2024-01-31 RX ADMIN — FERROUS SULFATE TAB 325 MG (65 MG ELEMENTAL FE) 325 MG: 325 (65 FE) TAB at 08:44

## 2024-01-31 RX ADMIN — LACTULOSE 20 G: 20 SOLUTION ORAL at 15:37

## 2024-01-31 RX ADMIN — TRAMADOL HYDROCHLORIDE 50 MG: 50 TABLET ORAL at 21:20

## 2024-01-31 RX ADMIN — PANTOPRAZOLE SODIUM 40 MG: 40 INJECTION, POWDER, FOR SOLUTION INTRAVENOUS at 08:44

## 2024-01-31 RX ADMIN — ALBUMIN (HUMAN) 25 G: 0.25 INJECTION, SOLUTION INTRAVENOUS at 04:37

## 2024-01-31 RX ADMIN — LACTULOSE 20 G: 20 SOLUTION ORAL at 21:20

## 2024-01-31 RX ADMIN — RIFAXIMIN 400 MG: 200 TABLET ORAL at 15:37

## 2024-01-31 RX ADMIN — MIDODRINE HYDROCHLORIDE 5 MG: 5 TABLET ORAL at 11:47

## 2024-01-31 RX ADMIN — Medication 15 G: at 08:44

## 2024-01-31 RX ADMIN — LACTULOSE 20 G: 20 SOLUTION ORAL at 08:44

## 2024-01-31 RX ADMIN — MIDODRINE HYDROCHLORIDE 5 MG: 5 TABLET ORAL at 17:29

## 2024-01-31 RX ADMIN — MIDODRINE HYDROCHLORIDE 5 MG: 5 TABLET ORAL at 08:44

## 2024-01-31 RX ADMIN — SODIUM CHLORIDE 1 G: 1 TABLET ORAL at 17:29

## 2024-01-31 RX ADMIN — Medication 10 ML: at 21:21

## 2024-01-31 RX ADMIN — RIFAXIMIN 400 MG: 200 TABLET ORAL at 08:44

## 2024-01-31 ASSESSMENT — PAIN SCALES - GENERAL
PAINLEVEL_OUTOF10: 0
PAINLEVEL_OUTOF10: 8

## 2024-01-31 ASSESSMENT — PAIN DESCRIPTION - ORIENTATION: ORIENTATION: LOWER

## 2024-01-31 ASSESSMENT — PAIN DESCRIPTION - LOCATION: LOCATION: ABDOMEN

## 2024-01-31 ASSESSMENT — PAIN DESCRIPTION - DESCRIPTORS: DESCRIPTORS: DISCOMFORT

## 2024-01-31 NOTE — PLAN OF CARE
Problem: Discharge Planning  Goal: Discharge to home or other facility with appropriate resources  1/31/2024 1557 by Kamille Sam RN  Outcome: Progressing     Problem: Skin/Tissue Integrity  Goal: Absence of new skin breakdown  Description: 1.  Monitor for areas of redness and/or skin breakdown  2.  Assess vascular access sites hourly  3.  Every 4-6 hours minimum:  Change oxygen saturation probe site  4.  Every 4-6 hours:  If on nasal continuous positive airway pressure, respiratory therapy assess nares and determine need for appliance change or resting period.  1/31/2024 1557 by Kamille Sam RN  Outcome: Progressing     Problem: ABCDS Injury Assessment  Goal: Absence of physical injury  1/31/2024 1557 by Kamille Sam RN  Outcome: Progressing     Problem: Safety - Adult  Goal: Free from fall injury  1/31/2024 1557 by Kamille Sam RN  Outcome: Progressing     Problem: Gastrointestinal - Adult  Goal: Maintains or returns to baseline bowel function  1/31/2024 1557 by Kamille Sam RN  Outcome: Progressing     Problem: Neurosensory - Adult  Goal: Achieves stable or improved neurological status  1/31/2024 1557 by Kamille Sam RN  Outcome: Progressing     Problem: Hematologic - Adult  Goal: Maintains hematologic stability  Outcome: Progressing     Problem: Skin/Tissue Integrity - Adult  Goal: Skin integrity remains intact  Outcome: Progressing     Problem: Pain  Goal: Verbalizes/displays adequate comfort level or baseline comfort level  1/31/2024 1557 by Kamille Sam RN  Outcome: Progressing

## 2024-01-31 NOTE — PLAN OF CARE
Problem: Discharge Planning  Goal: Discharge to home or other facility with appropriate resources  Outcome: Progressing  Flowsheets (Taken 1/31/2024 0556)  Discharge to home or other facility with appropriate resources: Identify barriers to discharge with patient and caregiver     Problem: Skin/Tissue Integrity  Goal: Absence of new skin breakdown  Description: 1.  Monitor for areas of redness and/or skin breakdown  2.  Assess vascular access sites hourly  3.  Every 4-6 hours minimum:  Change oxygen saturation probe site  4.  Every 4-6 hours:  If on nasal continuous positive airway pressure, respiratory therapy assess nares and determine need for appliance change or resting period.  Outcome: Progressing     Problem: ABCDS Injury Assessment  Goal: Absence of physical injury  Outcome: Progressing  Flowsheets (Taken 1/31/2024 0556)  Absence of Physical Injury: Implement safety measures based on patient assessment     Problem: Safety - Adult  Goal: Free from fall injury  Outcome: Progressing  Flowsheets (Taken 1/31/2024 0556)  Free From Fall Injury: Instruct family/caregiver on patient safety     Problem: Gastrointestinal - Adult  Goal: Maintains or returns to baseline bowel function  Outcome: Progressing  Flowsheets (Taken 1/31/2024 0556)  Maintains or returns to baseline bowel function:   Assess bowel function   Encourage oral fluids to ensure adequate hydration   Administer ordered medications as needed     Problem: Neurosensory - Adult  Goal: Achieves stable or improved neurological status  Outcome: Progressing  Flowsheets (Taken 1/31/2024 0556)  Achieves stable or improved neurological status: Assess for and report changes in neurological status     Problem: Pain  Goal: Verbalizes/displays adequate comfort level or baseline comfort level  Outcome: Progressing  Flowsheets (Taken 1/31/2024 0556)  Verbalizes/displays adequate comfort level or baseline comfort level:   Encourage patient to monitor pain and request

## 2024-02-01 VITALS
HEART RATE: 101 BPM | RESPIRATION RATE: 18 BRPM | SYSTOLIC BLOOD PRESSURE: 106 MMHG | BODY MASS INDEX: 30.81 KG/M2 | OXYGEN SATURATION: 100 % | DIASTOLIC BLOOD PRESSURE: 67 MMHG | TEMPERATURE: 98 F | WEIGHT: 184.9 LBS | HEIGHT: 65 IN

## 2024-02-01 LAB
ALBUMIN SERPL-MCNC: 4 G/DL (ref 3.4–5)
ALBUMIN SERPL-MCNC: 4.3 G/DL (ref 3.4–5)
ALBUMIN/GLOB SERPL: 2.5 {RATIO} (ref 1.1–2.2)
ALP SERPL-CCNC: 214 U/L (ref 40–129)
ALT SERPL-CCNC: 23 U/L (ref 10–40)
ANION GAP SERPL CALCULATED.3IONS-SCNC: 11 MMOL/L (ref 3–16)
ANION GAP SERPL CALCULATED.3IONS-SCNC: 9 MMOL/L (ref 3–16)
ANISOCYTOSIS BLD QL SMEAR: ABNORMAL
AST SERPL-CCNC: 63 U/L (ref 15–37)
BASOPHILS # BLD: 0 K/UL (ref 0–0.2)
BASOPHILS NFR BLD: 0 %
BILIRUB SERPL-MCNC: 14.3 MG/DL (ref 0–1)
BUN SERPL-MCNC: 11 MG/DL (ref 7–20)
BUN SERPL-MCNC: 31 MG/DL (ref 7–20)
BURR CELLS BLD QL SMEAR: ABNORMAL
CALCIUM SERPL-MCNC: 8.7 MG/DL (ref 8.3–10.6)
CALCIUM SERPL-MCNC: 8.7 MG/DL (ref 8.3–10.6)
CHLORIDE SERPL-SCNC: 91 MMOL/L (ref 99–110)
CHLORIDE SERPL-SCNC: 92 MMOL/L (ref 99–110)
CO2 SERPL-SCNC: 23 MMOL/L (ref 21–32)
CO2 SERPL-SCNC: 24 MMOL/L (ref 21–32)
CREAT SERPL-MCNC: <0.5 MG/DL (ref 0.6–1.1)
CREAT SERPL-MCNC: <0.5 MG/DL (ref 0.6–1.1)
CREAT UR-MCNC: 10.1 MG/DL (ref 28–259)
CREAT UR-MCNC: 10.9 MG/DL (ref 28–259)
DEPRECATED RDW RBC AUTO: 25.9 % (ref 12.4–15.4)
EOSINOPHIL # BLD: 0 K/UL (ref 0–0.6)
EOSINOPHIL NFR BLD: 0 %
GFR SERPLBLD CREATININE-BSD FMLA CKD-EPI: >60 ML/MIN/{1.73_M2}
GFR SERPLBLD CREATININE-BSD FMLA CKD-EPI: >60 ML/MIN/{1.73_M2}
GLUCOSE BLD-MCNC: 111 MG/DL (ref 70–99)
GLUCOSE BLD-MCNC: 137 MG/DL (ref 70–99)
GLUCOSE SERPL-MCNC: 105 MG/DL (ref 70–99)
GLUCOSE SERPL-MCNC: 117 MG/DL (ref 70–99)
HCT VFR BLD AUTO: 21.7 % (ref 36–48)
HGB BLD-MCNC: 7.8 G/DL (ref 12–16)
LYMPHOCYTES # BLD: 0.9 K/UL (ref 1–5.1)
LYMPHOCYTES NFR BLD: 10 %
MACROCYTES BLD QL SMEAR: ABNORMAL
MAGNESIUM SERPL-MCNC: 2.1 MG/DL (ref 1.8–2.4)
MCH RBC QN AUTO: 36 PG (ref 26–34)
MCHC RBC AUTO-ENTMCNC: 35.9 G/DL (ref 31–36)
MCV RBC AUTO: 100.2 FL (ref 80–100)
MICROALBUMIN UR DL<=1MG/L-MCNC: <1.2 MG/DL
MICROALBUMIN/CREAT UR: ABNORMAL MG/G (ref 0–30)
MONOCYTES # BLD: 0.3 K/UL (ref 0–1.3)
MONOCYTES NFR BLD: 3 %
NEUTROPHILS # BLD: 8.2 K/UL (ref 1.7–7.7)
NEUTROPHILS NFR BLD: 86 %
NEUTS BAND NFR BLD MANUAL: 1 % (ref 0–7)
PERFORMED ON: ABNORMAL
PERFORMED ON: ABNORMAL
PHOSPHATE SERPL-MCNC: 2.7 MG/DL (ref 2.5–4.9)
PLATELET # BLD AUTO: 85 K/UL (ref 135–450)
PLATELET BLD QL SMEAR: ABNORMAL
PMV BLD AUTO: 6.9 FL (ref 5–10.5)
POLYCHROMASIA BLD QL SMEAR: ABNORMAL
POTASSIUM SERPL-SCNC: 3.6 MMOL/L (ref 3.5–5.1)
POTASSIUM SERPL-SCNC: 3.7 MMOL/L (ref 3.5–5.1)
PROT SERPL-MCNC: 5.6 G/DL (ref 6.4–8.2)
PROT UR-MCNC: <4 MG/DL
PROT/CREAT UR-RTO: ABNORMAL MG/DL
RBC # BLD AUTO: 2.17 M/UL (ref 4–5.2)
SCHISTOCYTES BLD QL SMEAR: ABNORMAL
SLIDE REVIEW: ABNORMAL
SODIUM SERPL-SCNC: 124 MMOL/L (ref 136–145)
SODIUM SERPL-SCNC: 126 MMOL/L (ref 136–145)
TOXIC GRANULES BLD QL SMEAR: PRESENT
WBC # BLD AUTO: 9.4 K/UL (ref 4–11)

## 2024-02-01 PROCEDURE — 6370000000 HC RX 637 (ALT 250 FOR IP): Performed by: INTERNAL MEDICINE

## 2024-02-01 PROCEDURE — 6370000000 HC RX 637 (ALT 250 FOR IP): Performed by: STUDENT IN AN ORGANIZED HEALTH CARE EDUCATION/TRAINING PROGRAM

## 2024-02-01 PROCEDURE — 85025 COMPLETE CBC W/AUTO DIFF WBC: CPT

## 2024-02-01 PROCEDURE — 80053 COMPREHEN METABOLIC PANEL: CPT

## 2024-02-01 PROCEDURE — 83735 ASSAY OF MAGNESIUM: CPT

## 2024-02-01 PROCEDURE — 6360000002 HC RX W HCPCS: Performed by: STUDENT IN AN ORGANIZED HEALTH CARE EDUCATION/TRAINING PROGRAM

## 2024-02-01 PROCEDURE — 36415 COLL VENOUS BLD VENIPUNCTURE: CPT

## 2024-02-01 PROCEDURE — 2580000003 HC RX 258: Performed by: STUDENT IN AN ORGANIZED HEALTH CARE EDUCATION/TRAINING PROGRAM

## 2024-02-01 PROCEDURE — 82043 UR ALBUMIN QUANTITATIVE: CPT

## 2024-02-01 PROCEDURE — C9113 INJ PANTOPRAZOLE SODIUM, VIA: HCPCS | Performed by: STUDENT IN AN ORGANIZED HEALTH CARE EDUCATION/TRAINING PROGRAM

## 2024-02-01 PROCEDURE — 82570 ASSAY OF URINE CREATININE: CPT

## 2024-02-01 PROCEDURE — 84156 ASSAY OF PROTEIN URINE: CPT

## 2024-02-01 RX ORDER — POTASSIUM CHLORIDE 750 MG/1
10 TABLET, EXTENDED RELEASE ORAL DAILY
Qty: 10 TABLET | Refills: 0 | Status: SHIPPED | OUTPATIENT
Start: 2024-02-01 | End: 2024-02-11

## 2024-02-01 RX ORDER — LACTULOSE 10 G/15ML
20 SOLUTION ORAL 3 TIMES DAILY
Qty: 2700 ML | Refills: 0 | Status: SHIPPED | OUTPATIENT
Start: 2024-02-01 | End: 2024-03-02

## 2024-02-01 RX ORDER — SODIUM CHLORIDE 1 G/1
1 TABLET ORAL 3 TIMES DAILY
Qty: 90 TABLET | Refills: 3 | Status: SHIPPED | OUTPATIENT
Start: 2024-02-01

## 2024-02-01 RX ADMIN — MIDODRINE HYDROCHLORIDE 5 MG: 5 TABLET ORAL at 12:29

## 2024-02-01 RX ADMIN — RIFAXIMIN 400 MG: 200 TABLET ORAL at 08:07

## 2024-02-01 RX ADMIN — Medication 10 ML: at 08:08

## 2024-02-01 RX ADMIN — LACTULOSE 20 G: 20 SOLUTION ORAL at 14:12

## 2024-02-01 RX ADMIN — TOLVAPTAN 15 MG: 15 TABLET ORAL at 10:00

## 2024-02-01 RX ADMIN — Medication 30 G: at 08:08

## 2024-02-01 RX ADMIN — FUROSEMIDE 20 MG: 20 TABLET ORAL at 08:07

## 2024-02-01 RX ADMIN — FOLIC ACID 1 MG: 1 TABLET ORAL at 08:09

## 2024-02-01 RX ADMIN — FERROUS SULFATE TAB 325 MG (65 MG ELEMENTAL FE) 325 MG: 325 (65 FE) TAB at 08:07

## 2024-02-01 RX ADMIN — SODIUM CHLORIDE 1 G: 1 TABLET ORAL at 08:08

## 2024-02-01 RX ADMIN — LACTULOSE 20 G: 20 SOLUTION ORAL at 08:07

## 2024-02-01 RX ADMIN — PANTOPRAZOLE SODIUM 40 MG: 40 INJECTION, POWDER, FOR SOLUTION INTRAVENOUS at 08:08

## 2024-02-01 RX ADMIN — RIFAXIMIN 400 MG: 200 TABLET ORAL at 14:12

## 2024-02-01 RX ADMIN — MIDODRINE HYDROCHLORIDE 5 MG: 5 TABLET ORAL at 08:08

## 2024-02-01 NOTE — PROGRESS NOTES
Gastroenterology Progress Note      Angélica Gonzalez is a 38 y.o. female patient.  No diagnosis found.    SUBJECTIVE:  Had 4-5 BMs yesterday with lactulose. No gross GI bleeding.       Physical    VITALS:  BP (!) 101/59   Pulse 83   Temp 98 °F (36.7 °C)   Resp 17   Ht 1.651 m (5' 5\")   Wt 82.6 kg (182 lb)   SpO2 96%   BMI 30.29 kg/m²   TEMPERATURE:  Current - Temp: 98 °F (36.7 °C); Max - Temp  Av.4 °F (36.9 °C)  Min: 97.9 °F (36.6 °C)  Max: 99.4 °F (37.4 °C)    NAD  jaundiced  Regular rate  Lungs CTA Bilaterally  Abdomen soft, mild distention, nontender, Bowel sounds present  Alert and oriented, no asterixis    Data    Data Review:    Recent Labs     24  1810 24  0506 24  1805 24  0442   WBC 9.5 11.7*  --  12.3*   HGB 8.1* 7.4* 7.4* 7.0*   HCT 22.3* 20.7*  20.6* 20.9* 19.5*   .8* 100.7*  --  101.7*   PLT 89* 89*  --  86*     Recent Labs     24  0506 24  0442   * 128* 120*   K 4.1 4.2 3.8   CL 95* 93* 89*   CO2 20* 21 21   BUN 26* 25* 13   CREATININE <0.5* <0.5* <0.5*     Recent Labs     24  1810 24  0506 24  0753 24  0442   AST 59* 65*  --  53*   ALT 24 22  --  21   BILIDIR  --   --  6.0*  --    BILITOT 17.3* 17.9* 18.2* 14.9*   ALKPHOS 198* 145*  --  147*     No results for input(s): \"LIPASE\", \"AMYLASE\" in the last 72 hours.  Recent Labs     24  0753   PROTIME 30.9*   INR 2.99*     No results for input(s): \"PTT\" in the last 72 hours.           ASSESSMENT / PLAN      Hepatic encephalopathy -  was not taking lactulose and xifaxan at home due to cost. HE likely due to not taking meds.   - lactulose TID and titrate for goal of 3 BMs daily.     Cirrhosis - due to alcohol abuse. No longer drinking. Has established with Dr Alexander at  liver transplant. Needs 12 weeks of substance use counseling prior to being considered for liver transplant.     Ascites - diuretic resistant. s/p paracentesis. Cell 
            Gastroenterology Progress Note      Angélica Gonzalez is a 38 y.o. female patient.  No diagnosis found.    SUBJECTIVE:  Having BMs with lactulose. No gross GI bleeding.  Abdomen getting bergman per pt.        Physical    VITALS:  BP (!) 101/59   Pulse 83   Temp 98 °F (36.7 °C)   Resp 17   Ht 1.651 m (5' 5\")   Wt 82.6 kg (182 lb)   SpO2 96%   BMI 30.29 kg/m²   TEMPERATURE:  Current - Temp: 98 °F (36.7 °C); Max - Temp  Av.4 °F (36.9 °C)  Min: 97.9 °F (36.6 °C)  Max: 99.4 °F (37.4 °C)    NAD  jaundiced  Regular rate  Lungs CTA Bilaterally  Abdomen distention but not tense, nontender, Bowel sounds present  Alert and oriented, no asterixis    Data    Data Review:    Recent Labs     24  0506 24  1805 24  0442 24  1326 24  1748 24  0434   WBC 11.7*  --  12.3*  --   --  10.4   HGB 7.4*   < > 7.0* 7.9* 8.1* 7.7*   HCT 20.7*  20.6*   < > 19.5* 22.3* 23.8* 21.1*   .7*  --  101.7*  --   --  98.4   PLT 89*  --  86*  --   --  79*    < > = values in this interval not displayed.       Recent Labs     24  0506 24  04424  0434   * 120* 122*   K 4.2 3.8 4.3   CL 93* 89* 90*   CO2 21 21 24   BUN 25* 13 16   CREATININE <0.5* <0.5* <0.5*       Recent Labs     24  0506 24  0753 24  0442 24  0434   AST 65*  --  53* 48*   ALT 22  --  21 21   BILIDIR  --  6.0*  --   --    BILITOT 17.9* 18.2* 14.9* 14.4*   ALKPHOS 145*  --  147* 158*       No results for input(s): \"LIPASE\", \"AMYLASE\" in the last 72 hours.  Recent Labs     24  0753 24  0852   PROTIME 30.9* 31.0*   INR 2.99* 3.01*       No results for input(s): \"PTT\" in the last 72 hours.           ASSESSMENT / PLAN      Hepatic encephalopathy -  was not taking lactulose and xifaxan at home due to cost. HE likely due to not taking meds.   - lactulose TID and titrate for goal of 3 BMs daily.     Cirrhosis - due to alcohol abuse. No longer drinking. Has established 
            Gastroenterology Progress Note      Angélica Gonzalez is a 38 y.o. female patient.  No diagnosis found.    SUBJECTIVE:  Having BMs with lactulose. No gross GI bleeding.  No abdominal pain.        Physical    VITALS:  BP (!) 101/59   Pulse 83   Temp 98 °F (36.7 °C)   Resp 17   Ht 1.651 m (5' 5\")   Wt 82.6 kg (182 lb)   SpO2 96%   BMI 30.29 kg/m²   TEMPERATURE:  Current - Temp: 98 °F (36.7 °C); Max - Temp  Av.4 °F (36.9 °C)  Min: 97.9 °F (36.6 °C)  Max: 99.4 °F (37.4 °C)    NAD  jaundiced  Regular rate  Lungs CTA Bilaterally  Abdomen mild distention but not tense, nontender, Bowel sounds present  Alert and oriented, no asterixis    Data    Data Review:    Recent Labs     24  1326 24  04324  1637 24   WBC 12.3*  --  10.4  --  9.7   HGB 7.0*   < > 7.7* 8.2* 8.0*   HCT 19.5*   < > 21.1* 23.0* 22.5*   .7*  --  98.4  --  99.4   PLT 86*  --  79*  --  89*    < > = values in this interval not displayed.       Recent Labs     24   * 122* 121*   K 3.8 4.3 4.4   CL 89* 90* 89*   CO2  24   BUN 13 16 13   CREATININE <0.5* <0.5* <0.5*       Recent Labs     24  04324   AST 53* 48* 55*   ALT 21  19   BILITOT 14.9* 14.4* 14.6*   ALKPHOS 147* 158* 199*       No results for input(s): \"LIPASE\", \"AMYLASE\" in the last 72 hours.  Recent Labs     24  0852   PROTIME 31.0*   INR 3.01*       No results for input(s): \"PTT\" in the last 72 hours.           ASSESSMENT / PLAN      Hepatic encephalopathy -  was not taking lactulose and xifaxan at home due to cost. HE likely due to not taking meds.   - lactulose TID and titrate for goal of 3 BMs daily.     Cirrhosis - due to alcohol abuse. No longer drinking. Has established with Dr Alexander at  liver transplant. Needs 12 weeks of substance use counseling prior to being considered for liver transplant.   - follow up with  
            Gastroenterology Progress Note      Angélica Gonzalez is a 38 y.o. female patient.  No diagnosis found.    SUBJECTIVE:  no GI complaints.  Having BMs with lactulose. No gross GI bleeding.  No abdominal pain.        Physical    VITALS:  BP (!) 101/59   Pulse 83   Temp 98 °F (36.7 °C)   Resp 17   Ht 1.651 m (5' 5\")   Wt 82.6 kg (182 lb)   SpO2 96%   BMI 30.29 kg/m²   TEMPERATURE:  Current - Temp: 98 °F (36.7 °C); Max - Temp  Av.4 °F (36.9 °C)  Min: 97.9 °F (36.6 °C)  Max: 99.4 °F (37.4 °C)    NAD  jaundiced  Regular rate  Abdomen mild distention but not tense, nontender, Bowel sounds present  Alert and oriented, no asterixis    Data    Data Review:    Recent Labs     24  1637 24   WBC 10.4  --  9.7 9.4   HGB 7.7* 8.2* 8.0* 7.8*   HCT 21.1* 23.0* 22.5* 21.7*   MCV 98.4  --  99.4 100.2*   PLT 79*  --  89* 85*       Recent Labs     24  1046   * 124* 126*   K 4.4 3.7 3.6   CL 89* 91* 92*   CO2    PHOS  --   --  2.7   BUN 13 11 31*   CREATININE <0.5* <0.5* <0.5*       Recent Labs     24   AST 48* 55* 63*   ALT 21 19 23   BILITOT 14.4* 14.6* 14.3*   ALKPHOS 158* 199* 214*       No results for input(s): \"LIPASE\", \"AMYLASE\" in the last 72 hours.  Recent Labs     24  0852   PROTIME 31.0*   INR 3.01*       No results for input(s): \"PTT\" in the last 72 hours.           ASSESSMENT / PLAN      Hepatic encephalopathy -  was not taking lactulose and xifaxan at home due to cost. HE likely due to not taking meds.   - lactulose TID and titrate for goal of 3 BMs daily.     Cirrhosis - due to alcohol abuse. No longer drinking. Has established with Dr Alexander at  liver transplant. Needs 12 weeks of substance use counseling prior to being considered for liver transplant. MELD-Na 26.   - follow up with Dr Alexander    Ascites - diuretic resistant. s/p paracentesis  with 
      Admit Date: 1/27/2024  Diet: ADULT DIET; Regular; Low Sodium (2 gm)    CC: Hepatic encephalopathy    Interval history:   Overnight, there were no acute events. Patient's vitals remained stable    Patient was seen this morning in bed.  Patient was awake eating some ice cream.  She endorsed that she feels much better.  She is happy with the progress she is making.  She did not have any new complaints. Patient denies fevers, chills, nausea, vomiting, chest pain, shortness of breath, diarrhea, constipation, dysuria, urinary frequency or urgency.     Plan:     -Continue lactulose (titrate to 3-4 soft BMs per day) + rifaximin  -Monitor Na  -Continue IV Protonix  -Infectious workup negative thus far  -Will await further recommendations regarding hyponatremia and diuretic management from nephrology     Assessment:   Angélica Gonzalez is a 38 y.o. female with PMH of alcoholic liver cirrhosis requiring paracentesis biweekly who was admitted with hepatic encephalopathy     Hepatic encephalopathy  Patient gradually started becoming more confused at home.  She did endorse some abdominal pain.  Confusion progressively got worse.  In the outside ED, patient was very confused and combative, she required Haldol.  Patient's ammonia was >200.  -GI consulted, appreciate recs     Alcoholic liver cirrhosis  Patient was recently seen by the liver transplantation team at .  Patient has been abstinent from alcohol. Per GI, patient saw, \"Dr Marin Alexander at  transplant who increased midodrine to 10 mg tid for hypotension, dc'd carvedilol, rec cont xifaxan (no enceph at ov, no mention lactulose in note) and set f/u march 2023 at which time off etoh and completed rehab for 12 weeks to then be considered for transplant listing.\"  -GI consulted, appreciate recs     Ascites  Patient gets by with weekly paracentesis.  On presentation, she does have a lot of ascites.  Interventional radiology took her to the endoscopy suite and drained 
  [facility] - Inpatient Rehabilitation Department   Phone: [phone]    Physical Therapy    [x] Initial Evaluation            [] Daily Treatment Note         [x] Discharge Summary      Patient: Angélica Gonzalez   : 1985   MRN: [id number]   Date of Service:  2024  Admitting Diagnosis: Hepatic encephalopathy (HCC)  Current Admission Summary: Per H&P on  \"38-year-old female with past medical history of alcoholic liver cirrhosis requiring paracentesis biweekly who presented to outside hospital with worsening confusion. After speaking with the patient's mother she endorses that the patient gradually started to become more confused the past couple days. She did complain of abdominal pain. The pain did not improve. Patient did not have any nausea/vomiting that she is aware of. Throughout the week, the pain gradually got worse and patient's mother told her to go to the ED for further evaluation. Per ED staff, patient was combative and was given a dose of Haldol. When patient was examined, she was not able to awaken to commands likely due to effects of Haldol.\"   Pt gets weekly/biweekly thoracentesis at baseline. Currently in outpatient rehab for Etoh abuse. Pt with recent hospital admission.     - paracentesis    - paracentesis  Past Medical History:  has a past medical history of ADHD, Cirrhosis (HCC), Dyslexia, ETOH abuse, Hepatic cirrhosis (HCC), and History of blood transfusion.  Past Surgical History:  has a past surgical history that includes Paracentesis (10/14/2023) and Upper gastrointestinal endoscopy (N/A, 11/15/2023).  Discharge Recommendations: Angélica Gonzalez scored a 24/24 on the AM-PAC short mobility form.  At this time, no further PT is recommended upon discharge due to pt functioning near her baseline.  Recommend patient returns to prior setting with prior services.      DME Required For Discharge: patient has all required DME for 
 followed up with pt, pt unavailable at time of visit. No further needs at this time.  
1200ml of fluid removed  Spoke to patients RN  and advised her the amount of fluid removed and that patient was returning to the floor.  
1800ml of fluid removed  Spoke to patients RN and advised her the amount of fluid removed and that patient was returning to the floor.  
Attempted to reach mother letha regarding update; left voicemail with call back number. Will attempt again later.  
CLINICAL PHARMACY NOTE: MEDS TO BEDS    Total # of Prescriptions Filled: 3   The following medications were delivered to the patient:  POTASSIUM CHLORIDE ER 10MEQ TBCR  SODIUM CHLORIDE 1GM TABS  ENULOSE 10GM/15ML SOLN    Additional Documentation: Patient picked up=signed  Judy Landa Pharmacy Tech  
Called pt's mother to give update on pt's current condition and plan of care.   
Informed MD of hematocrit level this am. No s/s of bleeding noted this shift.  
Meds taken and given to pharmacy. Locked up in pharmacy.   
[facility]  Progress Note    Reason for Consult:  Chronic Hyponatremia  Requesting Physician:  Dr LAURI Claudio    CHIEF COMPLAINT:  Mental status change        More awake and alert.  Complaining of worsening abdominal distention and ascites.  - Going for paracentesis today.    Medications reviewed.  Lasix 20 mg twice daily.  IV albumin 25 g every 8.  ProAmatine 5 mg 3 times a day.  Urea NA 15 g daily.            HISTORY OF PRESENT ILLNESS:               Angélica Gonzalze  is 38 y.o., female with significant past medical history of Alcoholic Liver Cirrhosis, Hyponatremia, best Na around 128-129, ADHD,  who presents due mental status change.  Was recently admitted due to hyponatremia and was discharged on urea 15 g twice daily and Lasix 20 mg p.o. twice daily.  Na currently 128.  Lowest sbp in the 90's range.   No H/O Seizures.  No regular NSAID use.  Has nausea.  Last alcohol drink around September 2023.  Off urea, her serum sodium decreases.  Ammonia level today at 86 which is about the same as January 22, 2024 which was 85.    Past Medical History:     has a past medical history of ADHD, Cirrhosis (HCC), Dyslexia, ETOH abuse, Hepatic cirrhosis (HCC), and History of blood transfusion.   Past Surgical History:     has a past surgical history that includes Paracentesis (10/14/2023) and Upper gastrointestinal endoscopy (N/A, 11/15/2023).   Current Medications:    Current Facility-Administered Medications: 0.9 % sodium chloride infusion, , IntraVENous, PRN  urea (URE-NA) packet 15 g, 15 g, Oral, Daily  albumin human 25% IV solution 25 g, 25 g, IntraVENous, Q8H  furosemide (LASIX) tablet 20 mg, 20 mg, Oral, BID  traMADol (ULTRAM) tablet 50 mg, 50 mg, Oral, Q6H PRN  pantoprazole (PROTONIX) injection 40 mg, 40 mg, IntraVENous, BID  ferrous sulfate (IRON 325) tablet 325 mg, 325 mg, Oral, Daily with breakfast  folic acid (FOLVITE) tablet 1 mg, 1 mg, Oral, Daily  midodrine (PROAMATINE) tablet 5 
[facility]  Progress Note    Reason for Consult:  Chronic Hyponatremia  Requesting Physician:  Dr LAURI Claudio    CHIEF COMPLAINT:  Mental status change      Patient is fully awake and alert.    Patient is waiting for the sodium levels to touch at least 125 before she wants to be discharged.    Had paracentesis yesterday approximately 1.5 L removed.  -  Sodium remains refractory at 121.  - Etiology of severe hyponatremia in a patient with advanced decompensated chronic liver disease is multifactorial.  - Hyponatremia in patient with decompensated chronic liver disease points to poor prognostic.    - Begin Samsca 15 g one-time dose.  - Continue urea NA and increase dose to 30 g.  - Begin salt tablet 1 g twice daily.  - Continue Lasix 20 mg daily.    Continue best efforts at fluid restriction.    Electrolytes have improved.    Anemia m-7.8-continue to monitor.    Total bilirubin mild improvement to 14.3.  - Was 18.2 on admission.      High complexity.    Discussed with treatment team.  Discussed with nursing.  Patient updated.          HISTORY OF PRESENT ILLNESS:               Angélica Gonzalez  is 38 y.o., female with significant past medical history of Alcoholic Liver Cirrhosis, Hyponatremia, best Na around 128-129, ADHD,  who presents due mental status change.  Was recently admitted due to hyponatremia and was discharged on urea 15 g twice daily and Lasix 20 mg p.o. twice daily.  Na currently 128.  Lowest sbp in the 90's range.   No H/O Seizures.  No regular NSAID use.  Has nausea.  Last alcohol drink around September 2023.  Off urea, her serum sodium decreases.  Ammonia level today at 86 which is about the same as January 22, 2024 which was 85.    Past Medical History:     has a past medical history of ADHD, Cirrhosis (HCC), Dyslexia, ETOH abuse, Hepatic cirrhosis (HCC), and History of blood transfusion.   Past Surgical History:     has a past surgical history that includes 
[facility]  Gastroenterology Progress Note    Angélica Gonzalez is a 38 y.o. female patient.  No diagnosis found.    SUBJECTIVE:  alert, conversant, tolerating clear liquid breakfast this morning.     ROS:  Cardiovascular ROS: no chest pain  Gastrointestinal ROS: no abd pain  Respiratory ROS: no sob    Physical    VITALS:  BP (!) 94/57   Pulse 87   Temp 98 °F (36.7 °C) (Oral)   Resp 17   Ht 1.651 m (5' 5\")   Wt 82 kg (180 lb 12.4 oz)   SpO2 97%   BMI 30.08 kg/m²   TEMPERATURE:  Current - Temp: 98 °F (36.7 °C); Max - Temp  Av.9 °F (36.6 °C)  Min: 97.4 °F (36.3 °C)  Max: 98.5 °F (36.9 °C)    NAD  jaundiced  Regular rate  Lungs CTA Bilaterally  Abdomen soft, less distended s/p para yesterday, nontender, Bowel sounds present  Alert and oriented, mild asterixis    Data    CBC:   Lab Results   Component Value Date/Time    WBC 11.7 2024 05:06 AM    RBC 2.05 2024 05:06 AM    HGB 7.4 2024 05:06 AM    HCT 20.6 2024 05:06 AM    .7 2024 05:06 AM    MCH 35.9 2024 05:06 AM    MCHC 35.7 2024 05:06 AM    RDW 26.2 2024 05:06 AM    PLT 89 2024 05:06 AM    MPV 6.2 2024 05:06 AM     Hepatic Function Panel:    Lab Results   Component Value Date/Time    ALKPHOS 145 2024 05:06 AM    ALT 22 2024 05:06 AM    AST 65 2024 05:06 AM    PROT 5.1 2024 05:06 AM    BILITOT 18.2 2024 07:53 AM    BILIDIR 6.0 2024 07:53 AM    IBILI 12.2 2024 07:53 AM           ASSESSMENT / PLAN :    Neuro: suspect some component hepatic enceph with nh3- 271 at outside hospital but was awake/ conversant there per my d/w ER; upon [facility] transfer somnolent with acute change more likely from Haldol at [facility].  No reported gi bleed and hbg at [facility] stable from recent dc here; paracentesis 24 neg for neutrocytic ascities, cxr atelectasis vs infiltrate. Received lactulose enemas overnight.  24 now alert, conversant, answers questions appropriately, mild 
Paracentesis cell count neg for neutrocytic ascites, can hold off antbx from ascites standpoint.  
Physical/Occupational Therapy Attempt  Angélica Gonzalez    PT/OT attempted evaluation however pt requests therapy come back after 9:30 am as she has a phone therapy session for her Etoh rehab at 9:10. Pt educated on therapy schedule and potential inability to return this date. Pt verbalizes understanding and is agreeable to follow up today/tomorrow. Will follow-up as pt status and schedule allow.   No charge.  Reba Tanner, PT, DPT #[id number]  JEANMARIE Hopper.Ed, OTR/L [id number]    
Pt more alert this shift. Pt requested water. Swallow study completed and pt passed. MD made aware and changed pt to clear liquid diet. Pt tolerating liquids well, no cough noted. HOB elevated 90 degrees while drinking.   
Reported positive occult stool to [address]. No new orders pt is already being followed by GI and sm varices noted in EGD.  
This RN spoke with Dr Cheema office, pt to have urine and lab studies done prior to appt on Tuesday. Office staff placed orders in EPIC to be done while inpatient however unable to collect these while in patient. Duplicate orders placed so that lab can view and collect.   
   {ffptproprioception:48272}  Tone:   {ffpttone:98123}  Coordination Testing:   {ffptcoordination:18260}    ROM:   {FFPTROM:34987}  Strength:   {FFPTStrength:44181}  Therapist Clinical Decision Making (Complexity): {ffptdecisionmakin}  Clinical Presentation: {ffptclinicalpresentation:17444}      Subjective  General: ***  Pain: {ffptpain:36836}  Pain Interventions: {ffptpaininterventions:44855}       Functional Mobility  Bed Mobility:  {ffptbedmobility:92560}  Comments:  Transfers:  {FFPTTRANSFERS:23051:p}  Comments:  Ambulation:  {ffptambulationlist:37215}  Distance: ***  Gait Mechanics: ***  Comments:    Stair Mobility:  {ffptstairlist:54190}  Comments:  Wheelchair Mobility:  {ffptw/clist:04034}  Comments:  Balance:  {ffptbalancelist:49298}  Comments:    Other Therapeutic Interventions    Functional Outcomes                 Cognition  {ffptcognition:83572}  Orientation:    {ffptorientation:36035}  Command Following:   {ffptcommandfollowin}    Education  Barriers To Learning: {ffptlearning barriers:55730}  Patient Education: patient educated on {ffpteducation:61130}  Learning Assessment:  {ffptlearnin}    Assessment  Activity Tolerance: ***  Impairments Requiring Therapeutic Intervention: {ffptimpairments:70472}  Prognosis: {ffptprognosis:79927}  Clinical Assessment: ***  Safety Interventions: {ffptsafety:39514}    Plan  Frequency: {ffptfrequency:98980}  Current Treatment Recommendations: {ffpttreatmentrecommendations:24321}    Goals  Patient Goals: ***   Short Term Goals:  Time Frame: ***  {ffptgoals:41875}    Above goals reviewed on 2024.  All goals are ongoing at this time unless indicated above.      Therapy Session Time      Individual Group Co-treatment   Time In         Time Out         Minutes           Timed Code Treatment Minutes:     Total Treatment Minutes:         Electronically Signed By: JENNIFER WARD SPT          
recs      Diet ADULT DIET; Regular; 1800 ml   DVT Prophylaxis [] Lovenox, []  Heparin, [x] SCDs, [] Ambulation,  [] Eliquis, [] Xarelto  [] Coumadin   Code Status Full Code   Disposition Estimated Date of Discharge: 2 days  Patient requires continued admission due to hepatic encephalopathy, anemia, thrombocytopenia.   Surrogate Decision Maker/ POA       Personally reviewed Lab Studies and Imaging       Subjective:     Chief Complaint: Altered mental status    Angélica Gonzalez is a 38 y.o. female who presents with altered mental status.    Patient was seen and examined this morning.  No acute event overnight.  Patient denies having chest pain, shortness of breath, nausea, vomiting, abdominal pain, constipation, diarrhea.  Reports that her confusion is improving.  Patient also reports abdominal distention.      Review of Systems:      Pertinent positives and negatives discussed in HPI    Objective:     Intake/Output Summary (Last 24 hours) at 1/30/2024 1514  Last data filed at 1/30/2024 1358  Gross per 24 hour   Intake 1100 ml   Output --   Net 1100 ml      Vitals:   Vitals:    01/30/24 0400 01/30/24 0708 01/30/24 0745 01/30/24 1115   BP: (!) 109/49  113/60 108/63   Pulse: 89  90 84   Resp: 16 16 16 16   Temp: 97.8 °F (36.6 °C)  97.8 °F (36.6 °C) 98.1 °F (36.7 °C)   TempSrc: Temporal  Oral Oral   SpO2: 94% 98% 98% 99%   Weight:       Height:             Physical Exam:    General: NAD  Eyes: EOMI  ENT: neck supple  Cardiovascular: Regular rate.  Respiratory: Clear to auscultation  Gastrointestinal: abdominal distention   Genitourinary: no suprapubic tenderness  Musculoskeletal: No edema  Skin: warm, dry  Neuro: Alert.  Psych: Mood appropriate.         Medications:   Medications:    urea  15 g Oral Daily    albumin human 25%  25 g IntraVENous Q8H    furosemide  20 mg Oral BID    pantoprazole  40 mg IntraVENous BID    ferrous sulfate  325 mg Oral Daily with breakfast    folic acid  1 mg Oral Daily    midodrine  5 mg 
01/29/24  0442 01/28/24  0753 01/28/24  0506 01/27/24  1810 01/22/24  0428 01/18/24  0517 01/17/24  1526 01/12/24  0450 01/06/24  1553   PROT 5.1* 4.9*  --  5.1* 5.6* 5.7*   < >  --    < >  --    INR  --   --  2.99*  --   --   --   --  2.76*  --  2.75*    < > = values in this interval not displayed.     PTT:  No results for input(s): \"APTT\" in the last 720 hours.    CMP:    Recent Labs     01/30/24  0434 01/29/24 0442 01/28/24  0753 01/28/24  0506 01/27/24  1810   * 120*  --  128* 129*   K 4.3 3.8  --  4.2 4.1   CL 90* 89*  --  93* 95*   CO2 24 21  --  21 20*   GLUCOSE 115* 175*  --  114* 121*   BUN 16 13  --  25* 26*   CREATININE <0.5* <0.5*  --  <0.5* <0.5*   LABGLOM >60 >60  --  >60 >60   CALCIUM 8.2* 7.9*  --  9.1 9.0   PROT 5.1* 4.9*  --  5.1* 5.6*   LABALBU 3.5 3.2*  --  3.6 3.7   AGRATIO 2.2 1.9  --  2.4* 1.9   BILITOT 14.4* 14.9* 18.2* 17.9* 17.3*   ALKPHOS 158* 147*  --  145* 198*   ALT 21 21  --  22 24   AST 48* 53*  --  65* 59*   MG 2.00 1.70*  --  2.10 2.10       Lab Results   Component Value Date    CALCIUM 8.2 (L) 01/30/2024    PHOS 4.2 01/17/2024       LDH:  Recent Labs     01/28/24  0606   *       Radiology Review:  IR US GUIDED PARACENTESIS  Narrative: PROCEDURE:  PARACENTESIS WITHOUT IMAGE GUIDANCE    US ABDOMEN LIMITED    1/27/2024    HISTORY:  ORDERING SYSTEM PROVIDED HISTORY: hepatic encephalopathy and ammonia > 200  and history ascities/frequent paracentesis.  TECHNOLOGIST PROVIDED HISTORY:  Reason for exam:->hepatic encephalopathy and ammonia > 200 and history  ascities/frequent paracentesis.    TECHNIQUE:  Informed consent was obtained after a detailed explanation of the procedure  including risks, benefits, and alternatives.  Universal protocol was  followed.  A limited ultrasound of the abdomen was performed. The right  abdomen was prepped and draped in sterile fashion and local anesthesia was  achieved with lidocaine.  A 5 Scottish needle sheath was advanced into ascites  and 
Effort: Pulmonary effort is normal. No respiratory distress.      Breath sounds: Normal breath sounds. No wheezing or rales.   Abdominal:      General: Bowel sounds are normal. There is distension.      Palpations: Abdomen is soft.   Musculoskeletal:         General: Normal range of motion.      Right lower leg: No edema.      Left lower leg: No edema.   Neurological:      Mental Status: She is alert and oriented to person, place, and time.   Psychiatric:         Behavior: Behavior normal.       Review of Systems  - Negative except Interval History    LABS:    CBC:   Recent Labs     01/27/24  1810 01/28/24  0506 01/28/24  1805 01/29/24  0442   WBC 9.5 11.7*  --  12.3*   HGB 8.1* 7.4* 7.4* 7.0*   HCT 22.3* 20.7*  20.6* 20.9* 19.5*   PLT 89* 89*  --  86*   .8* 100.7*  --  101.7*       Renal:    Recent Labs     01/27/24  1810 01/28/24  0506 01/29/24  0442   * 128* 120*   K 4.1 4.2 3.8   CL 95* 93* 89*   CO2 20* 21 21   BUN 26* 25* 13   CREATININE <0.5* <0.5* <0.5*   GLUCOSE 121* 114* 175*   CALCIUM 9.0 9.1 7.9*   MG 2.10 2.10 1.70*   ANIONGAP 14 14 10       Hepatic:   Recent Labs     01/27/24  1810 01/28/24  0506 01/28/24  0753 01/29/24  0442   AST 59* 65*  --  53*   ALT 24 22  --  21   BILITOT 17.3* 17.9* 18.2* 14.9*   BILIDIR  --   --  6.0*  --    PROT 5.6* 5.1*  --  4.9*   LABALBU 3.7 3.6  --  3.2*   ALKPHOS 198* 145*  --  147*       Troponin: No results for input(s): \"TROPONINI\" in the last 72 hours.  BNP: No results for input(s): \"BNP\" in the last 72 hours.  Lipids: No results for input(s): \"CHOL\", \"HDL\" in the last 72 hours.    Invalid input(s): \"LDLCALCU\", \"TRIGLYCERIDE\"  ABGs:    Recent Labs     01/27/24  1045   PHART 7.526*   KIC8XRL 29.5*   PO2ART 86.2   AJE9WMT 24.4   BEART 1.8   O2GMDQBT 99.1   FFF3OXP 56.8         INR:   Recent Labs     01/28/24  0753   INR 2.99*       Lactate: No results for input(s): \"LACTATE\" in the last 72 
Supple, symmetrical, trachea midline, no adenopathy, thyroid symmetric, not enlarged and no tenderness, skin normal  HEMATOLOGIC/LYMPHATICS:  no cervical lymphadenopathy  LUNGS:  No increased work of breathing, good air exchange, clear to auscultation bilaterally, no crackles or wheezing  CARDIOVASCULAR:  Normal apical impulse, regular rate and rhythm, normal S1 and S2  ABDOMEN:  Normal bowel sounds, soft, distended, non-tender, no masses palpated, no hepatosplenomegaly  MUSCULOSKELETAL:  There is no redness, warmth, or swelling of the joints.  Trace edema  NEUROLOGIC:  Awake, alert, oriented to name, place and time.    SKIN:  no bruising or bleeding    DATA:    CBC:   Lab Results   Component Value Date/Time    WBC 12.3 01/29/2024 04:42 AM    RBC 1.92 01/29/2024 04:42 AM    HGB 7.0 01/29/2024 04:42 AM    HCT 19.5 01/29/2024 04:42 AM    .7 01/29/2024 04:42 AM    MCH 36.3 01/29/2024 04:42 AM    MCHC 35.7 01/29/2024 04:42 AM    RDW 25.9 01/29/2024 04:42 AM    PLT 86 01/29/2024 04:42 AM    MPV 6.8 01/29/2024 04:42 AM     BMP:   Lab Results   Component Value Date/Time     01/29/2024 04:42 AM    K 3.8 01/29/2024 04:42 AM    CL 89 01/29/2024 04:42 AM    CO2 21 01/29/2024 04:42 AM    BUN 13 01/29/2024 04:42 AM    CREATININE <0.5 01/29/2024 04:42 AM    CALCIUM 7.9 01/29/2024 04:42 AM    LABGLOM >60 01/29/2024 04:42 AM    GLUCOSE 175 01/29/2024 04:42 AM   Magnesium:    Lab Results   Component Value Date/Time    MG 1.70 01/29/2024 04:42 AM   Phosphorus:    Lab Results   Component Value Date/Time    PHOS 4.2 01/17/2024 10:13 AM     Ammonia level 86    IMPRESSION/RECOMMENDATIONS:    1.  Chronic hyponatremia-sodium currently at goal.  Previous urine studies reflecting SIADH.  Appearing euvolemic at this time.  1.5 L per 24-hour fluid restriction.  Lasix 20 mg p.o. twice daily.  Hold spironolactone for now.  Hold urea with mental status change    Severe hyponatremia-appears refractory.  Multiple risk factors.  - 
stable      Subjective  General: Pt in high-mixon's position in bed, pleasant and agreeable to OT/PT eval. Pt reports feeling tired, only getting ~2 hours sleep last night, due to another pt calling out last night.  Pain: 4/10.  Location: liver area, R upper abdomen area where bruise is locaated, around to back (6/10 in bed) , mid chest are  Pain Interventions: not applicable        Activities of Daily Living  Basic Activities of Daily Living  Grooming: Independent in stance at sink to tanisha face, brush teeth, comb and arrange hair  Upper Extremity Dressing: Independent don bathrobe  Lower Extremity Dressing: Independent don/doff socks  Instrumental Activities of Daily Living  No IADL completed on this date.    Functional Mobility  Bed Mobility:  Supine to Sit: modified independent  Sit to Supine: modified independent  Scooting: Independent  Comments:HOB elevate  Transfers:  Sit to stand transfer:Independent  Stand to sit transfer: Independent  Comments:from EOB, to EOB  Functional Mobility  Functional Mobility Activity: to/from bathroom, functional mobilty ~400 ft in stuart  Device Use: no device  Required Assistance: Independent  Comment: No LOB; pt tolerated standing ~15 min  Balance:  Static Sitting Balance: good(+): independent with high level dynamic balance in unsupported position  Dynamic Sitting Balance: good(+): independent with high level dynamic balance in unsupported position  Static Standing Balance: good: independent with functional balance in unsupported position  Dynamic Standing Balance: good: independent with functional balance in unsupported position  Comments:    Other Therapeutic Interventions    Functional Outcomes  AM-PAC Inpatient Daily Activity Raw Score: 24    Cognition  WFL  Comments: Pt somewhat tangential with topics while gathering PLOF information, gets off topic.  Orientation:    alert and oriented x 4  Command Following:   WFL     Education  Barriers To Learning: none  Patient 
p.o. twice daily.  Hold spironolactone for now.  Hold urea with mental status change    Severe hyponatremia-appears refractory.-Repeat sodium is 122  Multiple risk factors.  - Decompensated liver disease.  - Volume overload.  From ascites.  - Patient going for paracentesis and acetic tap.  - Continue Lasix 20 mg twice daily by mouth with careful monitoring of electrolytes  -    Continue IV albumin 25% every 8.  - Continue urea NA 15 g 3 times a day.  - Will hold salt tablet due to high risk for worsening fluid retention.  - Continue Lasix 20 mg daily..  - Hopefully drainage of ascites will help with improving sodium levels    Continue to monitor electrolytes.  - Potassium levels are stable.    High complexity.    Improve hypotension-May need-midodrine 10 mg 3 times a day.    - Continue to monitor urine output.    High complexity.  Medications reviewed and appropriate.      .  2.  Relative hypotension-chronic.  Continue midodrine.  3.  Anemia-follow hemoglobin  4.  History of alcoholic liver cirrhosis-last alcohol use around September 2023.  Will be following up with  Hepatology.   5.  Hypoalbuminemia  6.  History of ascites        Yevgeniy Baldwin MD   
Negative 01/28/2024 06:16 PM     Urine Cultures:   Lab Results   Component Value Date/Time    LABURIN No growth at 18 to 36 hours 01/27/2024 11:18 AM     Blood Cultures:   Lab Results   Component Value Date/Time    BC  01/27/2024 11:09 AM     No Growth to date.  Any change in status will be called.     Lab Results   Component Value Date/Time    BLOODCULT2  01/27/2024 11:09 AM     No Growth to date.  Any change in status will be called.     Organism: No results found for: \"ORG\"      Electronically signed by Bruno Fu MD on 1/31/2024 at 7:35 AM

## 2024-02-01 NOTE — CARE COORDINATION
01/28/24 1511   Readmission Assessment   Number of Days since last admission? 1-7 days   Previous Disposition Home with Family   Who is being Interviewed Patient;Caregiver  (mother)   What was the patient's/caregiver's perception as to why they think they needed to return back to the hospital? Other (Comment)  (increased disorientation they beleive may be because they were not able to get the Lactulose, poss need for prior-auth. family & frien noticed increase in disorientation and jaundice)   Did you visit your Primary Care Physician after you left the hospital, before you returned this time? Yes   Why weren't you able to visit your PCP? Did not have an appointment   Did you see a specialist, such as Cardiac, Pulmonary, Orthopedic Physician, etc. after you left the hospital? No   Who advised the patient to return to the hospital? Self-referral  (mom)   Does the patient report anything that got in the way of taking their medications? Yes   What reasons did they give? Other (Comment)  (unable to secure Lactulose, possible need for prior-auth)   In our efforts to provide the best possible care to you and others like you, can you think of anything that we could have done to help you after you left the hospital the first time, so that you might not have needed to return so soon? Identify patient's health literacy needs;Improved written discharge instructions;Teaching during hospitalization regarding your illness;Discharge instructions that are concise, clear, and non contradictory;Arrange for more help when leaving the hospital       
Discharge Planning Note:    CM received consult for assistance with covering medication cost.  CM able to identify that pt has [facility] Medicaid listed as coverage.  Financial consult placed for review.     Electronically signed by Miryam Elmore RN on 1/28/2024 at 1:39 PM      
Discharge Planning:     (CM) called Kar in Financial [phone] and asked her to come see the Patient for financial assistance in regards to her medications.       Electronically signed by MARYANN Bartlett on 2/1/2024 at 1:11 PM      UPDATE :    Spoke with Kar she advised to have the prescriptions sent down and they will be covered.     CM notified Nurse, Mariza of this.      Electronically signed by MARYANN Bartlett on 2/1/2024 at 1:22 PM    
so, who? (P) Yes  Plans to Return to Present Housing: (P) Yes  Other Identified Issues/Barriers to RETURNING to current housing: medication assistance    Potential Assistance needed at discharge: (P) Prescription Assistance            Potential DME:  n/a  Patient expects to discharge to: (P) House  Plan for transportation at discharge: (P) Family    Financial    Payor: BCBS / Plan: [employer] HMO [employer]QU[employer] / Product Type: *No Product type* /     Does insurance require precert for SNF: Yes    Potential assistance Purchasing Medications: (P) No  Meds-to-Beds request:        [facility] [address] [place] [address] [address] [place] [address] [phone] - F [phone] [address] [facility] [address]  Phone: [phone] Fax: [phone]      Notes:    Factors facilitating achievement of predicted outcomes: Family support, Friend support, Motivated, Cooperative, and Pleasant    Barriers to discharge: Medical complications and Medication managment    Additional Case Management Notes: Pt remains disoriented at times but able to answer questions appropriately minus mixed up dates/names.  CM met with mom and pt bedside with long conversation regarding possible cause to readmission.  Pt and mother report that they were not able to obtain the Lactulose medication from the pharmacy.  Pt unable to fully explain why cost would be so expensive, possible need for prior-auth.  Mom's main concern is that she feels that pt being without the medication for the few days, her mentation began to decline and she ended back in the hospital.  CM agreed to request M2B to ensure pt has medication in hand before leaving the hospital, pt and mother agree with this plan. Financial consult placed to verify insurances.  CM explained to mother and pt that financial assistance with medication is most likely not an option if pt has two active insurances.     The Plan for Transition of Care is related to the following treatment goals of

## 2024-02-01 NOTE — DISCHARGE INSTRUCTIONS
- 1 L of fluid restriction daily.  -Start taking salt tablet 1 g 3 times daily.  -Start taking 10 mEq potassium daily for 10 days.  -Continue taking Lasix 20 mg daily.  -Continue taking rifaximin.  -Follow-up with your gastroenterologist, nephrologist, and PCP.

## 2024-02-01 NOTE — PLAN OF CARE
Problem: Discharge Planning  Goal: Discharge to home or other facility with appropriate resources  Outcome: Completed     Problem: Skin/Tissue Integrity  Goal: Absence of new skin breakdown  Description: 1.  Monitor for areas of redness and/or skin breakdown  2.  Assess vascular access sites hourly  3.  Every 4-6 hours minimum:  Change oxygen saturation probe site  4.  Every 4-6 hours:  If on nasal continuous positive airway pressure, respiratory therapy assess nares and determine need for appliance change or resting period.  Outcome: Completed     Problem: ABCDS Injury Assessment  Goal: Absence of physical injury  Outcome: Completed     Problem: Safety - Adult  Goal: Free from fall injury  Outcome: Completed     Problem: Gastrointestinal - Adult  Goal: Maintains or returns to baseline bowel function  Outcome: Completed     Problem: Neurosensory - Adult  Goal: Achieves stable or improved neurological status  Outcome: Completed     Problem: Hematologic - Adult  Goal: Maintains hematologic stability  Outcome: Completed  Flowsheets (Taken 2/1/2024 0000 by Antionette Beal RN)  Maintains hematologic stability: Assess for signs and symptoms of bleeding or hemorrhage     Problem: Skin/Tissue Integrity - Adult  Goal: Skin integrity remains intact  Outcome: Completed     Problem: Pain  Goal: Verbalizes/displays adequate comfort level or baseline comfort level  Outcome: Completed

## 2024-02-02 LAB
BACTERIA FLD AEROBE CULT: NORMAL
GRAM STN SPEC: NORMAL

## 2024-02-04 ENCOUNTER — HOSPITAL ENCOUNTER (INPATIENT)
Age: 39
LOS: 7 days | Discharge: HOME | DRG: 433 | End: 2024-02-11
Attending: EMERGENCY MEDICINE | Admitting: HOSPITALIST
Payer: COMMERCIAL

## 2024-02-04 ENCOUNTER — APPOINTMENT (OUTPATIENT)
Dept: GENERAL RADIOLOGY | Age: 39
DRG: 433 | End: 2024-02-04
Payer: COMMERCIAL

## 2024-02-04 DIAGNOSIS — R42 DIZZINESS: ICD-10-CM

## 2024-02-04 DIAGNOSIS — K72.00 ACUTE LIVER FAILURE WITHOUT HEPATIC COMA: ICD-10-CM

## 2024-02-04 DIAGNOSIS — E87.1 HYPONATREMIA: Primary | ICD-10-CM

## 2024-02-04 DIAGNOSIS — E80.6 HYPERBILIRUBINEMIA: ICD-10-CM

## 2024-02-04 LAB
ACANTHOCYTES BLD QL SMEAR: ABNORMAL
ALBUMIN SERPL-MCNC: 4 G/DL (ref 3.4–5)
ALBUMIN/GLOB SERPL: 2.4 {RATIO} (ref 1.1–2.2)
ALP SERPL-CCNC: 280 U/L (ref 40–129)
ALT SERPL-CCNC: 40 U/L (ref 10–40)
AMMONIA PLAS-SCNC: 39 UMOL/L (ref 11–51)
ANION GAP SERPL CALCULATED.3IONS-SCNC: 11 MMOL/L (ref 3–16)
ANISOCYTOSIS BLD QL SMEAR: ABNORMAL
AST SERPL-CCNC: 93 U/L (ref 15–37)
BACTERIA URNS QL MICRO: ABNORMAL /HPF
BASOPHILS # BLD: 0.1 K/UL (ref 0–0.2)
BASOPHILS NFR BLD: 1 %
BILIRUB SERPL-MCNC: 18.4 MG/DL (ref 0–1)
BILIRUB UR QL STRIP.AUTO: ABNORMAL
BUN SERPL-MCNC: 11 MG/DL (ref 7–20)
BURR CELLS BLD QL SMEAR: ABNORMAL
CALCIUM SERPL-MCNC: 8.8 MG/DL (ref 8.3–10.6)
CHLORIDE SERPL-SCNC: 86 MMOL/L (ref 99–110)
CLARITY UR: CLEAR
CO2 SERPL-SCNC: 21 MMOL/L (ref 21–32)
COLOR UR: ABNORMAL
CREAT SERPL-MCNC: <0.5 MG/DL (ref 0.6–1.1)
EOSINOPHIL # BLD: 0.1 K/UL (ref 0–0.6)
EOSINOPHIL NFR BLD: 1 %
EPI CELLS #/AREA URNS AUTO: 7 /HPF (ref 0–5)
GFR SERPLBLD CREATININE-BSD FMLA CKD-EPI: >60 ML/MIN/{1.73_M2}
GLUCOSE SERPL-MCNC: 94 MG/DL (ref 70–99)
GLUCOSE UR STRIP.AUTO-MCNC: NEGATIVE MG/DL
HCG SERPL QL: NEGATIVE
HCT VFR BLD AUTO: 22.7 % (ref 36–48)
HGB BLD-MCNC: 7.8 G/DL (ref 12–16)
HGB UR QL STRIP.AUTO: NEGATIVE
HYALINE CASTS #/AREA URNS AUTO: 0 /LPF (ref 0–8)
HYPOCHROMIA BLD QL SMEAR: ABNORMAL
INR PPP: 2.98 (ref 0.84–1.16)
KETONES UR STRIP.AUTO-MCNC: NEGATIVE MG/DL
LEUKOCYTE ESTERASE UR QL STRIP.AUTO: ABNORMAL
LIPASE SERPL-CCNC: 270 U/L (ref 13–60)
LYMPHOCYTES # BLD: 0.8 K/UL (ref 1–5.1)
LYMPHOCYTES NFR BLD: 3 %
MCH RBC QN AUTO: 35.3 PG (ref 26–34)
MCHC RBC AUTO-ENTMCNC: 34.5 G/DL (ref 31–36)
MCV RBC AUTO: 102.4 FL (ref 80–100)
MONOCYTES # BLD: 0.5 K/UL (ref 0–1.3)
MONOCYTES NFR BLD: 4 %
NEUTROPHILS # BLD: 10.1 K/UL (ref 1.7–7.7)
NEUTROPHILS NFR BLD: 87 %
NITRITE UR QL STRIP.AUTO: NEGATIVE
PH UR STRIP.AUTO: 5.5 [PH] (ref 5–8)
PLATELET # BLD AUTO: 94 K/UL (ref 135–450)
PLATELET BLD QL SMEAR: ABNORMAL
PMV BLD AUTO: 7 FL (ref 5–10.5)
POLYCHROMASIA BLD QL SMEAR: ABNORMAL
POTASSIUM SERPL-SCNC: 4.9 MMOL/L (ref 3.5–5.1)
PROT SERPL-MCNC: 5.7 G/DL (ref 6.4–8.2)
PROT UR STRIP.AUTO-MCNC: NEGATIVE MG/DL
PROTHROMBIN TIME: 30.8 SEC (ref 11.5–14.8)
RBC # BLD AUTO: 2.22 M/UL (ref 4–5.2)
RBC CLUMPS #/AREA URNS AUTO: 5 /HPF (ref 0–4)
SCHISTOCYTES BLD QL SMEAR: ABNORMAL
SLIDE REVIEW: ABNORMAL
SODIUM SERPL-SCNC: 118 MMOL/L (ref 136–145)
SP GR UR STRIP.AUTO: 1.02 (ref 1–1.03)
TOXIC GRANULES BLD QL SMEAR: PRESENT
TROPONIN, HIGH SENSITIVITY: 9 NG/L (ref 0–14)
UA COMPLETE W REFLEX CULTURE PNL UR: YES
UA DIPSTICK W REFLEX MICRO PNL UR: YES
URN SPEC COLLECT METH UR: ABNORMAL
UROBILINOGEN UR STRIP-ACNC: 1 E.U./DL
VARIANT LYMPHS NFR BLD MANUAL: 4 % (ref 0–6)
WBC # BLD AUTO: 11.6 K/UL (ref 4–11)
WBC #/AREA URNS AUTO: 32 /HPF (ref 0–5)

## 2024-02-04 PROCEDURE — 83690 ASSAY OF LIPASE: CPT

## 2024-02-04 PROCEDURE — 84703 CHORIONIC GONADOTROPIN ASSAY: CPT

## 2024-02-04 PROCEDURE — 82140 ASSAY OF AMMONIA: CPT

## 2024-02-04 PROCEDURE — 2060000000 HC ICU INTERMEDIATE R&B

## 2024-02-04 PROCEDURE — 81001 URINALYSIS AUTO W/SCOPE: CPT

## 2024-02-04 PROCEDURE — 85025 COMPLETE CBC W/AUTO DIFF WBC: CPT

## 2024-02-04 PROCEDURE — 99285 EMERGENCY DEPT VISIT HI MDM: CPT

## 2024-02-04 PROCEDURE — 85610 PROTHROMBIN TIME: CPT

## 2024-02-04 PROCEDURE — 93005 ELECTROCARDIOGRAM TRACING: CPT | Performed by: NURSE PRACTITIONER

## 2024-02-04 PROCEDURE — 74018 RADEX ABDOMEN 1 VIEW: CPT

## 2024-02-04 PROCEDURE — 84484 ASSAY OF TROPONIN QUANT: CPT

## 2024-02-04 PROCEDURE — 80053 COMPREHEN METABOLIC PANEL: CPT

## 2024-02-04 RX ORDER — ENOXAPARIN SODIUM 100 MG/ML
40 INJECTION SUBCUTANEOUS DAILY
Status: CANCELLED | OUTPATIENT
Start: 2024-02-05

## 2024-02-05 ENCOUNTER — APPOINTMENT (OUTPATIENT)
Dept: INTERVENTIONAL RADIOLOGY/VASCULAR | Age: 39
DRG: 433 | End: 2024-02-05
Payer: COMMERCIAL

## 2024-02-05 LAB
ALBUMIN SERPL-MCNC: 3.7 G/DL (ref 3.4–5)
ALP SERPL-CCNC: 227 U/L (ref 40–129)
ALT SERPL-CCNC: 38 U/L (ref 10–40)
ANION GAP SERPL CALCULATED.3IONS-SCNC: 9 MMOL/L (ref 3–16)
ANISOCYTOSIS BLD QL SMEAR: ABNORMAL
APPEARANCE FLUID: NORMAL
AST SERPL-CCNC: 100 U/L (ref 15–37)
BACTERIA UR CULT: NORMAL
BASOPHILS # BLD: 0 K/UL (ref 0–0.2)
BASOPHILS NFR BLD: 0 %
BDY FLUID QUALITY: NORMAL
BILIRUB DIRECT SERPL-MCNC: 6.8 MG/DL (ref 0–0.3)
BILIRUB INDIRECT SERPL-MCNC: 10.8 MG/DL (ref 0–1)
BILIRUB SERPL-MCNC: 17.6 MG/DL (ref 0–1)
BUN SERPL-MCNC: 12 MG/DL (ref 7–20)
BURR CELLS BLD QL SMEAR: ABNORMAL
CALCIUM SERPL-MCNC: 8.6 MG/DL (ref 8.3–10.6)
CELL COUNT FLUID TYPE: NORMAL
CHLORIDE SERPL-SCNC: 87 MMOL/L (ref 99–110)
CHLORIDE UR-SCNC: <20 MMOL/L
CO2 SERPL-SCNC: 22 MMOL/L (ref 21–32)
COLOR FLUID: YELLOW
CREAT SERPL-MCNC: <0.5 MG/DL (ref 0.6–1.1)
CREAT UR-MCNC: 150.1 MG/DL (ref 28–259)
DEPRECATED RDW RBC AUTO: 24.3 % (ref 12.4–15.4)
EKG ATRIAL RATE: 103 BPM
EKG DIAGNOSIS: NORMAL
EKG P AXIS: 44 DEGREES
EKG P-R INTERVAL: 150 MS
EKG Q-T INTERVAL: 362 MS
EKG QRS DURATION: 86 MS
EKG QTC CALCULATION (BAZETT): 474 MS
EKG R AXIS: 56 DEGREES
EKG T AXIS: 37 DEGREES
EKG VENTRICULAR RATE: 103 BPM
EOSINOPHIL # BLD: 0.2 K/UL (ref 0–0.6)
EOSINOPHIL NFR BLD: 2 %
GFR SERPLBLD CREATININE-BSD FMLA CKD-EPI: >60 ML/MIN/{1.73_M2}
GLUCOSE BLD-MCNC: 106 MG/DL (ref 70–99)
GLUCOSE SERPL-MCNC: 83 MG/DL (ref 70–99)
HCT VFR BLD AUTO: 21.2 % (ref 36–48)
HGB BLD-MCNC: 7.4 G/DL (ref 12–16)
LIPASE SERPL-CCNC: 255 U/L (ref 13–60)
LYMPHOCYTES # BLD: 1.2 K/UL (ref 1–5.1)
LYMPHOCYTES NFR BLD: 10 %
LYMPHOCYTES NFR FLD: 44 %
MACROPHAGES # FLD: 46 %
MCH RBC QN AUTO: 35.5 PG (ref 26–34)
MCHC RBC AUTO-ENTMCNC: 34.8 G/DL (ref 31–36)
MCV RBC AUTO: 102.2 FL (ref 80–100)
MESOTHL CELL NFR FLD: 1 %
MONOCYTES # BLD: 0.5 K/UL (ref 0–1.3)
MONOCYTES NFR BLD: 4 %
NEUTROPHIL, FLUID: 9 %
NEUTROPHILS # BLD: 9.7 K/UL (ref 1.7–7.7)
NEUTROPHILS NFR BLD: 84 %
NUC CELL # FLD: 341 /CUMM
OSMOLALITY UR: 625 MOSM/KG (ref 390–1070)
PATH REV: NO
PERFORMED ON: ABNORMAL
PLATELET # BLD AUTO: 89 K/UL (ref 135–450)
PLATELET BLD QL SMEAR: ABNORMAL
PMV BLD AUTO: 7 FL (ref 5–10.5)
POLYCHROMASIA BLD QL SMEAR: ABNORMAL
POTASSIUM SERPL-SCNC: 4.9 MMOL/L (ref 3.5–5.1)
POTASSIUM UR-SCNC: 52.9 MMOL/L
PROT SERPL-MCNC: 5.2 G/DL (ref 6.4–8.2)
RBC # BLD AUTO: 2.07 M/UL (ref 4–5.2)
RBC FLUID: NORMAL /CUMM
SLIDE REVIEW: ABNORMAL
SODIUM SERPL-SCNC: 116 MMOL/L (ref 136–145)
SODIUM SERPL-SCNC: 118 MMOL/L (ref 136–145)
SODIUM SERPL-SCNC: 118 MMOL/L (ref 136–145)
SODIUM SERPL-SCNC: 119 MMOL/L (ref 136–145)
SODIUM UR-SCNC: <20 MMOL/L
TOTAL CELLS COUNTED FLD: 100
TOXIC GRANULES BLD QL SMEAR: PRESENT
WBC # BLD AUTO: 11.5 K/UL (ref 4–11)

## 2024-02-05 PROCEDURE — C1729 CATH, DRAINAGE: HCPCS

## 2024-02-05 PROCEDURE — 85025 COMPLETE CBC W/AUTO DIFF WBC: CPT

## 2024-02-05 PROCEDURE — 89051 BODY FLUID CELL COUNT: CPT

## 2024-02-05 PROCEDURE — 36415 COLL VENOUS BLD VENIPUNCTURE: CPT

## 2024-02-05 PROCEDURE — 80076 HEPATIC FUNCTION PANEL: CPT

## 2024-02-05 PROCEDURE — 6370000000 HC RX 637 (ALT 250 FOR IP): Performed by: INTERNAL MEDICINE

## 2024-02-05 PROCEDURE — 87015 SPECIMEN INFECT AGNT CONCNTJ: CPT

## 2024-02-05 PROCEDURE — 2060000000 HC ICU INTERMEDIATE R&B

## 2024-02-05 PROCEDURE — 6370000000 HC RX 637 (ALT 250 FOR IP): Performed by: NURSE PRACTITIONER

## 2024-02-05 PROCEDURE — 82436 ASSAY OF URINE CHLORIDE: CPT

## 2024-02-05 PROCEDURE — 88112 CYTOPATH CELL ENHANCE TECH: CPT

## 2024-02-05 PROCEDURE — P9047 ALBUMIN (HUMAN), 25%, 50ML: HCPCS | Performed by: NURSE PRACTITIONER

## 2024-02-05 PROCEDURE — 80048 BASIC METABOLIC PNL TOTAL CA: CPT

## 2024-02-05 PROCEDURE — 2580000003 HC RX 258: Performed by: NURSE PRACTITIONER

## 2024-02-05 PROCEDURE — 84295 ASSAY OF SERUM SODIUM: CPT

## 2024-02-05 PROCEDURE — 82570 ASSAY OF URINE CREATININE: CPT

## 2024-02-05 PROCEDURE — 6360000002 HC RX W HCPCS: Performed by: NURSE PRACTITIONER

## 2024-02-05 PROCEDURE — 83930 ASSAY OF BLOOD OSMOLALITY: CPT

## 2024-02-05 PROCEDURE — 2500000003 HC RX 250 WO HCPCS: Performed by: INTERNAL MEDICINE

## 2024-02-05 PROCEDURE — 84300 ASSAY OF URINE SODIUM: CPT

## 2024-02-05 PROCEDURE — 87086 URINE CULTURE/COLONY COUNT: CPT

## 2024-02-05 PROCEDURE — 88305 TISSUE EXAM BY PATHOLOGIST: CPT

## 2024-02-05 PROCEDURE — 93010 ELECTROCARDIOGRAM REPORT: CPT | Performed by: INTERNAL MEDICINE

## 2024-02-05 PROCEDURE — 82746 ASSAY OF FOLIC ACID SERUM: CPT

## 2024-02-05 PROCEDURE — 87070 CULTURE OTHR SPECIMN AEROBIC: CPT

## 2024-02-05 PROCEDURE — 83690 ASSAY OF LIPASE: CPT

## 2024-02-05 PROCEDURE — 87205 SMEAR GRAM STAIN: CPT

## 2024-02-05 PROCEDURE — 0W9G3ZZ DRAINAGE OF PERITONEAL CAVITY, PERCUTANEOUS APPROACH: ICD-10-PCS | Performed by: RADIOLOGY

## 2024-02-05 PROCEDURE — 84133 ASSAY OF URINE POTASSIUM: CPT

## 2024-02-05 PROCEDURE — 49083 ABD PARACENTESIS W/IMAGING: CPT

## 2024-02-05 PROCEDURE — 83935 ASSAY OF URINE OSMOLALITY: CPT

## 2024-02-05 PROCEDURE — 82607 VITAMIN B-12: CPT

## 2024-02-05 RX ORDER — ALBUMIN (HUMAN) 12.5 G/50ML
25 SOLUTION INTRAVENOUS ONCE
Status: COMPLETED | OUTPATIENT
Start: 2024-02-05 | End: 2024-02-06

## 2024-02-05 RX ORDER — ONDANSETRON 2 MG/ML
4 INJECTION INTRAMUSCULAR; INTRAVENOUS EVERY 6 HOURS PRN
Status: DISCONTINUED | OUTPATIENT
Start: 2024-02-05 | End: 2024-02-11 | Stop reason: HOSPADM

## 2024-02-05 RX ORDER — SODIUM CHLORIDE 9 MG/ML
INJECTION, SOLUTION INTRAVENOUS PRN
Status: DISCONTINUED | OUTPATIENT
Start: 2024-02-05 | End: 2024-02-11 | Stop reason: HOSPADM

## 2024-02-05 RX ORDER — TRAMADOL HYDROCHLORIDE 50 MG/1
50 TABLET ORAL EVERY 6 HOURS PRN
Status: DISCONTINUED | OUTPATIENT
Start: 2024-02-05 | End: 2024-02-11 | Stop reason: HOSPADM

## 2024-02-05 RX ORDER — POLYETHYLENE GLYCOL 3350 17 G/17G
17 POWDER, FOR SOLUTION ORAL DAILY PRN
Status: DISCONTINUED | OUTPATIENT
Start: 2024-02-05 | End: 2024-02-11 | Stop reason: HOSPADM

## 2024-02-05 RX ORDER — DEXTROAMPHETAMINE SACCHARATE, AMPHETAMINE ASPARTATE MONOHYDRATE, DEXTROAMPHETAMINE SULFATE AND AMPHETAMINE SULFATE 2.5; 2.5; 2.5; 2.5 MG/1; MG/1; MG/1; MG/1
20 CAPSULE, EXTENDED RELEASE ORAL EVERY MORNING
Status: DISCONTINUED | OUTPATIENT
Start: 2024-02-05 | End: 2024-02-11 | Stop reason: HOSPADM

## 2024-02-05 RX ORDER — SPIRONOLACTONE 25 MG/1
25 TABLET ORAL DAILY
Status: DISCONTINUED | OUTPATIENT
Start: 2024-02-05 | End: 2024-02-07

## 2024-02-05 RX ORDER — POTASSIUM CHLORIDE 20 MEQ/1
40 TABLET, EXTENDED RELEASE ORAL PRN
Status: DISCONTINUED | OUTPATIENT
Start: 2024-02-05 | End: 2024-02-11 | Stop reason: HOSPADM

## 2024-02-05 RX ORDER — MIDODRINE HYDROCHLORIDE 5 MG/1
5 TABLET ORAL
Status: DISCONTINUED | OUTPATIENT
Start: 2024-02-05 | End: 2024-02-11 | Stop reason: HOSPADM

## 2024-02-05 RX ORDER — SODIUM CHLORIDE 0.9 % (FLUSH) 0.9 %
5-40 SYRINGE (ML) INJECTION EVERY 12 HOURS SCHEDULED
Status: DISCONTINUED | OUTPATIENT
Start: 2024-02-05 | End: 2024-02-11 | Stop reason: HOSPADM

## 2024-02-05 RX ORDER — LACTULOSE 10 G/15ML
20 SOLUTION ORAL 3 TIMES DAILY
Status: DISCONTINUED | OUTPATIENT
Start: 2024-02-05 | End: 2024-02-11 | Stop reason: HOSPADM

## 2024-02-05 RX ORDER — ONDANSETRON 4 MG/1
4 TABLET, ORALLY DISINTEGRATING ORAL EVERY 8 HOURS PRN
Status: DISCONTINUED | OUTPATIENT
Start: 2024-02-05 | End: 2024-02-11 | Stop reason: HOSPADM

## 2024-02-05 RX ORDER — LIDOCAINE HYDROCHLORIDE 10 MG/ML
10 INJECTION, SOLUTION EPIDURAL; INFILTRATION; INTRACAUDAL; PERINEURAL ONCE
Status: COMPLETED | OUTPATIENT
Start: 2024-02-05 | End: 2024-02-05

## 2024-02-05 RX ORDER — FUROSEMIDE 20 MG/1
20 TABLET ORAL 3 TIMES DAILY
Status: DISCONTINUED | OUTPATIENT
Start: 2024-02-05 | End: 2024-02-07

## 2024-02-05 RX ORDER — SODIUM CHLORIDE 0.9 % (FLUSH) 0.9 %
5-40 SYRINGE (ML) INJECTION PRN
Status: DISCONTINUED | OUTPATIENT
Start: 2024-02-05 | End: 2024-02-11 | Stop reason: HOSPADM

## 2024-02-05 RX ORDER — FERROUS SULFATE 325(65) MG
325 TABLET ORAL
Status: DISCONTINUED | OUTPATIENT
Start: 2024-02-05 | End: 2024-02-11 | Stop reason: HOSPADM

## 2024-02-05 RX ORDER — FOLIC ACID 1 MG/1
1 TABLET ORAL DAILY
Status: DISCONTINUED | OUTPATIENT
Start: 2024-02-05 | End: 2024-02-11 | Stop reason: HOSPADM

## 2024-02-05 RX ORDER — POTASSIUM CHLORIDE 7.45 MG/ML
10 INJECTION INTRAVENOUS PRN
Status: DISCONTINUED | OUTPATIENT
Start: 2024-02-05 | End: 2024-02-11 | Stop reason: HOSPADM

## 2024-02-05 RX ORDER — POTASSIUM CHLORIDE 20 MEQ/1
10 TABLET, EXTENDED RELEASE ORAL DAILY
Status: DISCONTINUED | OUTPATIENT
Start: 2024-02-05 | End: 2024-02-05

## 2024-02-05 RX ORDER — FUROSEMIDE 20 MG/1
20 TABLET ORAL 2 TIMES DAILY
Status: DISCONTINUED | OUTPATIENT
Start: 2024-02-05 | End: 2024-02-05

## 2024-02-05 RX ORDER — MIDODRINE HYDROCHLORIDE 5 MG/1
5 TABLET ORAL ONCE
Status: COMPLETED | OUTPATIENT
Start: 2024-02-06 | End: 2024-02-05

## 2024-02-05 RX ORDER — SODIUM CHLORIDE 1 G/1
1 TABLET ORAL 3 TIMES DAILY
Status: DISCONTINUED | OUTPATIENT
Start: 2024-02-05 | End: 2024-02-11 | Stop reason: HOSPADM

## 2024-02-05 RX ORDER — ACETAMINOPHEN 325 MG/1
650 TABLET ORAL EVERY 6 HOURS PRN
Status: DISCONTINUED | OUTPATIENT
Start: 2024-02-05 | End: 2024-02-11 | Stop reason: HOSPADM

## 2024-02-05 RX ORDER — ACETAMINOPHEN 650 MG/1
650 SUPPOSITORY RECTAL EVERY 6 HOURS PRN
Status: DISCONTINUED | OUTPATIENT
Start: 2024-02-05 | End: 2024-02-11 | Stop reason: HOSPADM

## 2024-02-05 RX ORDER — PANTOPRAZOLE SODIUM 40 MG/1
40 TABLET, DELAYED RELEASE ORAL
Status: DISCONTINUED | OUTPATIENT
Start: 2024-02-05 | End: 2024-02-10

## 2024-02-05 RX ORDER — LANOLIN ALCOHOL/MO/W.PET/CERES
3 CREAM (GRAM) TOPICAL NIGHTLY PRN
Status: DISCONTINUED | OUTPATIENT
Start: 2024-02-05 | End: 2024-02-11 | Stop reason: HOSPADM

## 2024-02-05 RX ORDER — MAGNESIUM SULFATE IN WATER 40 MG/ML
2000 INJECTION, SOLUTION INTRAVENOUS PRN
Status: DISCONTINUED | OUTPATIENT
Start: 2024-02-05 | End: 2024-02-11 | Stop reason: HOSPADM

## 2024-02-05 RX ADMIN — MELATONIN TAB 3 MG 3 MG: 3 TAB at 21:20

## 2024-02-05 RX ADMIN — MIDODRINE HYDROCHLORIDE 5 MG: 5 TABLET ORAL at 12:34

## 2024-02-05 RX ADMIN — LACTULOSE 20 G: 20 SOLUTION ORAL at 15:17

## 2024-02-05 RX ADMIN — Medication 1 SPRAY: at 23:22

## 2024-02-05 RX ADMIN — DEXTROAMPHETAMINE SACCHARATE, AMPHETAMINE ASPARTATE MONOHYDRATE, DEXTROAMPHETAMINE SULFATE AND AMPHETAMINE SULFATE 20 MG: 2.5; 2.5; 2.5; 2.5 CAPSULE, EXTENDED RELEASE ORAL at 08:34

## 2024-02-05 RX ADMIN — FOLIC ACID 1 MG: 1 TABLET ORAL at 08:34

## 2024-02-05 RX ADMIN — LACTULOSE 20 G: 20 SOLUTION ORAL at 21:11

## 2024-02-05 RX ADMIN — RIFAXIMIN 550 MG: 550 TABLET ORAL at 08:38

## 2024-02-05 RX ADMIN — ALBUMIN (HUMAN) 25 G: 0.25 INJECTION, SOLUTION INTRAVENOUS at 23:37

## 2024-02-05 RX ADMIN — TRAMADOL HYDROCHLORIDE 50 MG: 50 TABLET ORAL at 16:49

## 2024-02-05 RX ADMIN — SODIUM CHLORIDE 1 G: 1 TABLET ORAL at 21:11

## 2024-02-05 RX ADMIN — ONDANSETRON 4 MG: 2 INJECTION INTRAMUSCULAR; INTRAVENOUS at 08:29

## 2024-02-05 RX ADMIN — LACTULOSE 20 G: 20 SOLUTION ORAL at 08:33

## 2024-02-05 RX ADMIN — SPIRONOLACTONE 25 MG: 25 TABLET ORAL at 15:17

## 2024-02-05 RX ADMIN — FUROSEMIDE 20 MG: 20 TABLET ORAL at 08:34

## 2024-02-05 RX ADMIN — RIFAXIMIN 550 MG: 550 TABLET ORAL at 21:11

## 2024-02-05 RX ADMIN — SODIUM CHLORIDE 1 G: 1 TABLET ORAL at 08:34

## 2024-02-05 RX ADMIN — MIDODRINE HYDROCHLORIDE 5 MG: 5 TABLET ORAL at 23:57

## 2024-02-05 RX ADMIN — MIDODRINE HYDROCHLORIDE 5 MG: 5 TABLET ORAL at 08:34

## 2024-02-05 RX ADMIN — LIDOCAINE HYDROCHLORIDE 10 ML: 10 INJECTION, SOLUTION EPIDURAL; INFILTRATION; INTRACAUDAL; PERINEURAL at 14:16

## 2024-02-05 RX ADMIN — FUROSEMIDE 20 MG: 20 TABLET ORAL at 15:17

## 2024-02-05 RX ADMIN — ONDANSETRON 4 MG: 4 TABLET, ORALLY DISINTEGRATING ORAL at 01:45

## 2024-02-05 RX ADMIN — FERROUS SULFATE TAB 325 MG (65 MG ELEMENTAL FE) 325 MG: 325 (65 FE) TAB at 08:34

## 2024-02-05 RX ADMIN — FUROSEMIDE 20 MG: 20 TABLET ORAL at 21:11

## 2024-02-05 RX ADMIN — SODIUM CHLORIDE, PRESERVATIVE FREE 10 ML: 5 INJECTION INTRAVENOUS at 08:38

## 2024-02-05 RX ADMIN — SODIUM CHLORIDE, PRESERVATIVE FREE 10 ML: 5 INJECTION INTRAVENOUS at 21:11

## 2024-02-05 RX ADMIN — MIDODRINE HYDROCHLORIDE 5 MG: 5 TABLET ORAL at 17:44

## 2024-02-05 RX ADMIN — SODIUM CHLORIDE 1 G: 1 TABLET ORAL at 15:17

## 2024-02-05 RX ADMIN — POTASSIUM CHLORIDE 10 MEQ: 1500 TABLET, EXTENDED RELEASE ORAL at 08:33

## 2024-02-05 NOTE — H&P
V2.0  History and Physical      Name:  Angélica Gonzalez /Age/Sex: 1985  (38 y.o. female)   MRN & CSN:  [id number] & [id number] Encounter Date/Time: 2024 9:49 PM EST   Location:  [facility] PCP: Zi Dee Dee       [facility] Day: 1    Assessment and Plan:   Angélica Gonzalez is a 38 y.o. female with a pmh of liver failure, alcoholic liver cirrhosis with recurrent paracentesis, chronic hyponatremia who presents with constipation and dizziness.  Found to have severe hyponatremia due to fluid retention.  Hospital Problems             Last Modified POA    * (Principal) Hyponatremia syndrome 2024 Yes       Plan:  # Hypervolemic hyponatremia d/t large volume ascites.   # Dizziness due to above.  # Alcoholic liver cirrhosis with ascites  # Multiple paracentesis.  # Jaundice  # Constipation  Sodium of 118.  Was 122 on 1/3 0.  Noted chronic hyponatremia.  On sodium tablets.  Reports compliance.  On biweekly paracentesis.  Last paracentesis  and 1.2 L removed.  -Admit to [facility] with telemetry.  -Consult to nephrology.  -Consult interventional radiology for possible paracentesis.  -Resume home sodium tablet.  -Repeat BMP in AM.  -Fall precautions.  -Unremarkable KUB.  -Resume home rifaximin and lactulose    # Mild leukocytosis at 11.6.  Likely due to above. Pending urinalysis. No urinary symptoms.  Repeat in AM.    # Chronic anemia thrombocytopenia.  Hemoglobin stable at 7.8. and platelets stable at 94    # History of chronic alcohol abuse.          Disposition:   Current Living situation: Lives at home with family  Expected Disposition: Home  Estimated D/C: 1 to 2 days    Diet Adult diet   DVT Prophylaxis [] Lovenox, []  Heparin, [] SCDs, [] Ambulation,  [] Eliquis, [] Xarelto, [] Coumadin   Code Status Full code   Surrogate Decision Maker/ POA Self, father/dad     Personally reviewed Lab Studies and Imaging     Discussed management of the case with Dr. Vicente who recommended continue current

## 2024-02-05 NOTE — CARE COORDINATION
02/05/24 0905   Readmission Assessment   Number of Days since last admission? 1-7 days   Previous Disposition Home with Family   Who is being Interviewed Patient   What was the patient's/caregiver's perception as to why they think they needed to return back to the hospital? Other (Comment)  (Previous admission was for liver failure and this admission is due to the fact that patient hasn't had a bowel movement since 5:00am yesterday.)   Did you visit your Primary Care Physician after you left the hospital, before you returned this time? No   Why weren't you able to visit your PCP? Did not have an appointment   Did you see a specialist, such as Cardiac, Pulmonary, Orthopedic Physician, etc. after you left the hospital? No   Who advised the patient to return to the hospital? Self-referral   Does the patient report anything that got in the way of taking their medications? No   In our efforts to provide the best possible care to you and others like you, can you think of anything that we could have done to help you after you left the hospital the first time, so that you might not have needed to return so soon? Other (Comment)  (Previous admission was for liver failure and this admission is due to the fact that patient hasn't had a bowel movement since 5:00am yesterday.)     Electronically signed by MARYANN Foss on 2/5/2024 at 9:09 AM

## 2024-02-05 NOTE — ACP (ADVANCE CARE PLANNING)
Advance Care Planning Note       Purpose of Encounter: Advanced care planning in light of hospitalization for hyponatremia  Parties in attendance: :Angélica Gonzalez, CYNTHIA GARG MD  Decisional Capacity:Yes  Objective:  This 38-year-old female with PMHx of liver failure, alcoholic liver cirrhosis with recurrent paracentesis admitted for hyponatremia and pulm overload  Goals of Care Determinations: Patient wants full support measures including CPR, intubation, trach, PEG tube, dialysis   Code Status:   Time Spent on Advanced Planning Documents: 16 mins.  Advanced Care Planning Documents:  Completed advances directives, advanced directives in chart.      Electronically signed by CYNTHIA GARG MD on 2/5/2024 at 4:48 PM

## 2024-02-05 NOTE — CARE COORDINATION
Case Management Assessment  Initial Evaluation    Date/Time of Evaluation: 2/5/2024 9:16 AM  Assessment Completed by: MARYANN Foss    If patient is discharged prior to next notation, then this note serves as note for discharge by case management.    Patient Name: Angélica Gonzalez                   YOB: 1985  Diagnosis: Hyperbilirubinemia [E80.6]  Dizziness [R42]  Hyponatremia [E87.1]  Hyponatremia syndrome [E87.1]  Acute liver failure without hepatic coma [K72.00]                   Date / Time: 2/4/2024  7:48 PM    Patient Admission Status: Inpatient   Readmission Risk (Low < 19, Mod (19-27), High > 27): Readmission Risk Score: 25    Current PCP: Dee Dee Pinon  PCP verified by CM? Yes    Chart Reviewed: Yes      History Provided by: Patient  Patient Orientation: Alert and Oriented    Patient Cognition: Alert    Hospitalization in the last 30 days (Readmission):  Yes    If yes, Readmission Assessment in CM Navigator will be completed.    Advance Directives:      Code Status: Full Code   Patient's Primary Decision Maker is: Legal Next of Kin      Discharge Planning:    Patient lives with: Family Members, Parent (Currently living with Mother, Father and Son in [place], [place].) Type of Home: House  Primary Care Giver: Self (Currently living with Mother, Father and Son in [place], [place].)  Patient Support Systems include: Family Members, Parent   Current Financial resources: Medicaid  Current community resources:    Current services prior to admission: Durable Medical Equipment            Current DME: Cane, Shower Chair, Walker            Type of Home Care services:  None    ADLS  Prior functional level: Independent in ADLs/IADLs  Current functional level: Independent in ADLs/IADLs    PT AM-PAC:   /24  OT AM-PAC:   /24    Family can provide assistance at DC: Yes  Would you like Case Management to discuss the discharge plan with any other family members/significant others, and if

## 2024-02-05 NOTE — ED PROVIDER NOTES
[facility] Emergency Department      Pt Name: Angélica Gonzalez  MRN: [id number]  Birthdate 1985  Date of evaluation: 2/4/2024  Provider: BECCA REDMOND MD  I personally saw Angélica Gonzalez and made and approved the management plan with the advanced practice provider.  I take responsibility for the patient management.     HPI: Angélica Gonzalez presented with   Chief Complaint   Patient presents with    Constipation     From home. Stage IV liver disease, states she is supposed to have 3-4 BM's per day but has not gone since 05:00 today. C/O abdominal bloating and swelling. Denies pain, N/V.     Angélica Gonzalez has a past medical history of ADHD, Cirrhosis (HCC), Dyslexia, ETOH abuse, Hepatic cirrhosis (HCC), and History of blood transfusion.  She has a past surgical history that includes Paracentesis (10/14/2023) and Upper gastrointestinal endoscopy (N/A, 11/15/2023).    No current facility-administered medications on file prior to encounter.     Current Outpatient Medications on File Prior to Encounter   Medication Sig Dispense Refill    lactulose (CHRONULAC) 10 GM/15ML solution Take 30 mLs by mouth 3 times daily 2700 mL 0    sodium chloride 1 g tablet Take 1 tablet by mouth 3 times daily 90 tablet 3    potassium chloride (KLOR-CON M) 10 MEQ extended release tablet Take 1 tablet by mouth daily for 10 days 10 tablet 0    rifAXIMin (XIFAXAN) 550 MG tablet Take 1 tablet by mouth 2 times daily 42 tablet 0    midodrine (PROAMATINE) 5 MG tablet Take 1 tablet by mouth 3 times daily (with meals) 90 tablet 0    omeprazole (PRILOSEC) 20 MG delayed release capsule Take 1 capsule by mouth every morning (before breakfast) 90 capsule 1    furosemide (LASIX) 20 MG tablet Take 1 tablet by mouth 2 times daily 60 tablet 3    ferrous sulfate (IRON 325) 325 (65 Fe) MG tablet Take 1 tablet by mouth daily (with breakfast) 30 tablet 5    amphetamine-dextroamphetamine (ADDERALL) 15 MG tablet Take 1 tablet by

## 2024-02-05 NOTE — ED PROVIDER NOTES
[facility]  EMERGENCY DEPARTMENT ENCOUNTER        Pt Name: Angélica Gonzalez  MRN: [id number]  Birthdate 1985  Date of evaluation: 2/4/2024  Provider: MOO Keita CNP  PCP: Dee Dee Pinon  Note Started: 7:52 PM EST 2/4/24       I have seen and evaluated this patient with my supervising physician joyce      CHIEF COMPLAINT       Chief Complaint   Patient presents with    Constipation     From home. Stage IV liver disease, states she is supposed to have 3-4 BM's per day but has not gone since 05:00 today. C/O abdominal bloating and swelling. Denies pain, N/V.       HISTORY OF PRESENT ILLNESS: 1 or more Elements     History from : Patient    Limitations to history : None    Angélica Gonzalez is a 38 y.o. female who presents to the emergency department with complaint of constipation.  The patient reports that she is taking the medication as directed at time of discharge 3 days ago and that she has not had a bowel movement since 5:00 this morning.  She is tearful, states she feels like she is going to pass out.  She is very anxious.  Reports that she is staying with her parents about an hour and a half away, stopped at the closest hospital learned that the weight was at least an hour so she decided to come to this hospital where she is established and has been admitted multiple times since January.    She is extremely jaundiced, states that this is normal.  She is not vomiting but does report that her abdomen feels \"full\".  She has an outpatient paracentesis scheduled for Tuesday of this week.  Last paracentesis was during hospitalization on 1/27/2024.    Denies any headache, fever, visual disturbances.  No chest pain or pressure.  No neck or back pain.  No shortness of breath, cough, or congestion.  No vomiting, diarrhea, or dysuria.  No rash.    Nursing Notes were all reviewed and agreed with or any disagreements were addressed in the HPI.    REVIEW OF SYSTEMS :

## 2024-02-05 NOTE — PLAN OF CARE
Problem: Discharge Planning  Goal: Discharge to home or other facility with appropriate resources  Outcome: Progressing  Flowsheets (Taken 2/5/2024 0016)  Discharge to home or other facility with appropriate resources:   Identify barriers to discharge with patient and caregiver   Arrange for needed discharge resources and transportation as appropriate   Identify discharge learning needs (meds, wound care, etc)   Refer to discharge planning if patient needs post-hospital services based on physician order or complex needs related to functional status, cognitive ability or social support system

## 2024-02-05 NOTE — BRIEF OP NOTE
Brief Postoperative Note    Angélica Gonzalez  YOB: 1985  [id number]    Pre-operative Diagnosis: ascites    Post-operative Diagnosis: Same    Procedure: US-guided paracentesis    Anesthesia: Local    Surgeons: Kermit Espino MD    Estimated Blood Loss: Less than 5 mL    Complications: None    Specimens: ascites drained    Findings: Successful US-guided paracentesis.    Electronically signed by Kermit Espino MD on 2/5/2024 at 2:13 PM

## 2024-02-05 NOTE — ED NOTES
ED TO INPATIENT SBAR HANDOFF    Patient Name: Angélica Gonzalez   :  1985  38 y.o.   MRN:  [id number]  Preferred Name  Angélica  ED Room #:  ED-0008/08  Family/Caregiver Present yes   Restraints no   Sitter no   Sepsis Risk Score Sepsis Risk Score: 1.41    Situation  Code Status: Full code No additional code details.    Allergies: Haloperidol  Weight: Patient Vitals for the past 96 hrs (Last 3 readings):   Weight   24 93.3 kg (205 lb 9.6 oz)     Arrived from: home  Chief Complaint:   Chief Complaint   Patient presents with    Constipation     From home. Stage IV liver disease, states she is supposed to have 3-4 BM's per day but has not gone since 05:00 today. C/O abdominal bloating and swelling. Denies pain, N/V.     Hospital Problem/Diagnosis:  Principal Problem:    Hyponatremia syndrome  Resolved Problems:    * No resolved hospital problems. *    Imaging:   XR ABDOMEN (KUB) (SINGLE AP VIEW)   Final Result   Unremarkable abdomen           Abnormal labs:   Abnormal Labs Reviewed   CBC WITH AUTO DIFFERENTIAL - Abnormal; Notable for the following components:       Result Value    WBC 11.6 (*)     RBC 2.22 (*)     Hemoglobin 7.8 (*)     Hematocrit 22.7 (*)     .4 (*)     MCH 35.3 (*)     RDW 24.1 (*)     Platelets 94 (*)     Neutrophils Absolute 10.1 (*)     Lymphocytes Absolute 0.8 (*)     Toxic Granulation Present (*)     Anisocytosis 1+ (*)     Polychromasia Occasional (*)     Hypochromia 1+ (*)     Schistocytes Occasional (*)     Acanthocytes 1+ (*)     Arslan Cells 1+ (*)     All other components within normal limits   COMPREHENSIVE METABOLIC PANEL - Abnormal; Notable for the following components:    Sodium 118 (*)     Chloride 86 (*)     Creatinine <0.5 (*)     Total Protein 5.7 (*)     Albumin/Globulin Ratio 2.4 (*)     Total Bilirubin 18.4 (*)     Alkaline Phosphatase 280 (*)     AST 93 (*)     All other components within normal limits    Narrative:     CALL  [facility]  [facility] tel.

## 2024-02-05 NOTE — ED TRIAGE NOTES
PT arrived from home via private means. States she has a hx of stage IV liver disease, has been inpatient several times recently for her liver disease with last discharge on 2/1. PT states she is taking all of her medications as prescribed and was told she is supposed to have 3-4 BM's per day but has not gone since 05:00 today. C/O abdominal bloating and swelling, shaking,. Denies pain, N/V. PT is alert and oriented in triage, complaining that she feels she is about to pass out.   PT states she went to [facility] prior to arrival but the wait was too long, was feeling fine prior to arrival but began feeling lightheaded upon arrival

## 2024-02-06 LAB
ALBUMIN SERPL-MCNC: 3.7 G/DL (ref 3.4–5)
ALP SERPL-CCNC: 200 U/L (ref 40–129)
ALT SERPL-CCNC: 34 U/L (ref 10–40)
ANION GAP SERPL CALCULATED.3IONS-SCNC: 10 MMOL/L (ref 3–16)
AST SERPL-CCNC: 86 U/L (ref 15–37)
BILIRUB DIRECT SERPL-MCNC: 6.7 MG/DL (ref 0–0.3)
BILIRUB INDIRECT SERPL-MCNC: 10.8 MG/DL (ref 0–1)
BILIRUB SERPL-MCNC: 17.5 MG/DL (ref 0–1)
BUN SERPL-MCNC: 10 MG/DL (ref 7–20)
CALCIUM SERPL-MCNC: 8.4 MG/DL (ref 8.3–10.6)
CHLORIDE SERPL-SCNC: 88 MMOL/L (ref 99–110)
CO2 SERPL-SCNC: 21 MMOL/L (ref 21–32)
CREAT SERPL-MCNC: <0.5 MG/DL (ref 0.6–1.1)
DEPRECATED RDW RBC AUTO: 24.1 % (ref 12.4–15.4)
GFR SERPLBLD CREATININE-BSD FMLA CKD-EPI: >60 ML/MIN/{1.73_M2}
GLUCOSE BLD-MCNC: 138 MG/DL (ref 70–99)
GLUCOSE SERPL-MCNC: 119 MG/DL (ref 70–99)
HCT VFR BLD AUTO: 19.8 % (ref 36–48)
HGB BLD-MCNC: 7 G/DL (ref 12–16)
MCH RBC QN AUTO: 35.6 PG (ref 26–34)
MCHC RBC AUTO-ENTMCNC: 35.2 G/DL (ref 31–36)
MCV RBC AUTO: 101.2 FL (ref 80–100)
PERFORMED ON: ABNORMAL
PHOSPHATE SERPL-MCNC: 3.1 MG/DL (ref 2.5–4.9)
PLATELET # BLD AUTO: 91 K/UL (ref 135–450)
PMV BLD AUTO: 6.8 FL (ref 5–10.5)
POTASSIUM SERPL-SCNC: 5 MMOL/L (ref 3.5–5.1)
PROT SERPL-MCNC: 5.3 G/DL (ref 6.4–8.2)
RBC # BLD AUTO: 1.96 M/UL (ref 4–5.2)
SODIUM SERPL-SCNC: 116 MMOL/L (ref 136–145)
SODIUM SERPL-SCNC: 117 MMOL/L (ref 136–145)
SODIUM SERPL-SCNC: 117 MMOL/L (ref 136–145)
SODIUM SERPL-SCNC: 118 MMOL/L (ref 136–145)
SODIUM SERPL-SCNC: 119 MMOL/L (ref 136–145)
SODIUM SERPL-SCNC: 120 MMOL/L (ref 136–145)
WBC # BLD AUTO: 11.1 K/UL (ref 4–11)

## 2024-02-06 PROCEDURE — 6370000000 HC RX 637 (ALT 250 FOR IP): Performed by: INTERNAL MEDICINE

## 2024-02-06 PROCEDURE — 80076 HEPATIC FUNCTION PANEL: CPT

## 2024-02-06 PROCEDURE — 2060000000 HC ICU INTERMEDIATE R&B

## 2024-02-06 PROCEDURE — 6360000002 HC RX W HCPCS: Performed by: NURSE PRACTITIONER

## 2024-02-06 PROCEDURE — 84295 ASSAY OF SERUM SODIUM: CPT

## 2024-02-06 PROCEDURE — 6370000000 HC RX 637 (ALT 250 FOR IP): Performed by: NURSE PRACTITIONER

## 2024-02-06 PROCEDURE — 2580000003 HC RX 258: Performed by: NURSE PRACTITIONER

## 2024-02-06 PROCEDURE — 80069 RENAL FUNCTION PANEL: CPT

## 2024-02-06 PROCEDURE — 85027 COMPLETE CBC AUTOMATED: CPT

## 2024-02-06 PROCEDURE — 36415 COLL VENOUS BLD VENIPUNCTURE: CPT

## 2024-02-06 RX ADMIN — SODIUM CHLORIDE 1 G: 1 TABLET ORAL at 14:34

## 2024-02-06 RX ADMIN — SPIRONOLACTONE 25 MG: 25 TABLET ORAL at 08:40

## 2024-02-06 RX ADMIN — RIFAXIMIN 550 MG: 550 TABLET ORAL at 08:41

## 2024-02-06 RX ADMIN — MELATONIN TAB 3 MG 3 MG: 3 TAB at 21:00

## 2024-02-06 RX ADMIN — LACTULOSE 20 G: 20 SOLUTION ORAL at 14:34

## 2024-02-06 RX ADMIN — SODIUM CHLORIDE 1 G: 1 TABLET ORAL at 08:41

## 2024-02-06 RX ADMIN — SODIUM CHLORIDE 1 G: 1 TABLET ORAL at 21:00

## 2024-02-06 RX ADMIN — MIDODRINE HYDROCHLORIDE 5 MG: 5 TABLET ORAL at 08:40

## 2024-02-06 RX ADMIN — FOLIC ACID 1 MG: 1 TABLET ORAL at 08:40

## 2024-02-06 RX ADMIN — FUROSEMIDE 20 MG: 20 TABLET ORAL at 15:21

## 2024-02-06 RX ADMIN — FERROUS SULFATE TAB 325 MG (65 MG ELEMENTAL FE) 325 MG: 325 (65 FE) TAB at 08:41

## 2024-02-06 RX ADMIN — DEXTROAMPHETAMINE SACCHARATE, AMPHETAMINE ASPARTATE MONOHYDRATE, DEXTROAMPHETAMINE SULFATE AND AMPHETAMINE SULFATE 20 MG: 2.5; 2.5; 2.5; 2.5 CAPSULE, EXTENDED RELEASE ORAL at 08:41

## 2024-02-06 RX ADMIN — TRAMADOL HYDROCHLORIDE 50 MG: 50 TABLET ORAL at 23:32

## 2024-02-06 RX ADMIN — SODIUM CHLORIDE, PRESERVATIVE FREE 10 ML: 5 INJECTION INTRAVENOUS at 08:43

## 2024-02-06 RX ADMIN — FUROSEMIDE 20 MG: 20 TABLET ORAL at 21:00

## 2024-02-06 RX ADMIN — LACTULOSE 20 G: 20 SOLUTION ORAL at 08:41

## 2024-02-06 RX ADMIN — PANTOPRAZOLE SODIUM 40 MG: 40 TABLET, DELAYED RELEASE ORAL at 06:25

## 2024-02-06 RX ADMIN — SODIUM CHLORIDE, PRESERVATIVE FREE 10 ML: 5 INJECTION INTRAVENOUS at 21:00

## 2024-02-06 RX ADMIN — RIFAXIMIN 550 MG: 550 TABLET ORAL at 21:00

## 2024-02-06 RX ADMIN — MIDODRINE HYDROCHLORIDE 5 MG: 5 TABLET ORAL at 12:51

## 2024-02-06 RX ADMIN — ONDANSETRON 4 MG: 2 INJECTION INTRAMUSCULAR; INTRAVENOUS at 15:21

## 2024-02-06 RX ADMIN — MIDODRINE HYDROCHLORIDE 5 MG: 5 TABLET ORAL at 17:25

## 2024-02-06 RX ADMIN — FUROSEMIDE 20 MG: 20 TABLET ORAL at 08:41

## 2024-02-06 RX ADMIN — LACTULOSE 20 G: 20 SOLUTION ORAL at 21:00

## 2024-02-07 LAB
ABO + RH BLD: NORMAL
ALBUMIN SERPL-MCNC: 3.2 G/DL (ref 3.4–5)
ALP SERPL-CCNC: 171 U/L (ref 40–129)
ALT SERPL-CCNC: 30 U/L (ref 10–40)
ANION GAP SERPL CALCULATED.3IONS-SCNC: 10 MMOL/L (ref 3–16)
AST SERPL-CCNC: 67 U/L (ref 15–37)
BILIRUB DIRECT SERPL-MCNC: 6.6 MG/DL (ref 0–0.3)
BILIRUB INDIRECT SERPL-MCNC: 9.5 MG/DL (ref 0–1)
BILIRUB SERPL-MCNC: 16.1 MG/DL (ref 0–1)
BLD GP AB SCN SERPL QL: NORMAL
BLOOD BANK DISPENSE STATUS: NORMAL
BLOOD BANK DISPENSE STATUS: NORMAL
BLOOD BANK PRODUCT CODE: NORMAL
BLOOD BANK PRODUCT CODE: NORMAL
BPU ID: NORMAL
BPU ID: NORMAL
BUN SERPL-MCNC: 12 MG/DL (ref 7–20)
CALCIUM SERPL-MCNC: 8.2 MG/DL (ref 8.3–10.6)
CHLORIDE SERPL-SCNC: 90 MMOL/L (ref 99–110)
CO2 SERPL-SCNC: 20 MMOL/L (ref 21–32)
CREAT SERPL-MCNC: <0.5 MG/DL (ref 0.6–1.1)
DAT POLY-SP REAG RBC QL: NORMAL
DEPRECATED RDW RBC AUTO: 22.9 % (ref 12.4–15.4)
DESCRIPTION BLOOD BANK: NORMAL
DESCRIPTION BLOOD BANK: NORMAL
FOLATE SERPL-MCNC: 9.38 NG/ML (ref 4.78–24.2)
GFR SERPLBLD CREATININE-BSD FMLA CKD-EPI: >60 ML/MIN/{1.73_M2}
GLUCOSE SERPL-MCNC: 136 MG/DL (ref 70–99)
HCT VFR BLD AUTO: 17 % (ref 36–48)
HCT VFR BLD AUTO: 17.5 % (ref 36–48)
HCT VFR BLD AUTO: 19.9 % (ref 36–48)
HCT VFR BLD AUTO: 19.9 % (ref 36–48)
HEMOCCULT STL QL: NORMAL
HGB BLD-MCNC: 5.9 G/DL (ref 12–16)
HGB BLD-MCNC: 6.3 G/DL (ref 12–16)
HGB BLD-MCNC: 6.9 G/DL (ref 12–16)
HGB BLD-MCNC: 7.1 G/DL (ref 12–16)
MCH RBC QN AUTO: 35.4 PG (ref 26–34)
MCHC RBC AUTO-ENTMCNC: 34.7 G/DL (ref 31–36)
MCV RBC AUTO: 102.1 FL (ref 80–100)
PHOSPHATE SERPL-MCNC: 3.4 MG/DL (ref 2.5–4.9)
PLATELET # BLD AUTO: 92 K/UL (ref 135–450)
PMV BLD AUTO: 6.9 FL (ref 5–10.5)
POTASSIUM SERPL-SCNC: 5.2 MMOL/L (ref 3.5–5.1)
PROT SERPL-MCNC: 4.7 G/DL (ref 6.4–8.2)
RBC # BLD AUTO: 1.66 M/UL (ref 4–5.2)
SODIUM SERPL-SCNC: 117 MMOL/L (ref 136–145)
SODIUM SERPL-SCNC: 118 MMOL/L (ref 136–145)
SODIUM SERPL-SCNC: 119 MMOL/L (ref 136–145)
SODIUM SERPL-SCNC: 119 MMOL/L (ref 136–145)
SODIUM SERPL-SCNC: 120 MMOL/L (ref 136–145)
VIT B12 SERPL-MCNC: 1340 PG/ML (ref 211–911)
WBC # BLD AUTO: 13.9 K/UL (ref 4–11)

## 2024-02-07 PROCEDURE — 6370000000 HC RX 637 (ALT 250 FOR IP): Performed by: NURSE PRACTITIONER

## 2024-02-07 PROCEDURE — 83540 ASSAY OF IRON: CPT

## 2024-02-07 PROCEDURE — 83010 ASSAY OF HAPTOGLOBIN QUANT: CPT

## 2024-02-07 PROCEDURE — 83615 LACTATE (LD) (LDH) ENZYME: CPT

## 2024-02-07 PROCEDURE — 36430 TRANSFUSION BLD/BLD COMPNT: CPT

## 2024-02-07 PROCEDURE — 84466 ASSAY OF TRANSFERRIN: CPT

## 2024-02-07 PROCEDURE — 85014 HEMATOCRIT: CPT

## 2024-02-07 PROCEDURE — 85018 HEMOGLOBIN: CPT

## 2024-02-07 PROCEDURE — 82728 ASSAY OF FERRITIN: CPT

## 2024-02-07 PROCEDURE — 86901 BLOOD TYPING SEROLOGIC RH(D): CPT

## 2024-02-07 PROCEDURE — 84295 ASSAY OF SERUM SODIUM: CPT

## 2024-02-07 PROCEDURE — 86850 RBC ANTIBODY SCREEN: CPT

## 2024-02-07 PROCEDURE — 86880 COOMBS TEST DIRECT: CPT

## 2024-02-07 PROCEDURE — P9016 RBC LEUKOCYTES REDUCED: HCPCS

## 2024-02-07 PROCEDURE — 86923 COMPATIBILITY TEST ELECTRIC: CPT

## 2024-02-07 PROCEDURE — 82270 OCCULT BLOOD FECES: CPT

## 2024-02-07 PROCEDURE — 86900 BLOOD TYPING SEROLOGIC ABO: CPT

## 2024-02-07 PROCEDURE — 80076 HEPATIC FUNCTION PANEL: CPT

## 2024-02-07 PROCEDURE — 6370000000 HC RX 637 (ALT 250 FOR IP): Performed by: INTERNAL MEDICINE

## 2024-02-07 PROCEDURE — 36415 COLL VENOUS BLD VENIPUNCTURE: CPT

## 2024-02-07 PROCEDURE — 80069 RENAL FUNCTION PANEL: CPT

## 2024-02-07 PROCEDURE — 2060000000 HC ICU INTERMEDIATE R&B

## 2024-02-07 PROCEDURE — 85027 COMPLETE CBC AUTOMATED: CPT

## 2024-02-07 PROCEDURE — 2580000003 HC RX 258: Performed by: NURSE PRACTITIONER

## 2024-02-07 PROCEDURE — 6360000002 HC RX W HCPCS: Performed by: NURSE PRACTITIONER

## 2024-02-07 RX ORDER — SODIUM CHLORIDE 9 MG/ML
INJECTION, SOLUTION INTRAVENOUS PRN
Status: DISCONTINUED | OUTPATIENT
Start: 2024-02-07 | End: 2024-02-11 | Stop reason: HOSPADM

## 2024-02-07 RX ORDER — TOLVAPTAN 15 MG/1
15 TABLET ORAL ONCE
Status: COMPLETED | OUTPATIENT
Start: 2024-02-07 | End: 2024-02-07

## 2024-02-07 RX ORDER — SODIUM CHLORIDE 9 MG/ML
INJECTION, SOLUTION INTRAVENOUS PRN
Status: DISCONTINUED | OUTPATIENT
Start: 2024-02-07 | End: 2024-02-08

## 2024-02-07 RX ADMIN — ONDANSETRON 4 MG: 2 INJECTION INTRAMUSCULAR; INTRAVENOUS at 08:57

## 2024-02-07 RX ADMIN — SPIRONOLACTONE 25 MG: 25 TABLET ORAL at 08:21

## 2024-02-07 RX ADMIN — SODIUM CHLORIDE, PRESERVATIVE FREE 10 ML: 5 INJECTION INTRAVENOUS at 21:14

## 2024-02-07 RX ADMIN — FUROSEMIDE 20 MG: 20 TABLET ORAL at 08:31

## 2024-02-07 RX ADMIN — MELATONIN TAB 3 MG 3 MG: 3 TAB at 21:21

## 2024-02-07 RX ADMIN — MIDODRINE HYDROCHLORIDE 5 MG: 5 TABLET ORAL at 12:51

## 2024-02-07 RX ADMIN — SODIUM CHLORIDE, PRESERVATIVE FREE 10 ML: 5 INJECTION INTRAVENOUS at 08:26

## 2024-02-07 RX ADMIN — FERROUS SULFATE TAB 325 MG (65 MG ELEMENTAL FE) 325 MG: 325 (65 FE) TAB at 08:21

## 2024-02-07 RX ADMIN — SODIUM CHLORIDE 1 G: 1 TABLET ORAL at 08:21

## 2024-02-07 RX ADMIN — RIFAXIMIN 550 MG: 550 TABLET ORAL at 08:31

## 2024-02-07 RX ADMIN — MIDODRINE HYDROCHLORIDE 5 MG: 5 TABLET ORAL at 17:25

## 2024-02-07 RX ADMIN — PANTOPRAZOLE SODIUM 40 MG: 40 TABLET, DELAYED RELEASE ORAL at 05:11

## 2024-02-07 RX ADMIN — RIFAXIMIN 550 MG: 550 TABLET ORAL at 21:13

## 2024-02-07 RX ADMIN — TOLVAPTAN 15 MG: 15 TABLET ORAL at 12:51

## 2024-02-07 RX ADMIN — LACTULOSE 20 G: 20 SOLUTION ORAL at 14:13

## 2024-02-07 RX ADMIN — MIDODRINE HYDROCHLORIDE 5 MG: 5 TABLET ORAL at 08:21

## 2024-02-07 RX ADMIN — SODIUM CHLORIDE 1 G: 1 TABLET ORAL at 14:13

## 2024-02-07 RX ADMIN — SODIUM CHLORIDE 1 G: 1 TABLET ORAL at 21:13

## 2024-02-07 RX ADMIN — FOLIC ACID 1 MG: 1 TABLET ORAL at 08:21

## 2024-02-07 RX ADMIN — LACTULOSE 20 G: 20 SOLUTION ORAL at 08:20

## 2024-02-07 RX ADMIN — DEXTROAMPHETAMINE SACCHARATE, AMPHETAMINE ASPARTATE MONOHYDRATE, DEXTROAMPHETAMINE SULFATE AND AMPHETAMINE SULFATE 20 MG: 2.5; 2.5; 2.5; 2.5 CAPSULE, EXTENDED RELEASE ORAL at 08:20

## 2024-02-07 NOTE — PLAN OF CARE
Problem: Discharge Planning  Goal: Discharge to home or other facility with appropriate resources  Outcome: Progressing     Problem: ABCDS Injury Assessment  Goal: Absence of physical injury  Outcome: Progressing     Problem: Gastrointestinal - Adult  Goal: Maintains or returns to baseline bowel function  Outcome: Progressing     Problem: Gastrointestinal - Adult  Goal: Maintains adequate nutritional intake  Outcome: Progressing

## 2024-02-08 PROBLEM — D69.6 THROMBOCYTOPENIA (HCC): Status: ACTIVE | Noted: 2024-02-08

## 2024-02-08 LAB
ALBUMIN SERPL-MCNC: 3.5 G/DL (ref 3.4–5)
ALP SERPL-CCNC: 166 U/L (ref 40–129)
ALT SERPL-CCNC: 28 U/L (ref 10–40)
ANION GAP SERPL CALCULATED.3IONS-SCNC: 12 MMOL/L (ref 3–16)
AST SERPL-CCNC: 61 U/L (ref 15–37)
BACTERIA FLD AEROBE CULT: NORMAL
BILIRUB DIRECT SERPL-MCNC: 7.1 MG/DL (ref 0–0.3)
BILIRUB INDIRECT SERPL-MCNC: 10.2 MG/DL (ref 0–1)
BILIRUB SERPL-MCNC: 17.3 MG/DL (ref 0–1)
BUN SERPL-MCNC: 11 MG/DL (ref 7–20)
CALCIUM SERPL-MCNC: 8.3 MG/DL (ref 8.3–10.6)
CHLORIDE SERPL-SCNC: 87 MMOL/L (ref 99–110)
CO2 SERPL-SCNC: 20 MMOL/L (ref 21–32)
CREAT SERPL-MCNC: <0.5 MG/DL (ref 0.6–1.1)
DEPRECATED RDW RBC AUTO: 25.3 % (ref 12.4–15.4)
FERRITIN SERPL IA-MCNC: 613.3 NG/ML (ref 15–150)
GFR SERPLBLD CREATININE-BSD FMLA CKD-EPI: >60 ML/MIN/{1.73_M2}
GLUCOSE SERPL-MCNC: 153 MG/DL (ref 70–99)
GRAM STN SPEC: NORMAL
HAPTOGLOB SERPL-MCNC: <10 MG/DL (ref 30–200)
HCT VFR BLD AUTO: 21.5 % (ref 36–48)
HGB BLD-MCNC: 7.5 G/DL (ref 12–16)
IRON SATN MFR SERPL: NORMAL % (ref 15–50)
IRON SERPL-MCNC: 107 UG/DL (ref 37–145)
LDH SERPL L TO P-CCNC: 262 U/L (ref 100–190)
MCH RBC QN AUTO: 33.7 PG (ref 26–34)
MCHC RBC AUTO-ENTMCNC: 34.7 G/DL (ref 31–36)
MCV RBC AUTO: 97.2 FL (ref 80–100)
PHOSPHATE SERPL-MCNC: 3.5 MG/DL (ref 2.5–4.9)
PLATELET # BLD AUTO: 84 K/UL (ref 135–450)
PMV BLD AUTO: 6.9 FL (ref 5–10.5)
POTASSIUM SERPL-SCNC: 4.7 MMOL/L (ref 3.5–5.1)
PROT SERPL-MCNC: 4.9 G/DL (ref 6.4–8.2)
RBC # BLD AUTO: 2.21 M/UL (ref 4–5.2)
SODIUM SERPL-SCNC: 119 MMOL/L (ref 136–145)
SODIUM SERPL-SCNC: 120 MMOL/L (ref 136–145)
SODIUM SERPL-SCNC: 122 MMOL/L (ref 136–145)
SODIUM SERPL-SCNC: 124 MMOL/L (ref 136–145)
TIBC SERPL-MCNC: NORMAL UG/DL (ref 260–445)
TRANSFERRIN SERPL-MCNC: 87 MG/DL (ref 200–360)
WBC # BLD AUTO: 12.3 K/UL (ref 4–11)

## 2024-02-08 PROCEDURE — 80076 HEPATIC FUNCTION PANEL: CPT

## 2024-02-08 PROCEDURE — 85027 COMPLETE CBC AUTOMATED: CPT

## 2024-02-08 PROCEDURE — 36415 COLL VENOUS BLD VENIPUNCTURE: CPT

## 2024-02-08 PROCEDURE — 6370000000 HC RX 637 (ALT 250 FOR IP): Performed by: NURSE PRACTITIONER

## 2024-02-08 PROCEDURE — 2060000000 HC ICU INTERMEDIATE R&B

## 2024-02-08 PROCEDURE — 80069 RENAL FUNCTION PANEL: CPT

## 2024-02-08 PROCEDURE — 89051 BODY FLUID CELL COUNT: CPT

## 2024-02-08 PROCEDURE — 6370000000 HC RX 637 (ALT 250 FOR IP): Performed by: INTERNAL MEDICINE

## 2024-02-08 PROCEDURE — 2580000003 HC RX 258: Performed by: NURSE PRACTITIONER

## 2024-02-08 PROCEDURE — 84295 ASSAY OF SERUM SODIUM: CPT

## 2024-02-08 RX ORDER — OXYMETAZOLINE HYDROCHLORIDE 0.05 G/100ML
2 SPRAY NASAL 2 TIMES DAILY PRN
Status: DISCONTINUED | OUTPATIENT
Start: 2024-02-08 | End: 2024-02-11 | Stop reason: HOSPADM

## 2024-02-08 RX ORDER — TOLVAPTAN 15 MG/1
15 TABLET ORAL ONCE
Status: COMPLETED | OUTPATIENT
Start: 2024-02-08 | End: 2024-02-08

## 2024-02-08 RX ADMIN — MIDODRINE HYDROCHLORIDE 5 MG: 5 TABLET ORAL at 16:58

## 2024-02-08 RX ADMIN — Medication 15 G: at 20:42

## 2024-02-08 RX ADMIN — PANTOPRAZOLE SODIUM 40 MG: 40 TABLET, DELAYED RELEASE ORAL at 06:18

## 2024-02-08 RX ADMIN — SODIUM CHLORIDE 1 G: 1 TABLET ORAL at 20:42

## 2024-02-08 RX ADMIN — TRAMADOL HYDROCHLORIDE 50 MG: 50 TABLET ORAL at 02:06

## 2024-02-08 RX ADMIN — Medication 15 G: at 11:10

## 2024-02-08 RX ADMIN — FERROUS SULFATE TAB 325 MG (65 MG ELEMENTAL FE) 325 MG: 325 (65 FE) TAB at 07:54

## 2024-02-08 RX ADMIN — DEXTROAMPHETAMINE SACCHARATE, AMPHETAMINE ASPARTATE MONOHYDRATE, DEXTROAMPHETAMINE SULFATE AND AMPHETAMINE SULFATE 20 MG: 2.5; 2.5; 2.5; 2.5 CAPSULE, EXTENDED RELEASE ORAL at 07:54

## 2024-02-08 RX ADMIN — LACTULOSE 20 G: 20 SOLUTION ORAL at 20:42

## 2024-02-08 RX ADMIN — RIFAXIMIN 550 MG: 550 TABLET ORAL at 20:45

## 2024-02-08 RX ADMIN — SODIUM CHLORIDE 1 G: 1 TABLET ORAL at 12:58

## 2024-02-08 RX ADMIN — TOLVAPTAN 15 MG: 15 TABLET ORAL at 12:58

## 2024-02-08 RX ADMIN — SODIUM CHLORIDE 1 G: 1 TABLET ORAL at 07:54

## 2024-02-08 RX ADMIN — MIDODRINE HYDROCHLORIDE 5 MG: 5 TABLET ORAL at 07:54

## 2024-02-08 RX ADMIN — Medication 2 SPRAY: at 21:42

## 2024-02-08 RX ADMIN — RIFAXIMIN 550 MG: 550 TABLET ORAL at 07:54

## 2024-02-08 RX ADMIN — SODIUM CHLORIDE, PRESERVATIVE FREE 10 ML: 5 INJECTION INTRAVENOUS at 20:43

## 2024-02-08 RX ADMIN — LACTULOSE 20 G: 20 SOLUTION ORAL at 12:58

## 2024-02-08 RX ADMIN — Medication 1 SPRAY: at 21:43

## 2024-02-08 RX ADMIN — LACTULOSE 20 G: 20 SOLUTION ORAL at 07:54

## 2024-02-08 RX ADMIN — MELATONIN TAB 3 MG 3 MG: 3 TAB at 20:42

## 2024-02-08 RX ADMIN — POLYVINYL ALCOHOL, POVIDONE 1 DROP: 14; 6 SOLUTION/ DROPS OPHTHALMIC at 19:39

## 2024-02-08 RX ADMIN — SODIUM CHLORIDE, PRESERVATIVE FREE 10 ML: 5 INJECTION INTRAVENOUS at 07:54

## 2024-02-08 RX ADMIN — POLYVINYL ALCOHOL, POVIDONE 1 DROP: 14; 6 SOLUTION/ DROPS OPHTHALMIC at 02:13

## 2024-02-08 RX ADMIN — MIDODRINE HYDROCHLORIDE 5 MG: 5 TABLET ORAL at 11:10

## 2024-02-08 RX ADMIN — FOLIC ACID 1 MG: 1 TABLET ORAL at 07:54

## 2024-02-08 NOTE — PLAN OF CARE
Problem: Discharge Planning  Goal: Discharge to home or other facility with appropriate resources  2/8/2024 0847 by Mariza Kaur, RN  Outcome: Progressing     Problem: ABCDS Injury Assessment  Goal: Absence of physical injury  2/8/2024 0847 by Mariza Kaur, RN  Outcome: Progressing     Problem: Gastrointestinal - Adult  Goal: Maintains or returns to baseline bowel function  2/8/2024 0847 by Mariza Kaur, RN  Outcome: Progressing     Problem: Gastrointestinal - Adult  Goal: Maintains adequate nutritional intake  2/8/2024 0847 by Mariza Kaur, RN  Outcome: Progressing

## 2024-02-08 NOTE — PLAN OF CARE
Problem: Discharge Planning  Goal: Discharge to home or other facility with appropriate resources  2/8/2024 0141 by Milena Escalante, RN  Outcome: Progressing  Flowsheets  Taken 2/8/2024 0015  Discharge to home or other facility with appropriate resources:   Identify barriers to discharge with patient and caregiver   Arrange for needed discharge resources and transportation as appropriate   Identify discharge learning needs (meds, wound care, etc)   Refer to discharge planning if patient needs post-hospital services based on physician order or complex needs related to functional status, cognitive ability or social support system  Taken 2/7/2024 2123  Discharge to home or other facility with appropriate resources:   Identify barriers to discharge with patient and caregiver   Arrange for needed discharge resources and transportation as appropriate   Identify discharge learning needs (meds, wound care, etc)   Refer to discharge planning if patient needs post-hospital services based on physician order or complex needs related to functional status, cognitive ability or social support system  Taken 2/7/2024 1907  Discharge to home or other facility with appropriate resources:   Identify barriers to discharge with patient and caregiver   Arrange for needed discharge resources and transportation as appropriate   Identify discharge learning needs (meds, wound care, etc)   Refer to discharge planning if patient needs post-hospital services based on physician order or complex needs related to functional status, cognitive ability or social support system  2/7/2024 1427 by Mariza Kaur, RN  Outcome: Progressing     Problem: ABCDS Injury Assessment  Goal: Absence of physical injury  2/8/2024 0141 by Milena Escalante, RN  Outcome: Progressing  2/7/2024 1427 by Mariza Kaur, RN  Outcome: Progressing     Problem: Gastrointestinal - Adult  Goal: Maintains or returns to baseline bowel function  2/8/2024 0141 by Milena Escalante,

## 2024-02-08 NOTE — CONSULTS
ONCOLOGY HEMATOLOGY CARE PROGRESS NOTE      SUBJECTIVE:    38-year-old lady has been recently diagnosed with decompensated cirrhosis.  She is hospitalized with hyponatremia    Hematology consult has been called for evaluation of anemia.    The patient was evaluated by Dr. Sunshine on 1/29/2024.  Anemia was thought to be due to liver disease and hypersplenism.    Hemoglobin was 5.9 on 2/7/2024.  She received PRBCs with improvement in hemoglobin to 7.1.    She does not report external bleeding or black stools      ROS:         OBJECTIVE        Physical    VITALS:  Patient Vitals for the past 24 hrs:   BP Temp Temp src Pulse Resp SpO2 Weight   02/07/24 1900 (!) 107/54 97.8 °F (36.6 °C) Temporal 96 18 100 % --   02/07/24 1645 113/62 97.5 °F (36.4 °C) Temporal 97 18 100 % --   02/07/24 1215 (!) 102/48 98.2 °F (36.8 °C) -- 99 17 99 % --   02/07/24 1212 (!) 102/48 98.2 °F (36.8 °C) Oral 99 17 99 % --   02/07/24 1205 (!) 102/48 98.2 °F (36.8 °C) -- 99 17 99 % --   02/07/24 0957 -- -- -- 98 -- -- --   02/07/24 0900 (!) 106/53 97.9 °F (36.6 °C) Oral 92 18 100 % --   02/07/24 0845 (!) 106/58 97.9 °F (36.6 °C) Oral 92 18 100 % 91.5 kg (201 lb 12.8 oz)   02/07/24 0829 103/66 98.6 °F (37 °C) Oral 95 18 100 % --   02/07/24 0733 (!) 106/48 98 °F (36.7 °C) Oral 95 -- 98 % --   02/07/24 0350 -- -- -- -- -- -- 87.3 kg (192 lb 6.4 oz)   02/07/24 0331 (!) 95/56 97.6 °F (36.4 °C) Axillary 94 -- 100 % --   02/07/24 0002 -- -- -- -- 16 -- --   02/06/24 2332 -- -- -- -- 16 -- --   02/06/24 2324 (!) 94/55 97.5 °F (36.4 °C) Axillary (!) 101 18 98 % --       24HR INTAKE/OUTPUT:    Intake/Output Summary (Last 24 hours) at 2/7/2024 2131  Last data filed at 2/7/2024 2123  Gross per 24 hour   Intake 1345.33 ml   Output 1210 ml   Net 135.33 ml     Conscious alert and oriented.  Icterus is present pallor is present.  Neck is supple  Respiratory efforts are normal.  Abdomen is distended ascites is 
                Gastroenterology Consult Note        Patient: Angélica Gonzalez  : 1985  Acct#:      Date:  2024    Subjective:       History of Present Illness  Patient is a 38 y.o.  female admitted with Hyperbilirubinemia [E80.6]  Dizziness [R42]  Hyponatremia [E87.1]  Hyponatremia syndrome [E87.1]  Acute liver failure without hepatic coma [K72.00] who is seen in consult for anemia, cirrhosis.     History of alcohol cirrhosis.  She has seen Dr. Burns outpatient.  No longer drinking.  Cirrhosis is decompensated by hepatic encephalopathy.  She has been on lactulose for this at home.  Xifaxan was too expensive.  She has recurrent ascites requiring frequent paracenteses.  She has hyponatremia and is followed by nephrology.  EGD 2023 with small esophageal varices.  She had an office visit with Dr. Alexander at  in late January who increased midodrine to 10 mg tid for hypotension, dc'd carvedilol, rec cont xifaxan and set f/u 2023 at which time off etoh and completed rehab for 12 weeks to then be considered for transplant listing.     She presented to the ER here 3 days ago with increased swelling in the legs and abdomen as well as mild confusion.  She also was taking her lactulose at home but only had 1 bowel movement that day.  Sodium was 118 in the ER and she was admitted.  Ammonia level was normal.  She was anemic.  Denies any black or bloody bowel movements.  She did have abdominal bloating but this improved with paracentesis on .  No nausea or vomiting.      Past Medical History:   Diagnosis Date    ADHD     Cirrhosis (HCC)     Dyslexia     ETOH abuse     Hepatic cirrhosis (HCC)     History of blood transfusion       Past Surgical History:   Procedure Laterality Date    PARACENTESIS  10/14/2023    had several: most recent Friday the     UPPER GASTROINTESTINAL ENDOSCOPY N/A 11/15/2023    EGD BIOPSY performed by Jone Burns MD at [facility] ENDOSCOPY      Past Endoscopic 
            ======================================================================  Please note that this chart entry has been generated using voice recognition software, mainly.  So please excuse brevity and/or typos.  While every effort and attempts have been made to ensure the accuracy of this automated transcription, some errors may have occurred; and certain words and phrases in transcription may not be entered as intended.  However, inadvertent computerized transcription errors may be present.  So please contact us if any clarification needed.

## 2024-02-09 ENCOUNTER — APPOINTMENT (OUTPATIENT)
Dept: INTERVENTIONAL RADIOLOGY/VASCULAR | Age: 39
DRG: 433 | End: 2024-02-09
Payer: COMMERCIAL

## 2024-02-09 LAB
ALBUMIN SERPL-MCNC: 3.5 G/DL (ref 3.4–5)
ALP SERPL-CCNC: 191 U/L (ref 40–129)
ALT SERPL-CCNC: 30 U/L (ref 10–40)
ANION GAP SERPL CALCULATED.3IONS-SCNC: 11 MMOL/L (ref 3–16)
ANISOCYTOSIS BLD QL SMEAR: ABNORMAL
APPEARANCE FLUID: NORMAL
AST SERPL-CCNC: 66 U/L (ref 15–37)
BASOPHILS # BLD: 0 K/UL (ref 0–0.2)
BASOPHILS NFR BLD: 0 %
BDY FLUID QUALITY: NORMAL
BILIRUB DIRECT SERPL-MCNC: 7.2 MG/DL (ref 0–0.3)
BILIRUB INDIRECT SERPL-MCNC: 10.3 MG/DL (ref 0–1)
BILIRUB SERPL-MCNC: 17.5 MG/DL (ref 0–1)
BUN SERPL-MCNC: 20 MG/DL (ref 7–20)
BURR CELLS BLD QL SMEAR: ABNORMAL
CALCIUM SERPL-MCNC: 8.3 MG/DL (ref 8.3–10.6)
CELL COUNT FLUID TYPE: NORMAL
CHLORIDE SERPL-SCNC: 93 MMOL/L (ref 99–110)
CO2 SERPL-SCNC: 18 MMOL/L (ref 21–32)
COLOR FLUID: NORMAL
CREAT SERPL-MCNC: <0.5 MG/DL (ref 0.6–1.1)
DEPRECATED RDW RBC AUTO: 26 % (ref 12.4–15.4)
EOSINOPHIL # BLD: 0.4 K/UL (ref 0–0.6)
EOSINOPHIL NFR BLD: 3 %
GFR SERPLBLD CREATININE-BSD FMLA CKD-EPI: >60 ML/MIN/{1.73_M2}
HCT VFR BLD AUTO: 21.9 % (ref 36–48)
HGB BLD-MCNC: 7.8 G/DL (ref 12–16)
INR PPP: 3.05 (ref 0.84–1.16)
LYMPHOCYTES # BLD: 1.2 K/UL (ref 1–5.1)
LYMPHOCYTES NFR BLD: 10 %
LYMPHOCYTES NFR FLD: 24 %
MACROCYTES BLD QL SMEAR: ABNORMAL
MACROPHAGES # FLD: 13 %
MCH RBC QN AUTO: 35 PG (ref 26–34)
MCHC RBC AUTO-ENTMCNC: 35.5 G/DL (ref 31–36)
MCV RBC AUTO: 98.7 FL (ref 80–100)
MESOTHL CELL NFR FLD: 2 %
METAMYELOCYTES NFR BLD MANUAL: 2 %
MONOCYTES # BLD: 1.1 K/UL (ref 0–1.3)
MONOCYTES NFR BLD: 9 %
NEUTROPHIL, FLUID: 61 %
NEUTROPHILS # BLD: 9.5 K/UL (ref 1.7–7.7)
NEUTROPHILS NFR BLD: 76 %
NEUTS VAC BLD QL SMEAR: PRESENT
NUC CELL # FLD: 865 /CUMM
PHOSPHATE SERPL-MCNC: 2.9 MG/DL (ref 2.5–4.9)
PLATELET # BLD AUTO: 89 K/UL (ref 135–450)
PLATELET BLD QL SMEAR: ABNORMAL
PMV BLD AUTO: 7 FL (ref 5–10.5)
POIKILOCYTOSIS BLD QL SMEAR: ABNORMAL
POLYCHROMASIA BLD QL SMEAR: ABNORMAL
POTASSIUM SERPL-SCNC: 5.1 MMOL/L (ref 3.5–5.1)
PROT SERPL-MCNC: 5.1 G/DL (ref 6.4–8.2)
PROTHROMBIN TIME: 31.4 SEC (ref 11.5–14.8)
RBC # BLD AUTO: 2.22 M/UL (ref 4–5.2)
RBC FLUID: NORMAL /CUMM
SCHISTOCYTES BLD QL SMEAR: ABNORMAL
SLIDE REVIEW: ABNORMAL
SODIUM SERPL-SCNC: 120 MMOL/L (ref 136–145)
SODIUM SERPL-SCNC: 122 MMOL/L (ref 136–145)
SODIUM SERPL-SCNC: 126 MMOL/L (ref 136–145)
SODIUM SERPL-SCNC: 126 MMOL/L (ref 136–145)
TARGETS BLD QL SMEAR: ABNORMAL
TOTAL CELLS COUNTED FLD: 100
WBC # BLD AUTO: 12.2 K/UL (ref 4–11)

## 2024-02-09 PROCEDURE — 6360000002 HC RX W HCPCS: Performed by: INTERNAL MEDICINE

## 2024-02-09 PROCEDURE — 84157 ASSAY OF PROTEIN OTHER: CPT

## 2024-02-09 PROCEDURE — 82042 OTHER SOURCE ALBUMIN QUAN EA: CPT

## 2024-02-09 PROCEDURE — 6370000000 HC RX 637 (ALT 250 FOR IP): Performed by: INTERNAL MEDICINE

## 2024-02-09 PROCEDURE — 2060000000 HC ICU INTERMEDIATE R&B

## 2024-02-09 PROCEDURE — 85610 PROTHROMBIN TIME: CPT

## 2024-02-09 PROCEDURE — 84295 ASSAY OF SERUM SODIUM: CPT

## 2024-02-09 PROCEDURE — 6370000000 HC RX 637 (ALT 250 FOR IP): Performed by: NURSE PRACTITIONER

## 2024-02-09 PROCEDURE — 87205 SMEAR GRAM STAIN: CPT

## 2024-02-09 PROCEDURE — 87070 CULTURE OTHR SPECIMN AEROBIC: CPT

## 2024-02-09 PROCEDURE — 36415 COLL VENOUS BLD VENIPUNCTURE: CPT

## 2024-02-09 PROCEDURE — 49083 ABD PARACENTESIS W/IMAGING: CPT

## 2024-02-09 PROCEDURE — 6360000002 HC RX W HCPCS: Performed by: NURSE PRACTITIONER

## 2024-02-09 PROCEDURE — 2580000003 HC RX 258: Performed by: NURSE PRACTITIONER

## 2024-02-09 PROCEDURE — 2500000003 HC RX 250 WO HCPCS: Performed by: STUDENT IN AN ORGANIZED HEALTH CARE EDUCATION/TRAINING PROGRAM

## 2024-02-09 PROCEDURE — 80069 RENAL FUNCTION PANEL: CPT

## 2024-02-09 PROCEDURE — P9047 ALBUMIN (HUMAN), 25%, 50ML: HCPCS | Performed by: PHYSICIAN ASSISTANT

## 2024-02-09 PROCEDURE — 85025 COMPLETE CBC W/AUTO DIFF WBC: CPT

## 2024-02-09 PROCEDURE — 6360000002 HC RX W HCPCS: Performed by: PHYSICIAN ASSISTANT

## 2024-02-09 PROCEDURE — C1729 CATH, DRAINAGE: HCPCS

## 2024-02-09 PROCEDURE — 80076 HEPATIC FUNCTION PANEL: CPT

## 2024-02-09 RX ORDER — LIDOCAINE HYDROCHLORIDE 10 MG/ML
10 INJECTION, SOLUTION EPIDURAL; INFILTRATION; INTRACAUDAL; PERINEURAL ONCE
Status: COMPLETED | OUTPATIENT
Start: 2024-02-09 | End: 2024-02-09

## 2024-02-09 RX ORDER — ALBUMIN (HUMAN) 12.5 G/50ML
50 SOLUTION INTRAVENOUS ONCE
Status: COMPLETED | OUTPATIENT
Start: 2024-02-09 | End: 2024-02-09

## 2024-02-09 RX ORDER — FUROSEMIDE 10 MG/ML
20 INJECTION INTRAMUSCULAR; INTRAVENOUS 3 TIMES DAILY
Status: DISCONTINUED | OUTPATIENT
Start: 2024-02-09 | End: 2024-02-10

## 2024-02-09 RX ADMIN — DEXTROAMPHETAMINE SACCHARATE, AMPHETAMINE ASPARTATE MONOHYDRATE, DEXTROAMPHETAMINE SULFATE AND AMPHETAMINE SULFATE 20 MG: 2.5; 2.5; 2.5; 2.5 CAPSULE, EXTENDED RELEASE ORAL at 11:44

## 2024-02-09 RX ADMIN — MIDODRINE HYDROCHLORIDE 5 MG: 5 TABLET ORAL at 16:37

## 2024-02-09 RX ADMIN — ONDANSETRON 4 MG: 2 INJECTION INTRAMUSCULAR; INTRAVENOUS at 08:55

## 2024-02-09 RX ADMIN — ALBUMIN (HUMAN) 50 G: 0.25 INJECTION, SOLUTION INTRAVENOUS at 14:24

## 2024-02-09 RX ADMIN — PANTOPRAZOLE SODIUM 40 MG: 40 TABLET, DELAYED RELEASE ORAL at 06:02

## 2024-02-09 RX ADMIN — SODIUM CHLORIDE 1 G: 1 TABLET ORAL at 16:37

## 2024-02-09 RX ADMIN — POLYVINYL ALCOHOL, POVIDONE 1 DROP: 14; 6 SOLUTION/ DROPS OPHTHALMIC at 07:43

## 2024-02-09 RX ADMIN — Medication 15 G: at 21:31

## 2024-02-09 RX ADMIN — SODIUM CHLORIDE, PRESERVATIVE FREE 10 ML: 5 INJECTION INTRAVENOUS at 08:55

## 2024-02-09 RX ADMIN — RIFAXIMIN 550 MG: 550 TABLET ORAL at 11:44

## 2024-02-09 RX ADMIN — MIDODRINE HYDROCHLORIDE 5 MG: 5 TABLET ORAL at 11:44

## 2024-02-09 RX ADMIN — LACTULOSE 20 G: 20 SOLUTION ORAL at 11:46

## 2024-02-09 RX ADMIN — LACTULOSE 20 G: 20 SOLUTION ORAL at 16:37

## 2024-02-09 RX ADMIN — FERROUS SULFATE TAB 325 MG (65 MG ELEMENTAL FE) 325 MG: 325 (65 FE) TAB at 07:51

## 2024-02-09 RX ADMIN — Medication 15 G: at 11:47

## 2024-02-09 RX ADMIN — SODIUM CHLORIDE, PRESERVATIVE FREE 10 ML: 5 INJECTION INTRAVENOUS at 21:31

## 2024-02-09 RX ADMIN — LACTULOSE 20 G: 20 SOLUTION ORAL at 21:31

## 2024-02-09 RX ADMIN — SODIUM CHLORIDE 1 G: 1 TABLET ORAL at 11:44

## 2024-02-09 RX ADMIN — TRAMADOL HYDROCHLORIDE 50 MG: 50 TABLET ORAL at 07:51

## 2024-02-09 RX ADMIN — MELATONIN TAB 3 MG 3 MG: 3 TAB at 21:31

## 2024-02-09 RX ADMIN — LIDOCAINE HYDROCHLORIDE 10 ML: 10 INJECTION, SOLUTION EPIDURAL; INFILTRATION; INTRACAUDAL; PERINEURAL at 09:54

## 2024-02-09 RX ADMIN — MIDODRINE HYDROCHLORIDE 5 MG: 5 TABLET ORAL at 07:51

## 2024-02-09 RX ADMIN — FOLIC ACID 1 MG: 1 TABLET ORAL at 11:44

## 2024-02-09 RX ADMIN — SODIUM CHLORIDE 1 G: 1 TABLET ORAL at 21:31

## 2024-02-09 RX ADMIN — RIFAXIMIN 550 MG: 550 TABLET ORAL at 21:31

## 2024-02-09 RX ADMIN — FUROSEMIDE 20 MG: 10 INJECTION, SOLUTION INTRAMUSCULAR; INTRAVENOUS at 21:31

## 2024-02-09 RX ADMIN — Medication 2 SPRAY: at 21:39

## 2024-02-09 RX ADMIN — FUROSEMIDE 20 MG: 10 INJECTION, SOLUTION INTRAMUSCULAR; INTRAVENOUS at 14:01

## 2024-02-09 RX ADMIN — Medication 1 SPRAY: at 07:42

## 2024-02-10 LAB
ALBUMIN SERPL-MCNC: 3.7 G/DL (ref 3.4–5)
ALP SERPL-CCNC: 192 U/L (ref 40–129)
ALT SERPL-CCNC: 25 U/L (ref 10–40)
ANION GAP SERPL CALCULATED.3IONS-SCNC: 10 MMOL/L (ref 3–16)
AST SERPL-CCNC: 57 U/L (ref 15–37)
BASOPHILS # BLD: 0 K/UL (ref 0–0.2)
BASOPHILS NFR BLD: 0.2 %
BILIRUB DIRECT SERPL-MCNC: 6.3 MG/DL (ref 0–0.3)
BILIRUB INDIRECT SERPL-MCNC: 9.3 MG/DL (ref 0–1)
BILIRUB SERPL-MCNC: 15.6 MG/DL (ref 0–1)
BUN SERPL-MCNC: 23 MG/DL (ref 7–20)
CALCIUM SERPL-MCNC: 8.3 MG/DL (ref 8.3–10.6)
CHLORIDE SERPL-SCNC: 92 MMOL/L (ref 99–110)
CO2 SERPL-SCNC: 23 MMOL/L (ref 21–32)
CREAT SERPL-MCNC: <0.5 MG/DL (ref 0.6–1.1)
DEPRECATED RDW RBC AUTO: 25.7 % (ref 12.4–15.4)
EOSINOPHIL # BLD: 0.3 K/UL (ref 0–0.6)
EOSINOPHIL NFR BLD: 2.7 %
GFR SERPLBLD CREATININE-BSD FMLA CKD-EPI: >60 ML/MIN/{1.73_M2}
GLUCOSE SERPL-MCNC: 125 MG/DL (ref 70–99)
HCT VFR BLD AUTO: 19.9 % (ref 36–48)
HCT VFR BLD AUTO: 22.5 % (ref 36–48)
HGB BLD-MCNC: 7.1 G/DL (ref 12–16)
HGB BLD-MCNC: 7.8 G/DL (ref 12–16)
LYMPHOCYTES # BLD: 1 K/UL (ref 1–5.1)
LYMPHOCYTES NFR BLD: 10.8 %
MAGNESIUM SERPL-MCNC: 2 MG/DL (ref 1.8–2.4)
MCH RBC QN AUTO: 35.2 PG (ref 26–34)
MCHC RBC AUTO-ENTMCNC: 35.5 G/DL (ref 31–36)
MCV RBC AUTO: 99.2 FL (ref 80–100)
MONOCYTES # BLD: 0.9 K/UL (ref 0–1.3)
MONOCYTES NFR BLD: 10 %
NEUTROPHILS # BLD: 7.2 K/UL (ref 1.7–7.7)
NEUTROPHILS NFR BLD: 76.3 %
PHOSPHATE SERPL-MCNC: 3 MG/DL (ref 2.5–4.9)
PLATELET # BLD AUTO: 84 K/UL (ref 135–450)
PMV BLD AUTO: 7.1 FL (ref 5–10.5)
POTASSIUM SERPL-SCNC: 4.8 MMOL/L (ref 3.5–5.1)
PROT FLD-MCNC: 1.1 G/DL
PROT SERPL-MCNC: 5 G/DL (ref 6.4–8.2)
PROTHROMBIN TIME: 32.5 SEC (ref 11.5–14.8)
RBC # BLD AUTO: 2 M/UL (ref 4–5.2)
SODIUM SERPL-SCNC: 122 MMOL/L (ref 136–145)
SODIUM SERPL-SCNC: 124 MMOL/L (ref 136–145)
SODIUM SERPL-SCNC: 125 MMOL/L (ref 136–145)
WBC # BLD AUTO: 9.4 K/UL (ref 4–11)

## 2024-02-10 PROCEDURE — 6360000002 HC RX W HCPCS: Performed by: INTERNAL MEDICINE

## 2024-02-10 PROCEDURE — 6370000000 HC RX 637 (ALT 250 FOR IP): Performed by: NURSE PRACTITIONER

## 2024-02-10 PROCEDURE — 80076 HEPATIC FUNCTION PANEL: CPT

## 2024-02-10 PROCEDURE — 2060000000 HC ICU INTERMEDIATE R&B

## 2024-02-10 PROCEDURE — 85025 COMPLETE CBC W/AUTO DIFF WBC: CPT

## 2024-02-10 PROCEDURE — 84295 ASSAY OF SERUM SODIUM: CPT

## 2024-02-10 PROCEDURE — 80069 RENAL FUNCTION PANEL: CPT

## 2024-02-10 PROCEDURE — 6360000002 HC RX W HCPCS: Performed by: NURSE PRACTITIONER

## 2024-02-10 PROCEDURE — 83735 ASSAY OF MAGNESIUM: CPT

## 2024-02-10 PROCEDURE — 85014 HEMATOCRIT: CPT

## 2024-02-10 PROCEDURE — 6370000000 HC RX 637 (ALT 250 FOR IP): Performed by: INTERNAL MEDICINE

## 2024-02-10 PROCEDURE — 85018 HEMOGLOBIN: CPT

## 2024-02-10 PROCEDURE — 36415 COLL VENOUS BLD VENIPUNCTURE: CPT

## 2024-02-10 PROCEDURE — C9113 INJ PANTOPRAZOLE SODIUM, VIA: HCPCS | Performed by: INTERNAL MEDICINE

## 2024-02-10 PROCEDURE — 2580000003 HC RX 258: Performed by: NURSE PRACTITIONER

## 2024-02-10 PROCEDURE — 85610 PROTHROMBIN TIME: CPT

## 2024-02-10 RX ORDER — FUROSEMIDE 10 MG/ML
40 INJECTION INTRAMUSCULAR; INTRAVENOUS 3 TIMES DAILY
Status: COMPLETED | OUTPATIENT
Start: 2024-02-10 | End: 2024-02-10

## 2024-02-10 RX ORDER — PANTOPRAZOLE SODIUM 40 MG/10ML
40 INJECTION, POWDER, LYOPHILIZED, FOR SOLUTION INTRAVENOUS 2 TIMES DAILY
Status: DISCONTINUED | OUTPATIENT
Start: 2024-02-10 | End: 2024-02-11 | Stop reason: HOSPADM

## 2024-02-10 RX ADMIN — POLYVINYL ALCOHOL, POVIDONE 1 DROP: 14; 6 SOLUTION/ DROPS OPHTHALMIC at 15:27

## 2024-02-10 RX ADMIN — RIFAXIMIN 550 MG: 550 TABLET ORAL at 07:52

## 2024-02-10 RX ADMIN — Medication 15 G: at 07:49

## 2024-02-10 RX ADMIN — Medication 2 SPRAY: at 20:33

## 2024-02-10 RX ADMIN — Medication 15 G: at 20:23

## 2024-02-10 RX ADMIN — PANTOPRAZOLE SODIUM 40 MG: 40 INJECTION, POWDER, FOR SOLUTION INTRAVENOUS at 06:47

## 2024-02-10 RX ADMIN — LACTULOSE 20 G: 20 SOLUTION ORAL at 07:49

## 2024-02-10 RX ADMIN — DEXTROAMPHETAMINE SACCHARATE, AMPHETAMINE ASPARTATE MONOHYDRATE, DEXTROAMPHETAMINE SULFATE AND AMPHETAMINE SULFATE 20 MG: 2.5; 2.5; 2.5; 2.5 CAPSULE, EXTENDED RELEASE ORAL at 07:48

## 2024-02-10 RX ADMIN — FUROSEMIDE 40 MG: 10 INJECTION, SOLUTION INTRAMUSCULAR; INTRAVENOUS at 13:17

## 2024-02-10 RX ADMIN — ONDANSETRON 4 MG: 2 INJECTION INTRAMUSCULAR; INTRAVENOUS at 23:17

## 2024-02-10 RX ADMIN — MIDODRINE HYDROCHLORIDE 5 MG: 5 TABLET ORAL at 07:49

## 2024-02-10 RX ADMIN — SODIUM CHLORIDE 1 G: 1 TABLET ORAL at 07:49

## 2024-02-10 RX ADMIN — SODIUM CHLORIDE 1 G: 1 TABLET ORAL at 20:23

## 2024-02-10 RX ADMIN — MELATONIN TAB 3 MG 3 MG: 3 TAB at 20:23

## 2024-02-10 RX ADMIN — SODIUM CHLORIDE, PRESERVATIVE FREE 10 ML: 5 INJECTION INTRAVENOUS at 13:17

## 2024-02-10 RX ADMIN — SODIUM CHLORIDE 1 G: 1 TABLET ORAL at 13:17

## 2024-02-10 RX ADMIN — POLYVINYL ALCOHOL, POVIDONE 1 DROP: 14; 6 SOLUTION/ DROPS OPHTHALMIC at 20:34

## 2024-02-10 RX ADMIN — RIFAXIMIN 550 MG: 550 TABLET ORAL at 20:26

## 2024-02-10 RX ADMIN — FOLIC ACID 1 MG: 1 TABLET ORAL at 07:49

## 2024-02-10 RX ADMIN — MIDODRINE HYDROCHLORIDE 5 MG: 5 TABLET ORAL at 17:19

## 2024-02-10 RX ADMIN — MIDODRINE HYDROCHLORIDE 5 MG: 5 TABLET ORAL at 11:19

## 2024-02-10 RX ADMIN — PANTOPRAZOLE SODIUM 40 MG: 40 INJECTION, POWDER, FOR SOLUTION INTRAVENOUS at 20:23

## 2024-02-10 RX ADMIN — FUROSEMIDE 40 MG: 10 INJECTION, SOLUTION INTRAMUSCULAR; INTRAVENOUS at 20:23

## 2024-02-10 RX ADMIN — POLYVINYL ALCOHOL, POVIDONE 1 DROP: 14; 6 SOLUTION/ DROPS OPHTHALMIC at 05:05

## 2024-02-10 RX ADMIN — FUROSEMIDE 40 MG: 10 INJECTION, SOLUTION INTRAMUSCULAR; INTRAVENOUS at 07:48

## 2024-02-10 RX ADMIN — LACTULOSE 20 G: 20 SOLUTION ORAL at 20:23

## 2024-02-10 RX ADMIN — SODIUM CHLORIDE, PRESERVATIVE FREE 10 ML: 5 INJECTION INTRAVENOUS at 20:33

## 2024-02-10 RX ADMIN — SODIUM CHLORIDE, PRESERVATIVE FREE 10 ML: 5 INJECTION INTRAVENOUS at 07:49

## 2024-02-10 RX ADMIN — PANTOPRAZOLE SODIUM 40 MG: 40 TABLET, DELAYED RELEASE ORAL at 05:07

## 2024-02-10 RX ADMIN — TRAMADOL HYDROCHLORIDE 50 MG: 50 TABLET ORAL at 05:09

## 2024-02-10 RX ADMIN — SODIUM CHLORIDE, PRESERVATIVE FREE 10 ML: 5 INJECTION INTRAVENOUS at 23:17

## 2024-02-10 RX ADMIN — FERROUS SULFATE TAB 325 MG (65 MG ELEMENTAL FE) 325 MG: 325 (65 FE) TAB at 07:49

## 2024-02-10 RX ADMIN — LACTULOSE 20 G: 20 SOLUTION ORAL at 13:17

## 2024-02-10 NOTE — PLAN OF CARE
Problem: Discharge Planning  Goal: Discharge to home or other facility with appropriate resources  Outcome: Progressing     Problem: ABCDS Injury Assessment  Goal: Absence of physical injury  Outcome: Progressing     Problem: Gastrointestinal - Adult  Goal: Maintains or returns to baseline bowel function  Outcome: Progressing     Problem: Gastrointestinal - Adult  Goal: Maintains adequate nutritional intake  Outcome: Progressing     Problem: Hematologic - Adult  Goal: Maintains hematologic stability  Outcome: Progressing

## 2024-02-11 VITALS
BODY MASS INDEX: 32.3 KG/M2 | WEIGHT: 201 LBS | TEMPERATURE: 98 F | HEART RATE: 94 BPM | OXYGEN SATURATION: 99 % | HEIGHT: 66 IN | RESPIRATION RATE: 18 BRPM | SYSTOLIC BLOOD PRESSURE: 110 MMHG | DIASTOLIC BLOOD PRESSURE: 37 MMHG

## 2024-02-11 DIAGNOSIS — E87.1 HYPONATREMIA: Primary | ICD-10-CM

## 2024-02-11 LAB
ALBUMIN FLD-MCNC: 0.9 G/DL
ALBUMIN SERPL-MCNC: 3.4 G/DL (ref 3.4–5)
ALP SERPL-CCNC: 202 U/L (ref 40–129)
ALT SERPL-CCNC: 22 U/L (ref 10–40)
ANION GAP SERPL CALCULATED.3IONS-SCNC: 10 MMOL/L (ref 3–16)
ANION GAP SERPL CALCULATED.3IONS-SCNC: 10 MMOL/L (ref 3–16)
AST SERPL-CCNC: 57 U/L (ref 15–37)
BASOPHILS # BLD: 0 K/UL (ref 0–0.2)
BASOPHILS NFR BLD: 0.3 %
BILIRUB DIRECT SERPL-MCNC: 5.9 MG/DL (ref 0–0.3)
BILIRUB INDIRECT SERPL-MCNC: 9.3 MG/DL (ref 0–1)
BILIRUB SERPL-MCNC: 15.2 MG/DL (ref 0–1)
BUN SERPL-MCNC: 30 MG/DL (ref 7–20)
BUN SERPL-MCNC: 36 MG/DL (ref 7–20)
CALCIUM SERPL-MCNC: 8.4 MG/DL (ref 8.3–10.6)
CALCIUM SERPL-MCNC: 8.8 MG/DL (ref 8.3–10.6)
CHLORIDE SERPL-SCNC: 87 MMOL/L (ref 99–110)
CHLORIDE SERPL-SCNC: 93 MMOL/L (ref 99–110)
CO2 SERPL-SCNC: 23 MMOL/L (ref 21–32)
CO2 SERPL-SCNC: 23 MMOL/L (ref 21–32)
CREAT SERPL-MCNC: <0.5 MG/DL (ref 0.6–1.1)
CREAT SERPL-MCNC: <0.5 MG/DL (ref 0.6–1.1)
DEPRECATED RDW RBC AUTO: 25.6 % (ref 12.4–15.4)
EOSINOPHIL # BLD: 0.2 K/UL (ref 0–0.6)
EOSINOPHIL NFR BLD: 2.8 %
GFR SERPLBLD CREATININE-BSD FMLA CKD-EPI: >60 ML/MIN/{1.73_M2}
GFR SERPLBLD CREATININE-BSD FMLA CKD-EPI: >60 ML/MIN/{1.73_M2}
GLUCOSE SERPL-MCNC: 102 MG/DL (ref 70–99)
GLUCOSE SERPL-MCNC: 129 MG/DL (ref 70–99)
HCT VFR BLD AUTO: 19.7 % (ref 36–48)
HCT VFR BLD AUTO: 21 % (ref 36–48)
HGB BLD-MCNC: 7.1 G/DL (ref 12–16)
HGB BLD-MCNC: 7.5 G/DL (ref 12–16)
INR PPP: 3.18 (ref 0.84–1.16)
LYMPHOCYTES # BLD: 1.1 K/UL (ref 1–5.1)
LYMPHOCYTES NFR BLD: 13.1 %
MAGNESIUM SERPL-MCNC: 2 MG/DL (ref 1.8–2.4)
MCH RBC QN AUTO: 35.1 PG (ref 26–34)
MCHC RBC AUTO-ENTMCNC: 36 G/DL (ref 31–36)
MCV RBC AUTO: 97.4 FL (ref 80–100)
MONOCYTES # BLD: 0.9 K/UL (ref 0–1.3)
MONOCYTES NFR BLD: 10.8 %
NEUTROPHILS # BLD: 6.2 K/UL (ref 1.7–7.7)
NEUTROPHILS NFR BLD: 73 %
PHOSPHATE SERPL-MCNC: 3.6 MG/DL (ref 2.5–4.9)
PLATELET # BLD AUTO: 78 K/UL (ref 135–450)
PMV BLD AUTO: 7 FL (ref 5–10.5)
POTASSIUM SERPL-SCNC: 4.7 MMOL/L (ref 3.5–5.1)
POTASSIUM SERPL-SCNC: 5.1 MMOL/L (ref 3.5–5.1)
PROT SERPL-MCNC: 4.8 G/DL (ref 6.4–8.2)
PROTHROMBIN TIME: 32.4 SEC (ref 11.5–14.8)
RBC # BLD AUTO: 2.02 M/UL (ref 4–5.2)
SODIUM SERPL-SCNC: 120 MMOL/L (ref 136–145)
SODIUM SERPL-SCNC: 121 MMOL/L (ref 136–145)
SODIUM SERPL-SCNC: 125 MMOL/L (ref 136–145)
SODIUM SERPL-SCNC: 126 MMOL/L (ref 136–145)
SPECIMEN SOURCE FLD: NORMAL
WBC # BLD AUTO: 8.5 K/UL (ref 4–11)

## 2024-02-11 PROCEDURE — C9113 INJ PANTOPRAZOLE SODIUM, VIA: HCPCS | Performed by: INTERNAL MEDICINE

## 2024-02-11 PROCEDURE — 80076 HEPATIC FUNCTION PANEL: CPT

## 2024-02-11 PROCEDURE — 6360000002 HC RX W HCPCS: Performed by: INTERNAL MEDICINE

## 2024-02-11 PROCEDURE — 6370000000 HC RX 637 (ALT 250 FOR IP): Performed by: NURSE PRACTITIONER

## 2024-02-11 PROCEDURE — 6370000000 HC RX 637 (ALT 250 FOR IP): Performed by: STUDENT IN AN ORGANIZED HEALTH CARE EDUCATION/TRAINING PROGRAM

## 2024-02-11 PROCEDURE — 83735 ASSAY OF MAGNESIUM: CPT

## 2024-02-11 PROCEDURE — 85025 COMPLETE CBC W/AUTO DIFF WBC: CPT

## 2024-02-11 PROCEDURE — 80069 RENAL FUNCTION PANEL: CPT

## 2024-02-11 PROCEDURE — 84295 ASSAY OF SERUM SODIUM: CPT

## 2024-02-11 PROCEDURE — 6360000002 HC RX W HCPCS: Performed by: NURSE PRACTITIONER

## 2024-02-11 PROCEDURE — 6370000000 HC RX 637 (ALT 250 FOR IP): Performed by: INTERNAL MEDICINE

## 2024-02-11 PROCEDURE — 36415 COLL VENOUS BLD VENIPUNCTURE: CPT

## 2024-02-11 PROCEDURE — 85610 PROTHROMBIN TIME: CPT

## 2024-02-11 PROCEDURE — 85014 HEMATOCRIT: CPT

## 2024-02-11 PROCEDURE — 2580000003 HC RX 258: Performed by: NURSE PRACTITIONER

## 2024-02-11 PROCEDURE — 85018 HEMOGLOBIN: CPT

## 2024-02-11 RX ORDER — TORSEMIDE 20 MG/1
20 TABLET ORAL 2 TIMES DAILY
Qty: 90 TABLET | Refills: 2 | Status: SHIPPED | OUTPATIENT
Start: 2024-02-11

## 2024-02-11 RX ORDER — PROCHLORPERAZINE EDISYLATE 5 MG/ML
10 INJECTION INTRAMUSCULAR; INTRAVENOUS EVERY 6 HOURS PRN
Status: DISCONTINUED | OUTPATIENT
Start: 2024-02-11 | End: 2024-02-11 | Stop reason: HOSPADM

## 2024-02-11 RX ORDER — TOLVAPTAN 30 MG/1
30 TABLET ORAL ONCE
Status: COMPLETED | OUTPATIENT
Start: 2024-02-11 | End: 2024-02-11

## 2024-02-11 RX ADMIN — PANTOPRAZOLE SODIUM 40 MG: 40 INJECTION, POWDER, FOR SOLUTION INTRAVENOUS at 08:05

## 2024-02-11 RX ADMIN — SODIUM CHLORIDE, PRESERVATIVE FREE 10 ML: 5 INJECTION INTRAVENOUS at 08:05

## 2024-02-11 RX ADMIN — SODIUM CHLORIDE 1 G: 1 TABLET ORAL at 14:09

## 2024-02-11 RX ADMIN — MIDODRINE HYDROCHLORIDE 5 MG: 5 TABLET ORAL at 11:47

## 2024-02-11 RX ADMIN — FERROUS SULFATE TAB 325 MG (65 MG ELEMENTAL FE) 325 MG: 325 (65 FE) TAB at 08:05

## 2024-02-11 RX ADMIN — Medication 15 G: at 08:05

## 2024-02-11 RX ADMIN — MIDODRINE HYDROCHLORIDE 5 MG: 5 TABLET ORAL at 08:05

## 2024-02-11 RX ADMIN — TOLVAPTAN 30 MG: 30 TABLET ORAL at 07:02

## 2024-02-11 RX ADMIN — TRAMADOL HYDROCHLORIDE 50 MG: 50 TABLET ORAL at 08:05

## 2024-02-11 RX ADMIN — LACTULOSE 20 G: 20 SOLUTION ORAL at 14:09

## 2024-02-11 RX ADMIN — SODIUM CHLORIDE 1 G: 1 TABLET ORAL at 08:05

## 2024-02-11 RX ADMIN — Medication 15 G: at 15:38

## 2024-02-11 RX ADMIN — FOLIC ACID 1 MG: 1 TABLET ORAL at 08:05

## 2024-02-11 RX ADMIN — ONDANSETRON 4 MG: 2 INJECTION INTRAMUSCULAR; INTRAVENOUS at 05:38

## 2024-02-11 RX ADMIN — RIFAXIMIN 550 MG: 550 TABLET ORAL at 08:13

## 2024-02-11 RX ADMIN — LACTULOSE 20 G: 20 SOLUTION ORAL at 08:05

## 2024-02-11 NOTE — PLAN OF CARE
Problem: Discharge Planning  Goal: Discharge to home or other facility with appropriate resources  2/11/2024 1516 by Mariza Kaur RN  Outcome: Completed     Problem: ABCDS Injury Assessment  Goal: Absence of physical injury  2/11/2024 1516 by Mariza Kaur RN  Outcome: Completed     Problem: Gastrointestinal - Adult  Goal: Maintains or returns to baseline bowel function  2/11/2024 1516 by Mariza Kaur, RN  Outcome: Completed     Problem: Gastrointestinal - Adult  Goal: Maintains adequate nutritional intake  2/11/2024 1516 by Mariza Kaur, RN  Outcome: Completed     Problem: Hematologic - Adult  Goal: Maintains hematologic stability  2/11/2024 1516 by Mariza Kaur, RN  Outcome: Completed

## 2024-02-11 NOTE — PROGRESS NOTES
HOSPITALISTS PROGRESS NOTE    2/5/2024 8:46 AM        Name: Angélica Gonzalez .              Admitted: 2/4/2024  Primary Care Provider: Dee Dee Pinon (Tel: [phone])      Brief Course: This 38-year-old female with PMHx of liver failure, alcoholic liver cirrhosis with recurrent paracentesis, chronic hyponatremia who presented with dizziness and constipation.  Patient was noted to have hyponatremia secondary to fluid overload    Interval history:   Pt seen and examined today   Overnight events noted and interval ancillary notes reviewed.  On room air satting well.  Afebrile overnight, WBCs 11.5 K  Serum sodium 118 this morning, chloride 87,  Pt reported abdominal distention denied any fever, chills, chest pain, shortness of breath, nausea vomiting, hematemesis, melena or hematochezia       Assessment & Plan:     Hyponatremia; acute on chronic; 2/2 from volume overload in setting of ascites  Per chart review, patient on sodium tablets and 1 L fluid restriction; likely noncompliant  Sodium of 118 on admission;   Serum osmole, urine osmole, urine electrolytes ordered  Nephrology on board; Lasix increased to 20 mg daily.  Started on spironolactone  Maintain 1 L fluid restriction.  Monitor sodium closely    Dizziness; likely 2/2 orthostatic hypotension: Resolved    Alcoholic liver cirrhosis;    Patient has established care with  liver transplant team.  Will need 12 weeks of substance use counseling prior to being considered for liver transplant    Ascites; diuretic resistant.  On biweekly paracentesis.    s/p multiple paracentesis.Last paracentesis 1/30 and 1.2 L removed.  IR consulted for paracentesis; continue rifaximin and lactulose    Constipation: Unremarkable KUB.  Continue home lactulose    Mild leukocytosis; likely reactive.  Patient afebrile on admission.  UA pending     Macrocytic anemia and thrombocytopenia likely 2/2 alcohol 
                                                                      HOSPITALISTS PROGRESS NOTE    2/6/2024 9:54 AM        Name: Angélica Gonzalez .              Admitted: 2/4/2024  Primary Care Provider: Dee Dee Pinon (Tel: [phone])      Brief Course: This 38-year-old female with PMHx of liver failure, alcoholic liver cirrhosis with recurrent paracentesis, chronic hyponatremia who presented with dizziness and constipation.  Patient was noted to have hyponatremia secondary to fluid overload    Interval history:   Pt seen and examined today.  Overnight events noted, interval ancillary notes and labs reviewed.   Status post paracentesis by IR on 2/5/2024; about 3.2 L of fluid removed  Afebrile overnight, WBCs remained around 11.1 K  Serum sodium improved to 120  Reported that her stomach is distended again and seems like she already needs another paracentesis     Denied any nausea, vomiting, hematemesis, melena, SOB or chest pain      Assessment & Plan:     Hyponatremia; acute on chronic; 2/2 from volume overload in setting of ascites  Per chart review, patient on sodium tablets and 1 L fluid restriction; likely noncompliant  Sodium of 118 on admission;   Serum osmole, urine osmole, urine electrolytes ordered  Nephrology on board; Lasix increased to 20 mg daily.  Started on spironolactone  Maintain 1 L fluid restriction.  Monitor sodium closely    Dizziness; likely 2/2 orthostatic hypotension: Resolved    Alcoholic liver cirrhosis;    Patient has established care with  liver transplant team.  Will need 12 weeks of substance use counseling prior to being considered for liver transplant    Ascites; diuretic resistant.  On biweekly paracentesis.    s/p multiple paracentesis.Last paracentesis 1/30 and 1.2 L removed.  IR consulted for paracentesis; continue rifaximin and lactulose    Constipation: Unremarkable KUB.  Continue home lactulose    Mild leukocytosis; likely reactive.  Patient afebrile on 
                                                                      HOSPITALISTS PROGRESS NOTE    2/7/2024 9:05 AM        Name: Angélica Gonzalez .              Admitted: 2/4/2024  Primary Care Provider: Dee Dee Pinon (Tel: [phone])      Brief Course: This 38-year-old female with PMHx of liver failure, alcoholic liver cirrhosis with recurrent paracentesis, chronic hyponatremia who presented with dizziness and constipation.  Patient was noted to have hyponatremia secondary to fluid overload      Interval history:   Pt seen and examined today.  Overnight events noted, interval ancillary notes and labs reviewed.   Hgb down to 6.3.  1 unit of pRBCs ordered.  Check occult stool.  GI consulted for concern for varices bleed  Sodium remained around 119.  Diuretics held and 1 dose of Samsca ordered  Reported abdominal distention but denies any nausea, vomiting, hematemesis, melena or hematochezia        Assessment & Plan:     Hyponatremia; acute on chronic; 2/2 from volume overload in setting of ascites  Per chart review, patient on sodium tablets and 1 L fluid restriction; likely noncompliant  Sodium of 118 on admission; remained a dose of Samsca orderedround  Nephrology on board; diuretics held.  Maintain 1.2 L fluid restriction.  Monitor sodium levels closely    Dizziness; likely 2/2 orthostatic hypotension: Resolved    Alcoholic liver cirrhosis;  Patient has established care with  liver transplant team.  Will need 12 weeks of substance use counseling prior to being considered for liver transplant  Continue lactulose and Rifampicin    Ascites; diuretic resistant.  On biweekly paracentesis.    s/p multiple paracentesis.Last paracentesis 1/30 and 1.2 L removed.  IR consulted for paracentesis; continue rifaximin and lactulose    Constipation: Unremarkable KUB.  Continue home lactulose    Mild leukocytosis; likely reactive.  Patient afebrile on admission.  UA pending     Macrocytic anemia; likely 2/2 alcohol 
                                      ONCOLOGY HEMATOLOGY CARE PROGRESS NOTE      SUBJECTIVE:  Denies nausea or vomiting.  Denies diarrhea or constipation denies hematemesis, melena or hematochezia.      ROS:   Currently denies respiratory or gastrointestinal symptoms.      OBJECTIVE        Physical    VITALS:  Patient Vitals for the past 24 hrs:   BP Temp Temp src Pulse Resp SpO2 Weight   02/08/24 1917 (!) 104/56 98.3 °F (36.8 °C) Oral 91 16 100 % --   02/08/24 1642 -- -- -- 81 -- -- --   02/08/24 1504 (!) 99/57 98.6 °F (37 °C) Oral 86 16 100 % --   02/08/24 1440 -- -- -- 98 -- -- --   02/08/24 1257 -- -- -- 93 -- -- --   02/08/24 1109 112/61 98 °F (36.7 °C) Oral 92 18 100 % --   02/08/24 1021 -- -- -- 91 -- -- --   02/08/24 0843 -- -- -- 99 -- -- 93.2 kg (205 lb 8 oz)   02/08/24 0749 (!) 102/55 97.9 °F (36.6 °C) Oral 96 18 99 % --   02/08/24 0617 112/63 97.9 °F (36.6 °C) Oral 92 16 100 % --   02/08/24 0236 -- -- -- -- 18 -- --   02/08/24 0223 113/70 97.8 °F (36.6 °C) -- 90 18 100 % --   02/08/24 0220 113/70 97.8 °F (36.6 °C) -- 90 18 100 % --   02/08/24 0206 -- -- -- -- 18 -- --   02/08/24 0015 (!) 106/56 97.6 °F (36.4 °C) -- 88 18 100 % --   02/08/24 0009 (!) 106/56 97.6 °F (36.4 °C) Temporal 88 18 100 % --   02/07/24 2336 (!) 97/41 97.8 °F (36.6 °C) Temporal 92 18 100 % --       24HR INTAKE/OUTPUT:    Intake/Output Summary (Last 24 hours) at 2/8/2024 2151  Last data filed at 2/8/2024 2034  Gross per 24 hour   Intake 1105 ml   Output 3250 ml   Net -2145 ml     Conscious alert and oriented.  Icterus is present pallor is present.  Neck is supple  Respiratory efforts are normal.  Abdomen distended with ascites  Extremities no pitting edema.      DATA:  CBC:    Recent Labs     02/08/24  0326 02/07/24  2121 02/07/24  1325 02/07/24  0347 02/07/24  0240 02/06/24  0527 02/05/24  0513 02/04/24  2030 02/01/24  0437 01/31/24  0434   WBC 12.3*  --   --   --  13.9* 11.1* 11.5* 11.6* 9.4 9.7   NEUTROABS  --   --   --   --   -- 
      Hospitalist Progress Note    Name:  Angélica Gonzalez    /Age/Sex: 1985  (38 y.o. female)  MRN & CSN:  [id number] & [id number]    PCP: Dee Dee Pinon    Date of Admission: 2024    Patient Status:  Inpatient     Chief Complaint:   Chief Complaint   Patient presents with    Constipation     From home. Stage IV liver disease, states she is supposed to have 3-4 BM's per day but has not gone since 05:00 today. C/O abdominal bloating and swelling. Denies pain, N/V.       Hospital Course:   Admitted for constipation, increased abdominal ascites, and edema.  Nephrology consulted for hyponatremia, though patient does have chronic hyponatremia given her history of alcoholic cirrhosis.  GI consulted for elevated bilirubin and to evaluate alcoholic liver cirrhosis status.  Patient has establish care with [facility] team and is planning to follow-up with them.    S/p paracentesis on  with 3.2 L fluid removal.  Plan for paracentesis on .    Oncology consulted given anemia, though patient does have liver disease and this is likely contributing to her anemia.    Subjective:  Today is:  Hospital Day: 6.  Patient seen and examined in [id number]-3381/3381-01.     In bed.  A little sleepy today.  Having some abdominal pain, but plans eat breakfast.      Medications:  Reviewed    Infusion Medications    sodium chloride      sodium chloride       Scheduled Medications    urea  15 g Oral BID    amphetamine-dextroamphetamine  20 mg Oral QAM    ferrous sulfate  325 mg Oral Daily with breakfast    folic acid  1 mg Oral Daily    lactulose  20 g Oral TID    midodrine  5 mg Oral TID WC    pantoprazole  40 mg Oral QAM AC    rifAXIMin  550 mg Oral BID    sodium chloride  1 g Oral TID    sodium chloride flush  5-40 mL IntraVENous 2 times per day     PRN Meds: oxymetazoline, Polyvinyl Alcohol-Povidone PF, sodium chloride, traMADol, sodium chloride flush, sodium chloride, potassium chloride **OR** potassium alternative 
      Hospitalist Progress Note    Name:  Angélica Gonzalez    /Age/Sex: 1985  (38 y.o. female)  MRN & CSN:  [id number] & [id number]    PCP: Dee Dee Pinon    Date of Admission: 2024    Patient Status:  Inpatient     Chief Complaint:   Chief Complaint   Patient presents with    Constipation     From home. Stage IV liver disease, states she is supposed to have 3-4 BM's per day but has not gone since 05:00 today. C/O abdominal bloating and swelling. Denies pain, N/V.       Hospital Course:   Admitted for constipation, increased abdominal ascites, and edema.  Nephrology consulted for hyponatremia, though patient does have chronic hyponatremia given her history of alcoholic cirrhosis.  GI consulted for elevated bilirubin and to evaluate alcoholic liver cirrhosis status.  Patient has establish care with [facility] team and is planning to follow-up with them.    S/p paracentesis on  with 3.2 L fluid removal.  Plan for paracentesis on .    Oncology consulted given anemia, though patient does have liver disease and this is likely contributing to her anemia.    Subjective:  Today is:  Hospital Day: 5.  Patient seen and examined in 3TN-3381/3381-01.     Sitting up in chair.  Eating breakfast.  States her abdomen is a little bit distended, but denies pain.    Mother on phone and updated.      Medications:  Reviewed    Infusion Medications    sodium chloride      sodium chloride       Scheduled Medications    urea  15 g Oral BID    amphetamine-dextroamphetamine  20 mg Oral QAM    ferrous sulfate  325 mg Oral Daily with breakfast    folic acid  1 mg Oral Daily    lactulose  20 g Oral TID    midodrine  5 mg Oral TID WC    pantoprazole  40 mg Oral QAM AC    rifAXIMin  550 mg Oral BID    sodium chloride  1 g Oral TID    sodium chloride flush  5-40 mL IntraVENous 2 times per day     PRN Meds: Polyvinyl Alcohol-Povidone PF, sodium chloride, traMADol, sodium chloride flush, sodium chloride, potassium 
      Hospitalist Progress Note    Name:  Angélica Gonzalez    /Age/Sex: 1985  (38 y.o. female)  MRN & CSN:  [id number] & [id number]    PCP: Dee Dee Pinon    Date of Admission: 2024    Patient Status:  Inpatient     Chief Complaint:   Chief Complaint   Patient presents with    Constipation     From home. Stage IV liver disease, states she is supposed to have 3-4 BM's per day but has not gone since 05:00 today. C/O abdominal bloating and swelling. Denies pain, N/V.       Hospital Course:   Admitted for constipation, increased abdominal ascites, and edema.  Nephrology consulted for hyponatremia, though patient does have chronic hyponatremia given her history of alcoholic cirrhosis.  GI consulted for elevated bilirubin and to evaluate alcoholic liver cirrhosis status.  Patient has establish care with [facility] team and is planning to follow-up with them.    S/p paracentesis on  with 3.2 L fluid removal.  Plan for paracentesis on .    Oncology consulted given anemia, though patient does have liver disease and this is likely contributing to her anemia.    Paracentesis on  removed 6 L.    Subjective:  Today is:  Hospital Day: 7.  Patient seen and examined in 3TN-3381/3381-01.     In bed.  Denies pain.  Awaiting breakfast.      Medications:  Reviewed    Infusion Medications    sodium chloride      sodium chloride       Scheduled Medications    pantoprazole  40 mg IntraVENous BID    furosemide  40 mg IntraVENous TID    urea  15 g Oral BID    amphetamine-dextroamphetamine  20 mg Oral QAM    ferrous sulfate  325 mg Oral Daily with breakfast    folic acid  1 mg Oral Daily    lactulose  20 g Oral TID    midodrine  5 mg Oral TID WC    rifAXIMin  550 mg Oral BID    sodium chloride  1 g Oral TID    sodium chloride flush  5-40 mL IntraVENous 2 times per day     PRN Meds: oxymetazoline, Polyvinyl Alcohol-Povidone PF, sodium chloride, traMADol, sodium chloride flush, sodium chloride, potassium 
    Gastroenterology Progress Note    Angélica Gonzalez is a 38 y.o. female patient.  1. Hyponatremia    2. Acute liver failure without hepatic coma    3. Hyperbilirubinemia    4. Dizziness        Admission Date: 2024  Hospital Day: Hospital Day: 7  Attending: Doc Engle DO  Date of service: 2/10/24    SUBJECTIVE:    Hb dropped to 7.1 this AM, repeat Hb was 7.8  No gross GI bleeding   RLQ pain at the site of paracentesis     ROS:  Cardiovascular ROS: no chest pain or dyspnea on exertion  Gastrointestinal ROS: see above  Respiratory ROS: no cough, shortness of breath, or wheezing    Physical    VITALS:  /60   Pulse 93   Temp 98.2 °F (36.8 °C) (Oral)   Resp 18   Ht 1.676 m (5' 6\")   Wt 90.3 kg (199 lb)   SpO2 100%   BMI 32.12 kg/m²   TEMPERATURE:  Current - Temp: 98.2 °F (36.8 °C); Max - Temp  Av.1 °F (36.7 °C)  Min: 97.6 °F (36.4 °C)  Max: 98.6 °F (37 °C)    NAD  RRR, Nl s1s2  Lungs CTA Bilaterally, normal effort  Abdomen soft, tender RLQ, + ascites, Bowel sounds normal  AAOx3, No asterixis     Data      CBC:   Recent Labs     24  0326 24  0615 02/10/24  0455   WBC 12.3* 12.2* 9.4   RBC 2.21* 2.22* 2.00*   HGB 7.5* 7.8* 7.1*   HCT 21.5* 21.9* 19.9*   PLT 84* 89* 84*   MCV 97.2 98.7 99.2   MCH 33.7 35.0* 35.2*   MCHC 34.7 35.5 35.5   RDW 25.3* 26.0* 25.7*        BMP:  Recent Labs     24  0326 24  0843 24  0615 24  1105 24  1516 24  2113 02/10/24  0455   *   < > 122*   < > 126* 126* 125*   K 4.7  --  5.1  --   --   --  4.8   CL 87*  --  93*  --   --   --  92*   CO2 20*  --  18*  --   --   --  23   BUN 11  --  20  --   --   --  23*   CREATININE <0.5*  --  <0.5*  --   --   --  <0.5*   CALCIUM 8.3  --  8.3  --   --   --  8.3   GLUCOSE 153*  --  117*  --   --   --  125*    < > = values in this interval not displayed.        Hepatic Function Panel:   Recent Labs     24  0326 24  0615 02/10/24  0455   AST 61* 66* 57*   ALT 28 
    MD Tish Altamirano MD Samir Brahmbhatt, MD               Office: [phone]                      Fax: [phone]             [place]                   NEPHROLOGY INPATIENT PROGRESS NOTE:     PATIENT NAME: Angélica Gonzalez  : 1985   MRN: [id number]       IMPRESSION:       Admitted on  2024  for:  Hyperbilirubinemia [E80.6]  Dizziness [R42]  Hyponatremia [E87.1]  Hyponatremia syndrome [E87.1]  Acute liver failure without hepatic coma [K72.00]         Hyponatremia :   : Acute on Chronic,   : Moderate - severe range,  : mild symptomatic,   : hyper-volemic:     - No Reported Severe symptoms (such as seizures, obtundation, coma, or respiratory arrest).   - no need for Hypertonic saline or acute reversal     - Risk factors for hyponatremia:   - Previously workup for suggested SIADH most likely  - Liver cirrhosis, with alcohol abuse history  -Low solute intake, hypoalbuminemia  - Last alcohol drink reported in 2023    - Patient is at risk of developing Osmotic Demyelination Syndrome.    - Call us urgently if any/worsening neurological findings.       As of last note from us on -patient was supposed to be on-  - Continue fluid restriction 1000 mL. - likely not compliant   - Salt tablet 1 g 3 times a day.  - Lasix 20 mg once daily. - was taking 2x/day   KCl p.o. 20 mEq once daily.   \"  Patient's mother reported likely worse following 1.8 L a day  But they reported taking all other above medication as prescribed    Was not on spironolactone,    Was planned to follow-up with us on 2024.  After last discharge on 2024        RECOMMENDATIONS:     Work up:  - Serum Osm  , UOsm  , Rodrigo  , Urine K, Uric acid,     - Urine UA   (High levels may help to decide IVF needs)  - renal function-stable, without PAULO or CKD currently  - other lytes stable  - BP not very low, unlikely AI  - TSH recently controlled    -With history of liver 
    MD Tish Altamirano MD Samir Brahmbhatt, MD               Office: [phone]                      Fax: [phone]             [employer]                   NEPHROLOGY INPATIENT PROGRESS NOTE:     PATIENT NAME: Angélica Gonzalez  : 1985   MRN: [id number]       RECOMMENDATIONS:     Management:-Refer to the orders  - Monitor Serum Na   ,  - Goal Serum Na high 120s, by next /24 hours   -?SIADH w/ nausea     -Increased Lasix from 20 twice daily to 3 times a day    -Tryng IV Lasix, with possibility of bowel edema, causing poor GI absorption    -hold now w/ n/v    -samsca 15g trial x1 on  and , try again on    -monitoring LFTs , no major change    - Stopped potassium repletion from home  - spironolactone 25 mg a day  -stopped due to hyperkalemia  - Added Fluid restriction 1 to 1.2 L a day    -Continue salt tablets 1 g 3 times daily, BP has been low, will tolerate, will monitor,   -keep w/ lower BP, if able to take w/ n/v    -needing midodrine     -added Urea 15 g BID   - hold w/ n/v    -Consulted GI, fup     - Minimize use any hypotonic/isotonic fluids such as D5W, NS, LR with other medications/drips ,   - Concentrate continuous drip fluids as much as possible,   - Avoid IVF , unless needed for resuscitation/hypovolemia w/ hypotension+tachycardia,    - minimize SSRI, NSAIDs use  - Strict I/O with daily weights.  -Monitor neurological status closely  --- Call us urgently if any/worsening neurological findings.       Discharge plans from renal standpoint- expect Monday    And then on Torsemide 20 mg BID, whenever Na ~130s, stable w/o neurological s/s         IMPRESSION:       Admitted on  2024  for:  Hyperbilirubinemia [E80.6]  Dizziness [R42]  Hyponatremia [E87.1]  Hyponatremia syndrome [E87.1]  Acute liver failure without hepatic coma [K72.00]      Hyponatremia :   : Acute on Chronic,   : Moderate - severe range,  : mild symptomatic,   : 
3200ml of fluid removed during paracentsis    
6000ml of fluid removed  Spoke to patients RN and advised her the amount of fluid removed and that patient was returning to the floor.  
Critical sodium of 119 called by lab, provider notified. Seems to be improving compared to last lab draw.    0432-No new orders, nephro is following.   
DIT RN called and informed this RN that she will not be able to make it this shift and day shift will have to get PIV.  
Dayshift PICC team called and message left for PIV access.  
Dr. Garcia notified of critical hematocrit 19.9, orders received for IVP protonix and repeat h/h.   
Hgb= 5.9 HCT= 17, perfectserve Yenifer Lema, ordered to rpt H&H. Repeated result as follows: hgb= 6.3. hct 17, relayed to Yenifer Lema, waiting for response.  
Lab called with critical sodium of 116, down from 118. Per orders over night coverage notified as this is less than 118.   
MD notified via perfect serve of critical hemoglobin and critical sodium levels  
Nephrologist notified about critical sodium level. No change in patient condition.  
Nephrology Consult received for hyponatremia   Acute on chronic  Sodium 118  Per H&P note pt appears hypervolemic with h/o liver cirrhosis.  Also has h/o alcohol abuse    Will hold off on nss for now until able to assess volume status  Keep lasix  For now keep increased salt tablets  Sodium goal 124-126  Sodium checks [id number]    Hyponatremia Workup ordered    
Nephrology was informed regarding the Sodium level.  
Overnight hospitalist notified of critical hematocrit 19.7 and hgb 7.1. orders received for recheck in 4 hours.   
Pt abdomen taught and distended. Pt stating she is periodically SOB due to fluid in abdomen and feels like she needs another paracentesis done. This RN notified attending MD to make her aware.   
Pt admitted with infiltrated PIV. DIT called and requested DIT to placed ultrasound guided PIV.   
absence of gross GI bleeding and with hyponatremia.      Hyponatremia - nephrology managing.       Jone Burns MD, MSc  [facility]  02/09/24    
hyponatremia.      Hyponatremia - nephrology managing.       Jone Burns MD, MSc  [facility]  02/08/24    
components found for: \"FIB\"       BELOW MENTIONED RADIOLOGY STUDY RESULTS BY ME (AS NEEDED FOR MY EVALUATION AND MANAGEMENT).     XR ABDOMEN (KUB) (SINGLE AP VIEW)    Result Date: 2/4/2024  EXAMINATION: ONE SUPINE XRAY VIEW(S) OF THE ABDOMEN 2/4/2024 10:06 pm COMPARISON: None. HISTORY: ORDERING SYSTEM PROVIDED HISTORY: constipation TECHNOLOGIST PROVIDED HISTORY: Reason for exam:->constipation Reason for Exam: constipation FINDINGS: No small bowel dilation.  Only a small amount of stool is seen in the colon. No colonic dilation.  No large mass.  IUD is present in the pelvis.     Unremarkable abdomen     IR US GUIDED PARACENTESIS    Result Date: 1/30/2024  PROCEDURE: PARACENTESIS WITHOUT IMAGE GUIDANCE US ABDOMEN LIMITED 1/30/2024 HISTORY: ORDERING SYSTEM PROVIDED HISTORY: cirrhosis, ascites despite diuretics. dx and tx, cell count and cx. TECHNOLOGIST PROVIDED HISTORY: Reason for exam:->cirrhosis, ascites despite diuretics. dx and tx, cell count and cx. TECHNIQUE: Informed consent was obtained after a detailed explanation of the procedure including risks, benefits, and alternatives.  Universal protocol was followed.  A limited ultrasound of the abdomen was performed. The right abdomen was prepped and draped in sterile fashion and local anesthesia was achieved with lidocaine.  A 5 French needle sheath was advanced into ascites and paracentesis was performed.  The patient tolerated the procedure well. Estimated blood loss: Less than 5 cc FINDINGS: Limited ultrasound of the abdomen demonstrates ascites. A total of 1.2 L was removed.     Successful paracentesis.     IR US GUIDED PARACENTESIS    Result Date: 1/27/2024  PROCEDURE: PARACENTESIS WITHOUT IMAGE GUIDANCE US ABDOMEN LIMITED 1/27/2024 HISTORY: ORDERING SYSTEM PROVIDED HISTORY: hepatic encephalopathy and ammonia > 200 and history ascities/frequent paracentesis. TECHNOLOGIST PROVIDED HISTORY: Reason for exam:->hepatic encephalopathy and ammonia > 200 and history 
blood loss: Less than 5 cc FINDINGS: Limited ultrasound of the abdomen demonstrates ascites. A total of 1.2 L was removed.     Successful paracentesis.     IR US GUIDED PARACENTESIS    Result Date: 1/27/2024  PROCEDURE: PARACENTESIS WITHOUT IMAGE GUIDANCE US ABDOMEN LIMITED 1/27/2024 HISTORY: ORDERING SYSTEM PROVIDED HISTORY: hepatic encephalopathy and ammonia > 200 and history ascities/frequent paracentesis. TECHNOLOGIST PROVIDED HISTORY: Reason for exam:->hepatic encephalopathy and ammonia > 200 and history ascities/frequent paracentesis. TECHNIQUE: Informed consent was obtained after a detailed explanation of the procedure including risks, benefits, and alternatives.  Universal protocol was followed.  A limited ultrasound of the abdomen was performed. The right abdomen was prepped and draped in sterile fashion and local anesthesia was achieved with lidocaine.  A 5 Czech needle sheath was advanced into ascites and paracentesis was performed.  The patient tolerated the procedure well. Estimated blood loss: Less than 5 cc FINDINGS: Limited ultrasound of the abdomen demonstrates ascites. A total of 1.8 L was removed.     Successful paracentesis.     XR CHEST PORTABLE    Result Date: 1/27/2024  EXAMINATION: ONE XRAY VIEW OF THE CHEST 1/27/2024 2:40 pm COMPARISON: 01/20/2024 HISTORY: ORDERING SYSTEM PROVIDED HISTORY: Investigate for possible PNA TECHNOLOGIST PROVIDED HISTORY: Reason for exam:->Investigate for possible PNA Reason for Exam: Investigate for possible PNA FINDINGS: There is mild improvement with residual mild pulmonary vascular congestion. There is worsening left basilar airspace disease.  The cardiac silhouette is stable.  There is no pneumothorax.     1. Worsening left basilar airspace disease either due to atelectasis or developing pneumonia.     IR US GUIDED PARACENTESIS    Result Date: 1/22/2024  PROCEDURE: PARACENTESIS WITHOUT IMAGE GUIDANCE US ABDOMEN LIMITED 1/22/2024 HISTORY: ORDERING SYSTEM 
total of 2.1 L of clear yellow ascites was removed.     Successful ultrasound guided paracentesis.     XR CHEST PORTABLE    Result Date: 1/17/2024  EXAMINATION: ONE X-RAY VIEW OF THE CHEST 1/17/2024 3:49 pm COMPARISON: None HISTORY: ORDERING SYSTEM PROVIDED HISTORY:  Tachycardia TECHNOLOGIST PROVIDED HISTORY: Reason for Exam:  Tachycardia Reason for Exam: Tachycardia. Initial evaluation Shortness of breath, tachycardia. FINDINGS: The cardiomediastinal silhouette is normal in size. Lung volumes are low. No evidence of acute infiltrate or pulmonary edema. No subdiaphragmatic free air is present.     Low lung volumes without evidence of acute cardiopulmonary process.     IR US GUIDED PARACENTESIS    Result Date: 1/12/2024  PROCEDURE: PARACENTESIS WITH IMAGE GUIDANCE US ABDOMEN LIMITED 1/12/2024 HISTORY: ORDERING SYSTEM PROVIDED HISTORY: ascites TECHNOLOGIST PROVIDED HISTORY: Reason for exam:->ascites TECHNIQUE: Informed consent was obtained after a detailed explanation of the procedure including risks, benefits, and alternatives.  Universal protocol was followed.  A limited ultrasound of the abdomen was performed. The right abdomen was prepped and draped in sterile fashion and local anesthesia was achieved with lidocaine. Under ultrasound guidance, a 5 Azeri needle sheath was advanced into ascites and paracentesis was performed.  The patient tolerated the procedure well. FINDINGS: Limited ultrasound of the abdomen demonstrates ascites. A total of 5300 mL of ascites fluid was removed.     Successful paracentesis.     IR US GUIDED PARACENTESIS    Result Date: 1/9/2024  PROCEDURE: PARACENTESIS WITH IMAGE GUIDANCE US ABDOMEN LIMITED 1/9/2024 HISTORY: ORDERING SYSTEM PROVIDED HISTORY: ascites TECHNOLOGIST PROVIDED HISTORY: Reason for exam:->ascites TECHNIQUE: Informed consent was obtained after a detailed explanation of the procedure including risks, benefits, and alternatives.  Universal protocol was followed.  A limited 
ultrasound of the abdomen was performed. The right abdomen was prepped and draped in sterile fashion and local anesthesia was achieved with lidocaine.  A 5 Turkish needle sheath was advanced into ascites and paracentesis was performed.  The patient tolerated the procedure well. FINDINGS: Limited ultrasound of the abdomen demonstrates ascites. A total of 6600 ml dark yellow/serous ascites was removed.     Successful paracentesis.             ======================================================================  Please note that this chart entry has been generated using voice recognition software, mainly.  So please excuse brevity and/or typos.  While every effort and attempts have been made to ensure the accuracy of this automated transcription, some errors may have occurred; and certain words and phrases in transcription may not be entered as intended.  However, inadvertent computerized transcription errors may be present.  So please contact us if any clarification needed.

## 2024-02-11 NOTE — DISCHARGE SUMMARY
Hospital Medicine Discharge Summary    Name:  Angélica Gonzalez  Gender: female  : 1985  38 y.o.  MRN: [id number]    PCP: Dee Dee Pinon     Date of Admission:  2024  7:48 PM  Discharge Date: 2024    Admitting Physician: Jose Luis Vicente DO  Discharge Physician: Doc Engle DO    Communication to PCP  -Salt tabs and urea packets for hyponatremia  -On torsemide      Discharge Diagnoses:       Active Hospital Problems    Diagnosis     Thrombocytopenia (HCC) [D69.6]     Hyponatremia syndrome [E87.1]     Esophageal varices (HCC) [I85.00]     Decompensated hepatic cirrhosis (HCC) [K72.90, K74.60]     Ascites due to alcoholic cirrhosis (HCC) [K70.31]     Chronic anemia [D64.9]        The patient was seen and examined on day of discharge and this discharge summary is in conjunction with any daily progress note from day of discharge.    Hospital Course:  Angélica Gonzalez is a 38 y.o. year old female who presented to [facility] on 2024  7:48 PM.      Admitted for constipation, increased abdominal ascites, and edema.  Nephrology consulted for hyponatremia, though patient does have chronic hyponatremia given her history of alcoholic cirrhosis.  GI consulted for elevated bilirubin and to evaluate alcoholic liver cirrhosis status.  Patient has establish care with [facility] team and is planning to follow-up with them.     S/p paracentesis on  with 3.2 L fluid removal.  Plan for paracentesis on .     Oncology consulted given anemia, though patient does have liver disease and this is likely contributing to her anemia.     Paracentesis on  removed 6 L.      On the last day of hospital stay, patient was doing well. Eating okay, though did have some nausea. No pain.  The patient expressed appropriate understanding of and agreement with the discharge recommendations, medications, and plan.      Physical Exam Performed:     BP (!) 110/37   Pulse 94   Temp 98 °F (36.7 °C)

## 2024-02-12 LAB
BACTERIA FLD AEROBE CULT: NORMAL
GRAM STN SPEC: NORMAL

## 2024-02-15 ENCOUNTER — HOSPITAL ENCOUNTER (OUTPATIENT)
Dept: INTERVENTIONAL RADIOLOGY/VASCULAR | Age: 39
Discharge: HOME | End: 2024-02-15
Payer: COMMERCIAL

## 2024-02-15 VITALS
TEMPERATURE: 97.8 F | DIASTOLIC BLOOD PRESSURE: 42 MMHG | RESPIRATION RATE: 16 BRPM | SYSTOLIC BLOOD PRESSURE: 106 MMHG | HEART RATE: 92 BPM | OXYGEN SATURATION: 100 %

## 2024-02-15 DIAGNOSIS — R18.8 OTHER ASCITES: ICD-10-CM

## 2024-02-15 LAB
APPEARANCE FLUID: NORMAL
BDY FLUID QUALITY: NORMAL
CELL COUNT FLUID TYPE: NORMAL
COLOR FLUID: NORMAL
EOSINOPHIL NFR FLD MANUAL: 2 %
LYMPHOCYTES NFR FLD: 33 %
NEUTROPHIL, FLUID: 61 %
NUC CELL # FLD: 462 /CUMM
PATH REV: NO
RBC FLUID: NORMAL /CUMM
TOTAL CELLS COUNTED FLD: 100

## 2024-02-15 PROCEDURE — 7100000010 HC PHASE II RECOVERY - FIRST 15 MIN

## 2024-02-15 PROCEDURE — 49083 ABD PARACENTESIS W/IMAGING: CPT

## 2024-02-15 PROCEDURE — 87070 CULTURE OTHR SPECIMN AEROBIC: CPT

## 2024-02-15 PROCEDURE — 87015 SPECIMEN INFECT AGNT CONCNTJ: CPT

## 2024-02-15 PROCEDURE — 89051 BODY FLUID CELL COUNT: CPT

## 2024-02-15 PROCEDURE — C1729 CATH, DRAINAGE: HCPCS

## 2024-02-15 PROCEDURE — 87205 SMEAR GRAM STAIN: CPT

## 2024-02-15 PROCEDURE — 2500000003 HC RX 250 WO HCPCS: Performed by: RADIOLOGY

## 2024-02-15 RX ORDER — LIDOCAINE HYDROCHLORIDE 10 MG/ML
10 INJECTION, SOLUTION EPIDURAL; INFILTRATION; INTRACAUDAL; PERINEURAL ONCE
Status: COMPLETED | OUTPATIENT
Start: 2024-02-15 | End: 2024-02-15

## 2024-02-15 RX ADMIN — LIDOCAINE HYDROCHLORIDE 10 ML: 10 INJECTION, SOLUTION EPIDURAL; INFILTRATION; INTRACAUDAL; PERINEURAL at 14:02

## 2024-02-15 NOTE — PROGRESS NOTES
4600ml of fluid removed  Spoke to patients REJI schaefer and advised her the amount of fluid removed and that patient was returning to the floor.

## 2024-02-18 LAB
BACTERIA FLD AEROBE CULT: NORMAL
GRAM STN SPEC: NORMAL

## 2024-02-29 ENCOUNTER — HOSPITAL ENCOUNTER (OUTPATIENT)
Dept: INTERVENTIONAL RADIOLOGY/VASCULAR | Age: 39
Discharge: HOME | End: 2024-02-29

## 2024-03-06 ENCOUNTER — TRANSCRIBE ORDERS (OUTPATIENT)
Dept: ADMINISTRATIVE | Age: 39
End: 2024-03-06

## 2024-03-06 DIAGNOSIS — R18.8 OTHER ASCITES: Primary | ICD-10-CM

## (undated) DEVICE — MOUTHPIECE ENDOSCP L CTRL OPN AND SIDE PORTS DISP

## (undated) DEVICE — VALVE SUCTION AIR H2O SET ORCA POD + DISP

## (undated) DEVICE — BW-412T DISP COMBO CLEANING BRUSH: Brand: SINGLE USE COMBINATION CLEANING BRUSH

## (undated) DEVICE — ENDOSCOPIC KIT 6X3/16 FT COLON W/ 1.1 OZ 2 GWN W/O BRSH

## (undated) DEVICE — SOLUTION IV IRRIG WATER 500ML POUR BRL ST 2F7113

## (undated) DEVICE — GOWN AURORA NONREINF LG: Brand: MEDLINE INDUSTRIES, INC.

## (undated) DEVICE — AIR/WATER CLEANING ADAPTER FOR OLYMPUS® GI ENDOSCOPE: Brand: BULLDOG®

## (undated) DEVICE — FORCEPS BX L240CM WRK CHN 2.8MM STD CAP W/ NDL MIC MESH